# Patient Record
Sex: MALE | Race: WHITE | NOT HISPANIC OR LATINO | URBAN - METROPOLITAN AREA
[De-identification: names, ages, dates, MRNs, and addresses within clinical notes are randomized per-mention and may not be internally consistent; named-entity substitution may affect disease eponyms.]

---

## 2017-09-23 ENCOUNTER — INPATIENT (INPATIENT)
Facility: HOSPITAL | Age: 62
LOS: 7 days | Discharge: HOME | End: 2017-10-01
Attending: INTERNAL MEDICINE | Admitting: INTERNAL MEDICINE
Payer: COMMERCIAL

## 2017-09-23 VITALS
DIASTOLIC BLOOD PRESSURE: 88 MMHG | OXYGEN SATURATION: 100 % | TEMPERATURE: 98 F | SYSTOLIC BLOOD PRESSURE: 156 MMHG | HEART RATE: 72 BPM | RESPIRATION RATE: 16 BRPM

## 2017-09-23 DIAGNOSIS — R07.9 CHEST PAIN, UNSPECIFIED: ICD-10-CM

## 2017-09-23 LAB
ALBUMIN SERPL ELPH-MCNC: 4.1 G/DL — SIGNIFICANT CHANGE UP (ref 3.3–5)
ALP SERPL-CCNC: 82 U/L — SIGNIFICANT CHANGE UP (ref 40–120)
ALT FLD-CCNC: 22 U/L — SIGNIFICANT CHANGE UP (ref 4–41)
APTT BLD: 33 SEC — SIGNIFICANT CHANGE UP (ref 27.5–37.4)
AST SERPL-CCNC: 20 U/L — SIGNIFICANT CHANGE UP (ref 4–40)
BASE EXCESS BLDV CALC-SCNC: 2 MMOL/L — SIGNIFICANT CHANGE UP
BASOPHILS # BLD AUTO: 0.07 K/UL — SIGNIFICANT CHANGE UP (ref 0–0.2)
BASOPHILS NFR BLD AUTO: 0.8 % — SIGNIFICANT CHANGE UP (ref 0–2)
BILIRUB SERPL-MCNC: 0.4 MG/DL — SIGNIFICANT CHANGE UP (ref 0.2–1.2)
BLOOD GAS VENOUS - CREATININE: 0.9 MG/DL — SIGNIFICANT CHANGE UP (ref 0.5–1.3)
BUN SERPL-MCNC: 16 MG/DL — SIGNIFICANT CHANGE UP (ref 7–23)
CALCIUM SERPL-MCNC: 9.1 MG/DL — SIGNIFICANT CHANGE UP (ref 8.4–10.5)
CHLORIDE BLDV-SCNC: 109 MMOL/L — HIGH (ref 96–108)
CHLORIDE SERPL-SCNC: 107 MMOL/L — SIGNIFICANT CHANGE UP (ref 98–107)
CK MB BLD-MCNC: 1.82 NG/ML — SIGNIFICANT CHANGE UP (ref 1–6.6)
CK MB BLD-MCNC: SIGNIFICANT CHANGE UP (ref 0–2.5)
CK SERPL-CCNC: 39 U/L — SIGNIFICANT CHANGE UP (ref 30–200)
CK SERPL-CCNC: 43 U/L — SIGNIFICANT CHANGE UP (ref 30–200)
CO2 SERPL-SCNC: 25 MMOL/L — SIGNIFICANT CHANGE UP (ref 22–31)
CREAT SERPL-MCNC: 1 MG/DL — SIGNIFICANT CHANGE UP (ref 0.5–1.3)
EOSINOPHIL # BLD AUTO: 0.26 K/UL — SIGNIFICANT CHANGE UP (ref 0–0.5)
EOSINOPHIL NFR BLD AUTO: 2.9 % — SIGNIFICANT CHANGE UP (ref 0–6)
GAS PNL BLDV: 143 MMOL/L — SIGNIFICANT CHANGE UP (ref 136–146)
GLUCOSE BLDV-MCNC: 117 — HIGH (ref 70–99)
GLUCOSE SERPL-MCNC: 119 MG/DL — HIGH (ref 70–99)
HCO3 BLDV-SCNC: 25 MMOL/L — SIGNIFICANT CHANGE UP (ref 20–27)
HCT VFR BLD CALC: 37.7 % — LOW (ref 39–50)
HCT VFR BLDV CALC: 40.5 % — SIGNIFICANT CHANGE UP (ref 39–51)
HGB BLD-MCNC: 12.9 G/DL — LOW (ref 13–17)
HGB BLDV-MCNC: 13.2 G/DL — SIGNIFICANT CHANGE UP (ref 13–17)
IMM GRANULOCYTES # BLD AUTO: 0.02 # — SIGNIFICANT CHANGE UP
IMM GRANULOCYTES NFR BLD AUTO: 0.2 % — SIGNIFICANT CHANGE UP (ref 0–1.5)
INR BLD: 1.07 — SIGNIFICANT CHANGE UP (ref 0.88–1.17)
LACTATE BLDV-MCNC: 1 MMOL/L — SIGNIFICANT CHANGE UP (ref 0.5–2)
LYMPHOCYTES # BLD AUTO: 1.22 K/UL — SIGNIFICANT CHANGE UP (ref 1–3.3)
LYMPHOCYTES # BLD AUTO: 13.4 % — SIGNIFICANT CHANGE UP (ref 13–44)
MCHC RBC-ENTMCNC: 30.7 PG — SIGNIFICANT CHANGE UP (ref 27–34)
MCHC RBC-ENTMCNC: 34.2 % — SIGNIFICANT CHANGE UP (ref 32–36)
MCV RBC AUTO: 89.8 FL — SIGNIFICANT CHANGE UP (ref 80–100)
MONOCYTES # BLD AUTO: 0.41 K/UL — SIGNIFICANT CHANGE UP (ref 0–0.9)
MONOCYTES NFR BLD AUTO: 4.5 % — SIGNIFICANT CHANGE UP (ref 2–14)
NEUTROPHILS # BLD AUTO: 7.14 K/UL — SIGNIFICANT CHANGE UP (ref 1.8–7.4)
NEUTROPHILS NFR BLD AUTO: 78.2 % — HIGH (ref 43–77)
NRBC # FLD: 0 — SIGNIFICANT CHANGE UP
PCO2 BLDV: 53 MMHG — HIGH (ref 41–51)
PH BLDV: 7.33 PH — SIGNIFICANT CHANGE UP (ref 7.32–7.43)
PLATELET # BLD AUTO: 203 K/UL — SIGNIFICANT CHANGE UP (ref 150–400)
PMV BLD: 11.1 FL — SIGNIFICANT CHANGE UP (ref 7–13)
PO2 BLDV: 43 MMHG — HIGH (ref 35–40)
POTASSIUM BLDV-SCNC: 3.8 MMOL/L — SIGNIFICANT CHANGE UP (ref 3.4–4.5)
POTASSIUM SERPL-MCNC: 4 MMOL/L — SIGNIFICANT CHANGE UP (ref 3.5–5.3)
POTASSIUM SERPL-SCNC: 4 MMOL/L — SIGNIFICANT CHANGE UP (ref 3.5–5.3)
PROT SERPL-MCNC: 7.2 G/DL — SIGNIFICANT CHANGE UP (ref 6–8.3)
PROTHROM AB SERPL-ACNC: 12 SEC — SIGNIFICANT CHANGE UP (ref 9.8–13.1)
RBC # BLD: 4.2 M/UL — SIGNIFICANT CHANGE UP (ref 4.2–5.8)
RBC # FLD: 12.5 % — SIGNIFICANT CHANGE UP (ref 10.3–14.5)
SAO2 % BLDV: 75.7 % — SIGNIFICANT CHANGE UP (ref 60–85)
SODIUM SERPL-SCNC: 146 MMOL/L — HIGH (ref 135–145)
TROPONIN T SERPL-MCNC: < 0.06 NG/ML — SIGNIFICANT CHANGE UP (ref 0–0.06)
TROPONIN T SERPL-MCNC: < 0.06 NG/ML — SIGNIFICANT CHANGE UP (ref 0–0.06)
WBC # BLD: 9.12 K/UL — SIGNIFICANT CHANGE UP (ref 3.8–10.5)
WBC # FLD AUTO: 9.12 K/UL — SIGNIFICANT CHANGE UP (ref 3.8–10.5)

## 2017-09-23 PROCEDURE — 71020: CPT | Mod: 26

## 2017-09-23 PROCEDURE — 93010 ELECTROCARDIOGRAM REPORT: CPT

## 2017-09-23 RX ORDER — ASPIRIN/CALCIUM CARB/MAGNESIUM 324 MG
243 TABLET ORAL DAILY
Qty: 0 | Refills: 0 | Status: DISCONTINUED | OUTPATIENT
Start: 2017-09-23 | End: 2017-09-23

## 2017-09-23 RX ORDER — LOSARTAN POTASSIUM 100 MG/1
100 TABLET, FILM COATED ORAL DAILY
Qty: 0 | Refills: 0 | Status: DISCONTINUED | OUTPATIENT
Start: 2017-09-24 | End: 2017-10-01

## 2017-09-23 RX ORDER — FUROSEMIDE 40 MG
40 TABLET ORAL DAILY
Qty: 0 | Refills: 0 | Status: DISCONTINUED | OUTPATIENT
Start: 2017-09-23 | End: 2017-10-01

## 2017-09-23 RX ORDER — AMIODARONE HYDROCHLORIDE 400 MG/1
200 TABLET ORAL DAILY
Qty: 0 | Refills: 0 | Status: DISCONTINUED | OUTPATIENT
Start: 2017-09-24 | End: 2017-09-28

## 2017-09-23 RX ORDER — GLUCAGON INJECTION, SOLUTION 0.5 MG/.1ML
1 INJECTION, SOLUTION SUBCUTANEOUS ONCE
Qty: 0 | Refills: 0 | Status: DISCONTINUED | OUTPATIENT
Start: 2017-09-23 | End: 2017-10-01

## 2017-09-23 RX ORDER — RANOLAZINE 500 MG/1
500 TABLET, FILM COATED, EXTENDED RELEASE ORAL
Qty: 0 | Refills: 0 | Status: DISCONTINUED | OUTPATIENT
Start: 2017-09-23 | End: 2017-10-01

## 2017-09-23 RX ORDER — SODIUM CHLORIDE 9 MG/ML
1000 INJECTION, SOLUTION INTRAVENOUS
Qty: 0 | Refills: 0 | Status: DISCONTINUED | OUTPATIENT
Start: 2017-09-23 | End: 2017-10-01

## 2017-09-23 RX ORDER — HEPARIN SODIUM 5000 [USP'U]/ML
5000 INJECTION INTRAVENOUS; SUBCUTANEOUS EVERY 12 HOURS
Qty: 0 | Refills: 0 | Status: DISCONTINUED | OUTPATIENT
Start: 2017-09-23 | End: 2017-09-28

## 2017-09-23 RX ORDER — DEXTROSE 50 % IN WATER 50 %
1 SYRINGE (ML) INTRAVENOUS ONCE
Qty: 0 | Refills: 0 | Status: DISCONTINUED | OUTPATIENT
Start: 2017-09-23 | End: 2017-10-01

## 2017-09-23 RX ORDER — INFLUENZA VIRUS VACCINE 15; 15; 15; 15 UG/.5ML; UG/.5ML; UG/.5ML; UG/.5ML
0.5 SUSPENSION INTRAMUSCULAR ONCE
Qty: 0 | Refills: 0 | Status: COMPLETED | OUTPATIENT
Start: 2017-09-23 | End: 2017-10-01

## 2017-09-23 RX ORDER — ATORVASTATIN CALCIUM 80 MG/1
40 TABLET, FILM COATED ORAL AT BEDTIME
Qty: 0 | Refills: 0 | Status: DISCONTINUED | OUTPATIENT
Start: 2017-09-23 | End: 2017-10-01

## 2017-09-23 RX ORDER — INSULIN LISPRO 100/ML
VIAL (ML) SUBCUTANEOUS
Qty: 0 | Refills: 0 | Status: DISCONTINUED | OUTPATIENT
Start: 2017-09-23 | End: 2017-10-01

## 2017-09-23 RX ORDER — DEXTROSE 50 % IN WATER 50 %
25 SYRINGE (ML) INTRAVENOUS ONCE
Qty: 0 | Refills: 0 | Status: DISCONTINUED | OUTPATIENT
Start: 2017-09-23 | End: 2017-10-01

## 2017-09-23 RX ORDER — ACETAMINOPHEN 500 MG
650 TABLET ORAL ONCE
Qty: 0 | Refills: 0 | Status: COMPLETED | OUTPATIENT
Start: 2017-09-23 | End: 2017-09-23

## 2017-09-23 RX ORDER — ASPIRIN/CALCIUM CARB/MAGNESIUM 324 MG
81 TABLET ORAL DAILY
Qty: 0 | Refills: 0 | Status: DISCONTINUED | OUTPATIENT
Start: 2017-09-24 | End: 2017-10-01

## 2017-09-23 RX ORDER — ISOSORBIDE DINITRATE 5 MG/1
30 TABLET ORAL
Qty: 0 | Refills: 0 | Status: DISCONTINUED | OUTPATIENT
Start: 2017-09-23 | End: 2017-10-01

## 2017-09-23 RX ORDER — SODIUM CHLORIDE 9 MG/ML
3 INJECTION INTRAMUSCULAR; INTRAVENOUS; SUBCUTANEOUS EVERY 8 HOURS
Qty: 0 | Refills: 0 | Status: DISCONTINUED | OUTPATIENT
Start: 2017-09-23 | End: 2017-10-01

## 2017-09-23 RX ORDER — DEXTROSE 50 % IN WATER 50 %
12.5 SYRINGE (ML) INTRAVENOUS ONCE
Qty: 0 | Refills: 0 | Status: DISCONTINUED | OUTPATIENT
Start: 2017-09-23 | End: 2017-10-01

## 2017-09-23 RX ORDER — INSULIN LISPRO 100/ML
VIAL (ML) SUBCUTANEOUS AT BEDTIME
Qty: 0 | Refills: 0 | Status: DISCONTINUED | OUTPATIENT
Start: 2017-09-23 | End: 2017-10-01

## 2017-09-23 RX ADMIN — Medication 243 MILLIGRAM(S): at 18:12

## 2017-09-23 NOTE — ED PROVIDER NOTE - ATTENDING CONTRIBUTION TO CARE
Attending Statement: I have personally seen and examined this patient. I have fully participated in the care of this patient. I have reviewed all pertinent clinical information, including history physical exam, plan and the Resident's note and agree except as noted  see HPI/pe

## 2017-09-23 NOTE — ED ADULT TRIAGE NOTE - CHIEF COMPLAINT QUOTE
reports weakness,pain to both wrists  since yesterday. ch pains,diff breathing since today intermittent. headache continuous since today.  denies fever,cough..

## 2017-09-23 NOTE — ED PROVIDER NOTE - OBJECTIVE STATEMENT
Attending  61yo M  with daughter at bedside translating for pt. Pt consented. PT co chest pain since 5am. Described as a "tightness" midsternal non radiating wax/wane intensity. Associated w sob. Not pleuritic or positional. No associated nausea/palpitations.  Feels like prior MI. hx of CAD, HTN. Pt states a day ago he felt weak, had 'pain of both wrists" today had generalized weakness and chest pain. A week ago pt had chest pain saw his cardiologist had an echo, unknown results and scheduled for a stress test next week. 63 yo male presenting with 1 day hx of weakness and bilateral wrist pain and today patient started having exertional chest pressure associated with shortness of breath.  took his home medications for his symptoms.  not better or worse with anything.  5/10 in severity with radiation into the epigastric region.  denies fevers chills.  had echo last week but patient does not know results.  has stress scheduled next week.    mani Vyas  pcp- lee       Attending  61yo M  with daughter at bedside translating for pt. Pt consented. PT co chest pain since 5am. Described as a "tightness" midsternal non radiating wax/wane intensity. Associated w sob. Not pleuritic or positional. No associated nausea/palpitations.  Feels like prior MI. hx of CAD, HTN. Pt states a day ago he felt weak, had 'pain of both wrists" today had generalized weakness and chest pain. A week ago pt had chest pain saw his cardiologist had an echo, unknown results and scheduled for a stress test next week.

## 2017-09-23 NOTE — ED PROVIDER NOTE - PHYSICAL EXAMINATION
Attending  pt nontoxic appearing, +systolic murmur regular, No resp distress. able to speak in full and clear sentences. no wheeze, rales or stridor. no pedal edema. no calf tenderness. normal pulses bilateral feet. Soft nt abdomen, AOx3

## 2017-09-23 NOTE — ED ADULT NURSE NOTE - OBJECTIVE STATEMENT
62 year old male, A&Ox3, ambulatory, Spanish speaking - daughter at the bedside for translation; c/o chest pain & SOB that began this morning after waking up. Admits to generalized weakness, lethargy and chills since yesterday and an episode of bilateral wrist pain/swelling yesterday (denies trauma); Per daughter pt. has hx of CHF and "heart attack" while he was living in [facility] - moved to the  4 months ago. Respirations even, unlabored, but increased. Sinus rhythm with occasional missed beats on CM. No LE edema. Denies cough, fever, N/V/D, dysuria, headache, dizziness or syncope. IV placed, labs sent. MD at bedside for evaluation. 62 year old male, A&Ox3, ambulatory, Hungarian speaking - daughter at the bedside for translation; c/o chest pain & SOB that began this morning after waking up. Admits to generalized weakness, lethargy and chills since yesterday and an episode of bilateral wrist pain/swelling yesterday (denies trauma); Per daughter pt. has hx of CHF and "heart attack" while he was living in [facility] - moved to the  4 months ago. Respirations even, unlabored, but increased. Sinus rhythm with occasional missed beats on CM. No LE edema. Denies cough, fever, N/V/D, dysuria, dizziness or syncope. IV placed, labs sent. MD at bedside for evaluation.

## 2017-09-24 DIAGNOSIS — Z29.9 ENCOUNTER FOR PROPHYLACTIC MEASURES, UNSPECIFIED: ICD-10-CM

## 2017-09-24 DIAGNOSIS — R07.9 CHEST PAIN, UNSPECIFIED: ICD-10-CM

## 2017-09-24 DIAGNOSIS — E11.9 TYPE 2 DIABETES MELLITUS WITHOUT COMPLICATIONS: ICD-10-CM

## 2017-09-24 DIAGNOSIS — E78.5 HYPERLIPIDEMIA, UNSPECIFIED: ICD-10-CM

## 2017-09-24 DIAGNOSIS — I10 ESSENTIAL (PRIMARY) HYPERTENSION: ICD-10-CM

## 2017-09-24 LAB
BASOPHILS # BLD AUTO: 0.06 K/UL — SIGNIFICANT CHANGE UP (ref 0–0.2)
BASOPHILS NFR BLD AUTO: 0.8 % — SIGNIFICANT CHANGE UP (ref 0–2)
BUN SERPL-MCNC: 17 MG/DL — SIGNIFICANT CHANGE UP (ref 7–23)
CALCIUM SERPL-MCNC: 8.6 MG/DL — SIGNIFICANT CHANGE UP (ref 8.4–10.5)
CHLORIDE SERPL-SCNC: 104 MMOL/L — SIGNIFICANT CHANGE UP (ref 98–107)
CHOLEST SERPL-MCNC: 108 MG/DL — LOW (ref 120–199)
CK MB BLD-MCNC: 1.48 NG/ML — SIGNIFICANT CHANGE UP (ref 1–6.6)
CK MB BLD-MCNC: SIGNIFICANT CHANGE UP (ref 0–2.5)
CO2 SERPL-SCNC: 25 MMOL/L — SIGNIFICANT CHANGE UP (ref 22–31)
CREAT SERPL-MCNC: 0.94 MG/DL — SIGNIFICANT CHANGE UP (ref 0.5–1.3)
EOSINOPHIL # BLD AUTO: 0.18 K/UL — SIGNIFICANT CHANGE UP (ref 0–0.5)
EOSINOPHIL NFR BLD AUTO: 2.3 % — SIGNIFICANT CHANGE UP (ref 0–6)
GLUCOSE SERPL-MCNC: 119 MG/DL — HIGH (ref 70–99)
HBA1C BLD-MCNC: 7 % — HIGH (ref 4–5.6)
HCT VFR BLD CALC: 34.5 % — LOW (ref 39–50)
HDLC SERPL-MCNC: 37 MG/DL — SIGNIFICANT CHANGE UP (ref 35–55)
HGB BLD-MCNC: 11.6 G/DL — LOW (ref 13–17)
IMM GRANULOCYTES # BLD AUTO: 0.03 # — SIGNIFICANT CHANGE UP
IMM GRANULOCYTES NFR BLD AUTO: 0.4 % — SIGNIFICANT CHANGE UP (ref 0–1.5)
LIPID PNL WITH DIRECT LDL SERPL: 62 MG/DL — SIGNIFICANT CHANGE UP
LYMPHOCYTES # BLD AUTO: 0.89 K/UL — LOW (ref 1–3.3)
LYMPHOCYTES # BLD AUTO: 11.2 % — LOW (ref 13–44)
MAGNESIUM SERPL-MCNC: 1.4 MG/DL — LOW (ref 1.6–2.6)
MCHC RBC-ENTMCNC: 29.7 PG — SIGNIFICANT CHANGE UP (ref 27–34)
MCHC RBC-ENTMCNC: 33.6 % — SIGNIFICANT CHANGE UP (ref 32–36)
MCV RBC AUTO: 88.5 FL — SIGNIFICANT CHANGE UP (ref 80–100)
MONOCYTES # BLD AUTO: 0.45 K/UL — SIGNIFICANT CHANGE UP (ref 0–0.9)
MONOCYTES NFR BLD AUTO: 5.7 % — SIGNIFICANT CHANGE UP (ref 2–14)
NEUTROPHILS # BLD AUTO: 6.35 K/UL — SIGNIFICANT CHANGE UP (ref 1.8–7.4)
NEUTROPHILS NFR BLD AUTO: 79.6 % — HIGH (ref 43–77)
NRBC # FLD: 0 — SIGNIFICANT CHANGE UP
PHOSPHATE SERPL-MCNC: 3.7 MG/DL — SIGNIFICANT CHANGE UP (ref 2.5–4.5)
PLATELET # BLD AUTO: 170 K/UL — SIGNIFICANT CHANGE UP (ref 150–400)
PMV BLD: 11.4 FL — SIGNIFICANT CHANGE UP (ref 7–13)
POTASSIUM SERPL-MCNC: 3.7 MMOL/L — SIGNIFICANT CHANGE UP (ref 3.5–5.3)
POTASSIUM SERPL-SCNC: 3.7 MMOL/L — SIGNIFICANT CHANGE UP (ref 3.5–5.3)
RBC # BLD: 3.9 M/UL — LOW (ref 4.2–5.8)
RBC # FLD: 12.5 % — SIGNIFICANT CHANGE UP (ref 10.3–14.5)
SODIUM SERPL-SCNC: 144 MMOL/L — SIGNIFICANT CHANGE UP (ref 135–145)
TRIGL SERPL-MCNC: 85 MG/DL — SIGNIFICANT CHANGE UP (ref 10–149)
TSH SERPL-MCNC: 0.14 UIU/ML — LOW (ref 0.27–4.2)
WBC # BLD: 7.96 K/UL — SIGNIFICANT CHANGE UP (ref 3.8–10.5)
WBC # FLD AUTO: 7.96 K/UL — SIGNIFICANT CHANGE UP (ref 3.8–10.5)

## 2017-09-24 RX ORDER — ACETAMINOPHEN 500 MG
650 TABLET ORAL EVERY 6 HOURS
Qty: 0 | Refills: 0 | Status: DISCONTINUED | OUTPATIENT
Start: 2017-09-24 | End: 2017-10-01

## 2017-09-24 RX ORDER — MAGNESIUM SULFATE 500 MG/ML
1 VIAL (ML) INJECTION ONCE
Qty: 0 | Refills: 0 | Status: COMPLETED | OUTPATIENT
Start: 2017-09-24 | End: 2017-09-24

## 2017-09-24 RX ADMIN — SODIUM CHLORIDE 3 MILLILITER(S): 9 INJECTION INTRAMUSCULAR; INTRAVENOUS; SUBCUTANEOUS at 21:37

## 2017-09-24 RX ADMIN — ISOSORBIDE DINITRATE 30 MILLIGRAM(S): 5 TABLET ORAL at 06:14

## 2017-09-24 RX ADMIN — RANOLAZINE 500 MILLIGRAM(S): 500 TABLET, FILM COATED, EXTENDED RELEASE ORAL at 00:05

## 2017-09-24 RX ADMIN — RANOLAZINE 500 MILLIGRAM(S): 500 TABLET, FILM COATED, EXTENDED RELEASE ORAL at 17:33

## 2017-09-24 RX ADMIN — ISOSORBIDE DINITRATE 30 MILLIGRAM(S): 5 TABLET ORAL at 17:33

## 2017-09-24 RX ADMIN — ISOSORBIDE DINITRATE 30 MILLIGRAM(S): 5 TABLET ORAL at 00:05

## 2017-09-24 RX ADMIN — SODIUM CHLORIDE 3 MILLILITER(S): 9 INJECTION INTRAMUSCULAR; INTRAVENOUS; SUBCUTANEOUS at 06:16

## 2017-09-24 RX ADMIN — Medication 100 GRAM(S): at 23:59

## 2017-09-24 RX ADMIN — HEPARIN SODIUM 5000 UNIT(S): 5000 INJECTION INTRAVENOUS; SUBCUTANEOUS at 17:33

## 2017-09-24 RX ADMIN — Medication 650 MILLIGRAM(S): at 10:43

## 2017-09-24 RX ADMIN — Medication 650 MILLIGRAM(S): at 11:40

## 2017-09-24 RX ADMIN — AMIODARONE HYDROCHLORIDE 200 MILLIGRAM(S): 400 TABLET ORAL at 06:14

## 2017-09-24 RX ADMIN — Medication 2: at 14:03

## 2017-09-24 RX ADMIN — HEPARIN SODIUM 5000 UNIT(S): 5000 INJECTION INTRAVENOUS; SUBCUTANEOUS at 06:14

## 2017-09-24 RX ADMIN — RANOLAZINE 500 MILLIGRAM(S): 500 TABLET, FILM COATED, EXTENDED RELEASE ORAL at 06:14

## 2017-09-24 RX ADMIN — Medication 650 MILLIGRAM(S): at 00:05

## 2017-09-24 RX ADMIN — LOSARTAN POTASSIUM 100 MILLIGRAM(S): 100 TABLET, FILM COATED ORAL at 06:14

## 2017-09-24 RX ADMIN — ATORVASTATIN CALCIUM 40 MILLIGRAM(S): 80 TABLET, FILM COATED ORAL at 21:38

## 2017-09-24 RX ADMIN — SODIUM CHLORIDE 3 MILLILITER(S): 9 INJECTION INTRAMUSCULAR; INTRAVENOUS; SUBCUTANEOUS at 14:06

## 2017-09-24 RX ADMIN — Medication 650 MILLIGRAM(S): at 01:00

## 2017-09-24 RX ADMIN — Medication 40 MILLIGRAM(S): at 06:14

## 2017-09-24 RX ADMIN — Medication 81 MILLIGRAM(S): at 14:02

## 2017-09-24 RX ADMIN — Medication 1: at 18:22

## 2017-09-24 NOTE — H&P ADULT - ASSESSMENT
63 y/o M with hx of "heart problems" HTN, HLD, DM, presents with Midsternal chest pain which radiates to the epigastric region x 1 day.  admitted to r/o ACS

## 2017-09-24 NOTE — H&P ADULT - HISTORY OF PRESENT ILLNESS
63 y/o M with hx of "heart problems" HTN, HLD, DM, presents with Midsternal chest pain which radiates to the epigastric region x 1 day. Chest pain is intermittent and is associated with SOB. This Am patient also was very fatigued than usual Patient states he is able to walk a mile without getting out of breath. He moved from [facility] few months ago and few weeks ago he saw a PMD here who started him on American version of medications he was taking in [facility]. He was then instructed to follow up with a cardiologist. The cardiologist did an echo a week ago, but results are unavailable. Denies fever, chills, cough, falls, LOC, abdominal pain, nausea, vomiting, melena, hematochezia, LE edema, calf tenderness, d ysuria, diarrhea, constipation or melena.     As per daughter, patient has history of "heart attacks" but has no stents placed. Patient also mentioned he has "pericarditis." Daughter states they dont know exactly the name of the father medical issues because all medical documents are from [facility] and she does not know how to translate.   ****Additional history obtained from daughter as well.****    On admission: patient complains of mild chest pain, which is less than before and headache and overall body ache. BP: 180/94, HR: 77 O2: 94% on RA. EKG sinus without acute changes. Will place Oxygen and Will give Tylenlol 650x1. Will give home dose of isosorbide and ranexa for chest pain/elevated BP. cardiac enzymes sent.

## 2017-09-24 NOTE — H&P ADULT - PROBLEM SELECTOR PLAN 1
Admit to tele  check cbc, bmp, a1c, flp, tsh, trend CE  Echo ordered   consider ischemic eval  cont ranexa, isosorbide, aspirin,   f/u MD note Admit to tele  check cbc, bmp, a1c, flp, tsh, trend CE  Echo ordered   consider ischemic eval --> possible cath on Monday  cont ranexa, isosorbide, aspirin,   Discussed with Dr. Gonzalez  f/u MD note

## 2017-09-24 NOTE — CONSULT NOTE ADULT - ASSESSMENT
Patient is a 62y old  Male who presents with a chief complaint of Chest pain.    Chest pain:  Tele  Cardio eval appreciated.  ASA/Amio/Ranexa/Imdur  For C. Cath tomorrow.    HTN:  Losartan/Lasix/    Uncontrolled DM II:  FSSS/ /156/232.    HLD:  Lipitor

## 2017-09-25 LAB
BUN SERPL-MCNC: 21 MG/DL — SIGNIFICANT CHANGE UP (ref 7–23)
CALCIUM SERPL-MCNC: 8.7 MG/DL — SIGNIFICANT CHANGE UP (ref 8.4–10.5)
CHLORIDE SERPL-SCNC: 104 MMOL/L — SIGNIFICANT CHANGE UP (ref 98–107)
CO2 SERPL-SCNC: 27 MMOL/L — SIGNIFICANT CHANGE UP (ref 22–31)
CREAT SERPL-MCNC: 1.01 MG/DL — SIGNIFICANT CHANGE UP (ref 0.5–1.3)
GLUCOSE SERPL-MCNC: 159 MG/DL — HIGH (ref 70–99)
HCT VFR BLD CALC: 36.3 % — LOW (ref 39–50)
HGB BLD-MCNC: 12 G/DL — LOW (ref 13–17)
MAGNESIUM SERPL-MCNC: 1.9 MG/DL — SIGNIFICANT CHANGE UP (ref 1.6–2.6)
MCHC RBC-ENTMCNC: 29.6 PG — SIGNIFICANT CHANGE UP (ref 27–34)
MCHC RBC-ENTMCNC: 33.1 % — SIGNIFICANT CHANGE UP (ref 32–36)
MCV RBC AUTO: 89.4 FL — SIGNIFICANT CHANGE UP (ref 80–100)
NRBC # FLD: 0 — SIGNIFICANT CHANGE UP
PLATELET # BLD AUTO: 193 K/UL — SIGNIFICANT CHANGE UP (ref 150–400)
PMV BLD: 11.4 FL — SIGNIFICANT CHANGE UP (ref 7–13)
POTASSIUM SERPL-MCNC: 4.3 MMOL/L — SIGNIFICANT CHANGE UP (ref 3.5–5.3)
POTASSIUM SERPL-SCNC: 4.3 MMOL/L — SIGNIFICANT CHANGE UP (ref 3.5–5.3)
RBC # BLD: 4.06 M/UL — LOW (ref 4.2–5.8)
RBC # FLD: 12.6 % — SIGNIFICANT CHANGE UP (ref 10.3–14.5)
SODIUM SERPL-SCNC: 143 MMOL/L — SIGNIFICANT CHANGE UP (ref 135–145)
SPECIMEN SOURCE: SIGNIFICANT CHANGE UP
SPECIMEN SOURCE: SIGNIFICANT CHANGE UP
WBC # BLD: 7.9 K/UL — SIGNIFICANT CHANGE UP (ref 3.8–10.5)
WBC # FLD AUTO: 7.9 K/UL — SIGNIFICANT CHANGE UP (ref 3.8–10.5)

## 2017-09-25 PROCEDURE — 93458 L HRT ARTERY/VENTRICLE ANGIO: CPT | Mod: 26,GC

## 2017-09-25 PROCEDURE — 93010 ELECTROCARDIOGRAM REPORT: CPT

## 2017-09-25 PROCEDURE — 71250 CT THORAX DX C-: CPT | Mod: 26

## 2017-09-25 RX ORDER — PANTOPRAZOLE SODIUM 20 MG/1
40 TABLET, DELAYED RELEASE ORAL
Qty: 0 | Refills: 0 | Status: DISCONTINUED | OUTPATIENT
Start: 2017-09-25 | End: 2017-10-01

## 2017-09-25 RX ORDER — ONDANSETRON 8 MG/1
4 TABLET, FILM COATED ORAL ONCE
Qty: 0 | Refills: 0 | Status: COMPLETED | OUTPATIENT
Start: 2017-09-25 | End: 2017-09-25

## 2017-09-25 RX ADMIN — ONDANSETRON 4 MILLIGRAM(S): 8 TABLET, FILM COATED ORAL at 02:26

## 2017-09-25 RX ADMIN — Medication 650 MILLIGRAM(S): at 19:15

## 2017-09-25 RX ADMIN — ISOSORBIDE DINITRATE 30 MILLIGRAM(S): 5 TABLET ORAL at 17:04

## 2017-09-25 RX ADMIN — LOSARTAN POTASSIUM 100 MILLIGRAM(S): 100 TABLET, FILM COATED ORAL at 06:00

## 2017-09-25 RX ADMIN — HEPARIN SODIUM 5000 UNIT(S): 5000 INJECTION INTRAVENOUS; SUBCUTANEOUS at 17:04

## 2017-09-25 RX ADMIN — HEPARIN SODIUM 5000 UNIT(S): 5000 INJECTION INTRAVENOUS; SUBCUTANEOUS at 06:00

## 2017-09-25 RX ADMIN — AMIODARONE HYDROCHLORIDE 200 MILLIGRAM(S): 400 TABLET ORAL at 06:00

## 2017-09-25 RX ADMIN — Medication 1: at 18:45

## 2017-09-25 RX ADMIN — ATORVASTATIN CALCIUM 40 MILLIGRAM(S): 80 TABLET, FILM COATED ORAL at 21:42

## 2017-09-25 RX ADMIN — Medication 650 MILLIGRAM(S): at 18:45

## 2017-09-25 RX ADMIN — RANOLAZINE 500 MILLIGRAM(S): 500 TABLET, FILM COATED, EXTENDED RELEASE ORAL at 17:05

## 2017-09-25 RX ADMIN — SODIUM CHLORIDE 3 MILLILITER(S): 9 INJECTION INTRAMUSCULAR; INTRAVENOUS; SUBCUTANEOUS at 21:41

## 2017-09-25 RX ADMIN — SODIUM CHLORIDE 3 MILLILITER(S): 9 INJECTION INTRAMUSCULAR; INTRAVENOUS; SUBCUTANEOUS at 06:01

## 2017-09-25 RX ADMIN — Medication 81 MILLIGRAM(S): at 10:08

## 2017-09-25 RX ADMIN — PANTOPRAZOLE SODIUM 40 MILLIGRAM(S): 20 TABLET, DELAYED RELEASE ORAL at 06:28

## 2017-09-25 RX ADMIN — ISOSORBIDE DINITRATE 30 MILLIGRAM(S): 5 TABLET ORAL at 06:00

## 2017-09-25 RX ADMIN — Medication 40 MILLIGRAM(S): at 06:00

## 2017-09-25 RX ADMIN — RANOLAZINE 500 MILLIGRAM(S): 500 TABLET, FILM COATED, EXTENDED RELEASE ORAL at 06:00

## 2017-09-25 NOTE — PROGRESS NOTE ADULT - SUBJECTIVE AND OBJECTIVE BOX
Patient is a 62y old  Male who presents with a chief complaint of Chest pain (24 Sep 2017 00:08)      SUBJECTIVE / OVERNIGHT EVENTS:   Feels better.  Denies CP/SOB/Palpitation/HA.    MEDICATIONS  (STANDING):  influenza   Vaccine 0.5 milliLiter(s) IntraMuscular once  sodium chloride 0.9% lock flush 3 milliLiter(s) IV Push every 8 hours  aspirin enteric coated 81 milliGRAM(s) Oral daily  losartan 100 milliGRAM(s) Oral daily  isosorbide   dinitrate Tablet (ISORDIL) 30 milliGRAM(s) Oral two times a day  ranolazine 500 milliGRAM(s) Oral two times a day  amiodarone    Tablet 200 milliGRAM(s) Oral daily  atorvastatin 40 milliGRAM(s) Oral at bedtime  furosemide    Tablet 40 milliGRAM(s) Oral daily  insulin lispro (HumaLOG) corrective regimen sliding scale   SubCutaneous three times a day before meals  insulin lispro (HumaLOG) corrective regimen sliding scale   SubCutaneous at bedtime  dextrose 5%. 1000 milliLiter(s) (50 mL/Hr) IV Continuous <Continuous>  dextrose 50% Injectable 12.5 Gram(s) IV Push once  dextrose 50% Injectable 25 Gram(s) IV Push once  dextrose 50% Injectable 25 Gram(s) IV Push once  heparin  Injectable 5000 Unit(s) SubCutaneous every 12 hours  pantoprazole    Tablet 40 milliGRAM(s) Oral before breakfast    MEDICATIONS  (PRN):  dextrose Gel 1 Dose(s) Oral once PRN Blood Glucose LESS THAN 70 milliGRAM(s)/deciliter  glucagon  Injectable 1 milliGRAM(s) IntraMuscular once PRN Glucose LESS THAN 70 milligrams/deciliter  acetaminophen   Tablet. 650 milliGRAM(s) Oral every 6 hours PRN Severe Pain (7 - 10)        CAPILLARY BLOOD GLUCOSE  208 (25 Sep 2017 22:25)  199 (25 Sep 2017 17:30)  143 (25 Sep 2017 09:18)        I&O's Summary      PHYSICAL EXAM:  GENERAL: NAD, well-developed  HEAD:  Atraumatic, Normocephalic  EYES: conjunctiva and sclera clear  NECK: Supple, No JVD  CHEST/LUNG: Clear to auscultation bilaterally; No wheeze  HEART: Regular rate and rhythm; No murmurs, rubs, or gallops  ABDOMEN: Soft, Nontender, Nondistended; Bowel sounds present  EXTREMITIES:  2+ Peripheral Pulses, No clubbing, cyanosis, or edema  NEUROLOGY: AAO X 3  SKIN: No rashes    LABS:                        12.0   7.90  )-----------( 193      ( 25 Sep 2017 05:45 )             36.3     09-25    143  |  104  |  21  ----------------------------<  159<H>  4.3   |  27  |  1.01    Ca    8.7      25 Sep 2017 05:45  Phos  3.7     09-24  Mg     1.9     09-25              CAPILLARY BLOOD GLUCOSE  208 (25 Sep 2017 22:25)  199 (25 Sep 2017 17:30)  143 (25 Sep 2017 09:18)                    RADIOLOGY & ADDITIONAL TESTS:    Imaging Personally Reviewed:    Consultant(s) Notes Reviewed:      Care Discussed with Consultants/Other Providers:

## 2017-09-25 NOTE — CHART NOTE - NSCHARTNOTEFT_GEN_A_CORE
pt s/p cath via right wrist, site c/d/i. no hematoma noted. pulse 2+. no weakness of upper extremities, will continue to monitor

## 2017-09-26 LAB
BUN SERPL-MCNC: 21 MG/DL — SIGNIFICANT CHANGE UP (ref 7–23)
CALCIUM SERPL-MCNC: 8.8 MG/DL — SIGNIFICANT CHANGE UP (ref 8.4–10.5)
CHLORIDE SERPL-SCNC: 102 MMOL/L — SIGNIFICANT CHANGE UP (ref 98–107)
CO2 SERPL-SCNC: 26 MMOL/L — SIGNIFICANT CHANGE UP (ref 22–31)
CREAT SERPL-MCNC: 1.06 MG/DL — SIGNIFICANT CHANGE UP (ref 0.5–1.3)
GLUCOSE SERPL-MCNC: 164 MG/DL — HIGH (ref 70–99)
HCT VFR BLD CALC: 35.8 % — LOW (ref 39–50)
HGB BLD-MCNC: 11.9 G/DL — LOW (ref 13–17)
MAGNESIUM SERPL-MCNC: 2.9 MG/DL — HIGH (ref 1.6–2.6)
MCHC RBC-ENTMCNC: 29.4 PG — SIGNIFICANT CHANGE UP (ref 27–34)
MCHC RBC-ENTMCNC: 33.2 % — SIGNIFICANT CHANGE UP (ref 32–36)
MCV RBC AUTO: 88.4 FL — SIGNIFICANT CHANGE UP (ref 80–100)
NRBC # FLD: 0 — SIGNIFICANT CHANGE UP
PLATELET # BLD AUTO: 204 K/UL — SIGNIFICANT CHANGE UP (ref 150–400)
PMV BLD: 11.1 FL — SIGNIFICANT CHANGE UP (ref 7–13)
POTASSIUM SERPL-MCNC: 3.8 MMOL/L — SIGNIFICANT CHANGE UP (ref 3.5–5.3)
POTASSIUM SERPL-SCNC: 3.8 MMOL/L — SIGNIFICANT CHANGE UP (ref 3.5–5.3)
RBC # BLD: 4.05 M/UL — LOW (ref 4.2–5.8)
RBC # FLD: 12.7 % — SIGNIFICANT CHANGE UP (ref 10.3–14.5)
SODIUM SERPL-SCNC: 142 MMOL/L — SIGNIFICANT CHANGE UP (ref 135–145)
TROPONIN T SERPL-MCNC: < 0.06 NG/ML — SIGNIFICANT CHANGE UP (ref 0–0.06)
WBC # BLD: 6.97 K/UL — SIGNIFICANT CHANGE UP (ref 3.8–10.5)
WBC # FLD AUTO: 6.97 K/UL — SIGNIFICANT CHANGE UP (ref 3.8–10.5)

## 2017-09-26 PROCEDURE — 93010 ELECTROCARDIOGRAM REPORT: CPT | Mod: 76

## 2017-09-26 RX ORDER — IPRATROPIUM/ALBUTEROL SULFATE 18-103MCG
3 AEROSOL WITH ADAPTER (GRAM) INHALATION ONCE
Qty: 0 | Refills: 0 | Status: COMPLETED | OUTPATIENT
Start: 2017-09-26 | End: 2017-09-26

## 2017-09-26 RX ADMIN — ATORVASTATIN CALCIUM 40 MILLIGRAM(S): 80 TABLET, FILM COATED ORAL at 22:01

## 2017-09-26 RX ADMIN — HEPARIN SODIUM 5000 UNIT(S): 5000 INJECTION INTRAVENOUS; SUBCUTANEOUS at 17:40

## 2017-09-26 RX ADMIN — AMIODARONE HYDROCHLORIDE 200 MILLIGRAM(S): 400 TABLET ORAL at 11:24

## 2017-09-26 RX ADMIN — LOSARTAN POTASSIUM 100 MILLIGRAM(S): 100 TABLET, FILM COATED ORAL at 05:27

## 2017-09-26 RX ADMIN — Medication 3: at 13:15

## 2017-09-26 RX ADMIN — ISOSORBIDE DINITRATE 30 MILLIGRAM(S): 5 TABLET ORAL at 05:27

## 2017-09-26 RX ADMIN — RANOLAZINE 500 MILLIGRAM(S): 500 TABLET, FILM COATED, EXTENDED RELEASE ORAL at 05:27

## 2017-09-26 RX ADMIN — PANTOPRAZOLE SODIUM 40 MILLIGRAM(S): 20 TABLET, DELAYED RELEASE ORAL at 05:27

## 2017-09-26 RX ADMIN — Medication 40 MILLIGRAM(S): at 05:30

## 2017-09-26 RX ADMIN — Medication 3 MILLILITER(S): at 18:20

## 2017-09-26 RX ADMIN — Medication: at 09:31

## 2017-09-26 RX ADMIN — SODIUM CHLORIDE 3 MILLILITER(S): 9 INJECTION INTRAMUSCULAR; INTRAVENOUS; SUBCUTANEOUS at 21:55

## 2017-09-26 RX ADMIN — Medication 81 MILLIGRAM(S): at 11:25

## 2017-09-26 RX ADMIN — SODIUM CHLORIDE 3 MILLILITER(S): 9 INJECTION INTRAMUSCULAR; INTRAVENOUS; SUBCUTANEOUS at 05:26

## 2017-09-26 RX ADMIN — ISOSORBIDE DINITRATE 30 MILLIGRAM(S): 5 TABLET ORAL at 17:40

## 2017-09-26 RX ADMIN — RANOLAZINE 500 MILLIGRAM(S): 500 TABLET, FILM COATED, EXTENDED RELEASE ORAL at 17:40

## 2017-09-26 RX ADMIN — SODIUM CHLORIDE 3 MILLILITER(S): 9 INJECTION INTRAMUSCULAR; INTRAVENOUS; SUBCUTANEOUS at 13:36

## 2017-09-26 RX ADMIN — HEPARIN SODIUM 5000 UNIT(S): 5000 INJECTION INTRAVENOUS; SUBCUTANEOUS at 05:27

## 2017-09-26 RX ADMIN — Medication 2: at 18:04

## 2017-09-26 NOTE — PROGRESS NOTE ADULT - SUBJECTIVE AND OBJECTIVE BOX
PRESENTING CC: Chest pain-Dyspnea    SUBJ: 61 y/o M with hx of "heart problems" HTN, HLD, DM, presents with Midsternal chest pain which radiates to the epigastric region x 1 day,had cath- RCA with inferior calcified aneurysm,Moderately severe Mitral Regurgitation noted          MEDICATIONS  (STANDING):  influenza   Vaccine 0.5 milliLiter(s) IntraMuscular once  sodium chloride 0.9% lock flush 3 milliLiter(s) IV Push every 8 hours  aspirin enteric coated 81 milliGRAM(s) Oral daily  losartan 100 milliGRAM(s) Oral daily  isosorbide   dinitrate Tablet (ISORDIL) 30 milliGRAM(s) Oral two times a day  ranolazine 500 milliGRAM(s) Oral two times a day  amiodarone    Tablet 200 milliGRAM(s) Oral daily  atorvastatin 40 milliGRAM(s) Oral at bedtime  furosemide    Tablet 40 milliGRAM(s) Oral daily  insulin lispro (HumaLOG) corrective regimen sliding scale   SubCutaneous three times a day before meals  insulin lispro (HumaLOG) corrective regimen sliding scale   SubCutaneous at bedtime  heparin  Injectable 5000 Unit(s) SubCutaneous every 12 hours  pantoprazole    Tablet 40 milliGRAM(s) Oral before breakfast    MEDICATIONS  (PRN):  dextrose Gel 1 Dose(s) Oral once PRN Blood Glucose LESS THAN 70 milliGRAM(s)/deciliter  glucagon  Injectable 1 milliGRAM(s) IntraMuscular once PRN Glucose LESS THAN 70 milligrams/deciliter  acetaminophen   Tablet. 650 milliGRAM(s) Oral every 6 hours PRN Severe Pain (7 - 10)          FAMILY HISTORY:  No pertinent family history in first degree relatives  No family history of premature coronary artery disease or sudden cardiac death      REVIEW OF SYSTEMS:  Constitutional: [ ] fever, [ ]weight loss,  [x ]fatigue  Eyes: [ ] visual changes  Respiratory: [x]shortness of breath;  [ ] cough, [ ]wheezing, [ ]chills, [ ]hemoptysis  Cardiovascular: [x] chest pain, [ ]palpitations, [ ]dizziness,  [ ]leg swelling  Gastrointestinal: [ ] abdominal pain, [ ]nausea, [ ]vomiting,  [ ]diarrhea   Genitourinary: [ ] dysuria, [ ] hematuria  Neurologic: [ ] headaches [ ] tremors  Skin: [ ] itching, [ ]burning, [ ] rashes  Endocrine: [ ] heat or cold intolerance  Musculoskeletal: [ ] joint pain or swelling; [ ] muscle, back, or extremity pain  Psychiatric: [ ] depression, [ ]anxiety, [ ]mood swings, or [ ]difficulty sleeping  Hematologic: [ ] easy bruising, [ ] bleeding gums    [x] All remaining systems negative except as per above.     Vital Signs Last 24 Hrs  T(C): 36.6 (26 Sep 2017 05:25), Max: 36.7 (25 Sep 2017 17:01)  T(F): 97.8 (26 Sep 2017 05:25), Max: 98 (25 Sep 2017 17:01)  HR: 55 (26 Sep 2017 05:25) (55 - 71)  BP: 126/72 (26 Sep 2017 05:25) (119/64 - 149/78)  RR: 16 (26 Sep 2017 05:25) (16 - 19)  SpO2: 100% (26 Sep 2017 05:25) (96% - 100%)  I&O's Summary      PHYSICAL EXAM:  General: No acute distress  HEENT: EOMI, PERRL  Neck: Supple, No JVD  Lungs: Clear to percussion bilaterally; No rales or wheezing  Heart: Regular rate and rhythm; 3/6 systolic murmur radiating to axilla,no rubs, or gallops  Abdomen: Nontender, bowel sounds present  Extremities: No clubbing, cyanosis, or edema  Nervous system:  Alert & Oriented X3, no focal deficits  Psychiatric: Normal affect  Skin: No rashes or lesions    LABS:  09-25    143  |  104  |  21  ----------------------------<  159<H>  4.3   |  27  |  1.01    Ca    8.7      25 Sep 2017 05:45  Mg     1.9     09-25      Creatinine Trend: 1.01<--, 0.94<--, 1.00<--                        12.0   7.90  )-----------( 193      ( 25 Sep 2017 05:45 )             36.3       RADIOLOGY: CT CHEST  IMPRESSION:   1.  Calcified left ventricular aneurysm measuring 10.0 x 7.7 cm. This   corresponds to finding noted on chest radiograph dated 9/23/2017.  2.  7 mm right upper lobe nodule. Follow-up CT chest in 6 months is   recommended.      ECG [my interpretation]: Sinus rhythm  at 76 bpm with 1st degree A-V block with Premature atrial complexes Inferior infarct ,    TELEMETRY: Sinus rhythm,noted episodes 2 degree HB    ECHO:PENDING      IMPRESSION AND PLAN:    61 y/o M with hx of "heart problems" HTN, HLD, DM, presents with Midsternal chest pain which radiates to the epigastric region x 1 day.  admitted to r/o ACS   Problem/Plan - 1:  ·  Problem: Chest pain.  Plan: Admit to tele. Cath -RCA with LV aneurysm and Mos severe MR,awaiting TTE to evaluate severity of Mitral regurgitation.    Problem/Plan - 3:  ·  Problem: Diabetes mellitus, type 2.  Plan: ISS  Monitor fingersticks  not on home meds.   check a1c.     Problem/Plan - 4:  ·  Problem: Hypertension.  Plan: cont isosorbide, losartan and lasix.     Problem/Plan - 5:  ·  Problem: Need for prophylactic measure.  Plan: hep sc.

## 2017-09-26 NOTE — CHART NOTE - NSCHARTNOTEFT_GEN_A_CORE
Assessment of Access Site Post Cardiac Catheterization:  No hemodynamic instability, extremity is neurovascularly intact, no evidence of hematoma/bruit

## 2017-09-26 NOTE — CHART NOTE - NSCHARTNOTEFT_GEN_A_CORE
Pt reported chest pressure per RN. Pt seen at bedside reported intermittent chest pressure at the center of chest going upward lasting for seconds. + dsypnea. Denied any diaphoresis or palpitation. VSS. S1S2 with + murmur, Right lung with mild wheezing. Rest of the exam at baseline. EKG with no ST change (Q wave inferiorly with 1st degree block). sinus. Will give nebs and send trop. low suspicion of ACS.

## 2017-09-26 NOTE — CONSULT NOTE ADULT - SUBJECTIVE AND OBJECTIVE BOX
EP ATTENDING      HISTORY OF PRESENT ILLNESS: He is a pleasant 63 y/o male PMH mod-severe MR associated with a likely non-ischemic cardiomyopathy who was admitted to the hospital with dyspnea. EP is now called for abnormal tele. Review of telemetry shows blocked APCs. There is no evidence of Mobitz II AV block. He denies palpitations nor syncope. An official TTE is pending.    PAST MEDICAL & SURGICAL HISTORY:  DM  Hyperlipidemia  Hypertension  CAD ( of RCA)  mod-severe MR (TTE pending)  NICM (LVEF pending)    No significant past surgical history    MEDICATIONS  (STANDING):  influenza   Vaccine 0.5 milliLiter(s) IntraMuscular once  sodium chloride 0.9% lock flush 3 milliLiter(s) IV Push every 8 hours  aspirin enteric coated 81 milliGRAM(s) Oral daily  losartan 100 milliGRAM(s) Oral daily  isosorbide   dinitrate Tablet (ISORDIL) 30 milliGRAM(s) Oral two times a day  ranolazine 500 milliGRAM(s) Oral two times a day  amiodarone    Tablet 200 milliGRAM(s) Oral daily  atorvastatin 40 milliGRAM(s) Oral at bedtime  furosemide    Tablet 40 milliGRAM(s) Oral daily  insulin lispro (HumaLOG) corrective regimen sliding scale   SubCutaneous three times a day before meals  insulin lispro (HumaLOG) corrective regimen sliding scale   SubCutaneous at bedtime  dextrose 5%. 1000 milliLiter(s) (50 mL/Hr) IV Continuous <Continuous>  dextrose 50% Injectable 12.5 Gram(s) IV Push once  dextrose 50% Injectable 25 Gram(s) IV Push once  dextrose 50% Injectable 25 Gram(s) IV Push once  heparin  Injectable 5000 Unit(s) SubCutaneous every 12 hours  pantoprazole    Tablet 40 milliGRAM(s) Oral before breakfast    No Known Allergies    FAMILY HISTORY:  No pertinent family history in first degree relatives  Non-contributary for premature coronary disease or sudden cardiac death    SOCIAL HISTORY:    [x ] Non-smoker  [ ] Smoker  [ ] Alcohol      REVIEW OF SYSTEMS:  [x ]chest pain  [ x ]shortness of breath  [  ]palpitations  [  ]syncope  [ ]near syncope [ ]upper extremity weakness   [ ] lower extremity weakness  [  ]diplopia  [  ]altered mental status   [  ]fevers  [ ]chills [ ]nausea  [ ]vomitting  [  ]dysphagia    [ ]abdominal pain  [ ]melena  [ ]BRBPR    [  ]epistaxis  [  ]rash    [ ]lower extremity edema        [x ] All others negative	  [ ] Unable to obtain    PHYSICAL EXAM:  T(C): 36.6 (09-26-17 @ 05:25), Max: 36.7 (09-25-17 @ 17:01)  HR: 55 (09-26-17 @ 05:25) (55 - 71)  BP: 126/72 (09-26-17 @ 05:25) (119/64 - 149/78)  RR: 16 (09-26-17 @ 05:25) (16 - 19)  SpO2: 100% (09-26-17 @ 05:25) (96% - 100%)  Wt(kg): --    no JVD  RRR, no murmurs  CTAB  soft nt/nd  no c/c/e    TELEMETRY: 	  NSR with blocked APCs  ECG:  	NSR, non-specific IVCD    Echo: pending  NST: n/a  Cath:  of RCA, otherwise normal cors  	  	  LABS:	 	                          11.9   6.97  )-----------( 204      ( 26 Sep 2017 07:10 )             35.8     09-26    142  |  102  |  21  ----------------------------<  164<H>  3.8   |  26  |  1.06    Ca    8.8      26 Sep 2017 07:10  Mg     2.9     09-26      proBNP:   Lipid Profile:   HgA1c:   TSH:     A/P) He is a pleasant 63 y/o male PMH mod-severe MR associated with a likely non-ischemic cardiomyopathy who was admitted to the hospital with dyspnea. EP is now called for abnormal tele. Review of telemetry shows blocked APCs. There is no evidence of Mobitz II AV block. He denies palpitations nor syncope. An official TTE is pending.    -no indications for PPM at this time  -f/u echo r/o severe MR and an associated non-ischemic cardiomyopathy  -if LVEF is low would change amiodarone to beta blockers  -get prior records (why is patient on amio?). If he has prior PAF then he would benefit from systemic A/C      Cameron Greene M.D., [facility]  [phone]
Patient is a 62y old  Male who presents with a chief complaint of Chest pain.      HPI:  63 y/o M with hx of "heart problems" HTN, HLD, DM, presents with Midsternal chest pain which radiates to the epigastric region x 1 day. Chest pain is intermittent and is associated with SOB. This Am patient also was very fatigued than usual Patient states he is able to walk a mile without getting out of breath. He moved from [facility] few months ago and few weeks ago he saw a PMD here who started him on American version of medications he was taking in [facility]. He was then instructed to follow up with a cardiologist. The cardiologist did an echo a week ago, but results are unavailable. Denies fever, chills, cough, falls, LOC, abdominal pain, nausea, vomiting, melena, hematochezia, LE edema, calf tenderness, d ysuria, diarrhea, constipation or melena.     As per daughter, patient has history of "heart attacks" but has no stents placed. Patient also mentioned he has "pericarditis." Daughter states they dont know exactly the name of the father medical issues because all medical documents are from [facility] and she does not know how to translate.   ****Additional history obtained from daughter as well.****    On admission: patient complains of mild chest pain, which is less than before and headache and overall body ache. BP: 180/94, HR: 77 O2: 94% on RA. EKG sinus without acute changes. Will place Oxygen and Will give Tylenlol 650x1. Will give home dose of isosorbide and ranexa for chest pain/elevated BP. cardiac enzymes sent. (24 Sep 2017 00:08)      PAST MEDICAL & SURGICAL HISTORY:  Heart problem  Hyperlipidemia  Hypertension  No significant past surgical history      Review of Systems:   CONSTITUTIONAL: No fever, weight loss, or fatigue  EYES: No eye pain, visual disturbances, or discharge  ENMT:  No difficulty hearing, tinnitus, vertigo; No sinus or throat pain  NECK: No pain or stiffness  BREASTS: No pain, masses, or nipple discharge  RESPIRATORY: No cough, wheezing, chills or hemoptysis; No shortness of breath  CARDIOVASCULAR: No chest pain, palpitations, dizziness, or leg swelling  GASTROINTESTINAL: No abdominal or epigastric pain. No nausea, vomiting, or hematemesis; No diarrhea or constipation. No melena or hematochezia.  GENITOURINARY: No dysuria, frequency, hematuria, or incontinence  NEUROLOGICAL: No headaches, memory loss, loss of strength, numbness, or tremors  SKIN: No itching, burning, rashes, or lesions   LYMPH NODES: No enlarged glands  ENDOCRINE: No heat or cold intolerance; No hair loss  MUSCULOSKELETAL: No joint pain or swelling; No muscle, back, or extremity pain  PSYCHIATRIC: No depression, anxiety, mood swings, or difficulty sleeping  HEME/LYMPH: No easy bruising, or bleeding gums  ALLERY AND IMMUNOLOGIC: No hives or eczema    Allergies    No Known Allergies    Intolerances        Social History:     FAMILY HISTORY:  No pertinent family history in first degree relatives      MEDICATIONS  (STANDING):  influenza   Vaccine 0.5 milliLiter(s) IntraMuscular once  sodium chloride 0.9% lock flush 3 milliLiter(s) IV Push every 8 hours  aspirin enteric coated 81 milliGRAM(s) Oral daily  losartan 100 milliGRAM(s) Oral daily  isosorbide   dinitrate Tablet (ISORDIL) 30 milliGRAM(s) Oral two times a day  ranolazine 500 milliGRAM(s) Oral two times a day  amiodarone    Tablet 200 milliGRAM(s) Oral daily  atorvastatin 40 milliGRAM(s) Oral at bedtime  furosemide    Tablet 40 milliGRAM(s) Oral daily  insulin lispro (HumaLOG) corrective regimen sliding scale   SubCutaneous three times a day before meals  insulin lispro (HumaLOG) corrective regimen sliding scale   SubCutaneous at bedtime  dextrose 5%. 1000 milliLiter(s) (50 mL/Hr) IV Continuous <Continuous>  dextrose 50% Injectable 12.5 Gram(s) IV Push once  dextrose 50% Injectable 25 Gram(s) IV Push once  dextrose 50% Injectable 25 Gram(s) IV Push once  heparin  Injectable 5000 Unit(s) SubCutaneous every 12 hours    MEDICATIONS  (PRN):  dextrose Gel 1 Dose(s) Oral once PRN Blood Glucose LESS THAN 70 milliGRAM(s)/deciliter  glucagon  Injectable 1 milliGRAM(s) IntraMuscular once PRN Glucose LESS THAN 70 milligrams/deciliter  acetaminophen   Tablet. 650 milliGRAM(s) Oral every 6 hours PRN Severe Pain (7 - 10)      CAPILLARY BLOOD GLUCOSE  212 (24 Sep 2017 21:28)  156 (24 Sep 2017 17:00)  232 (24 Sep 2017 12:36)  126 (24 Sep 2017 08:50)        I&O's Summary      PHYSICAL EXAM:  GENERAL: NAD, well-developed  HEAD:  Atraumatic, Normocephalic  EYES: EOMI, PERRLA, conjunctiva and sclera clear  NECK: Supple, No JVD  CHEST/LUNG: Clear to auscultation bilaterally; No wheeze  HEART: Regular rate and rhythm; No murmurs, rubs, or gallops  ABDOMEN: Soft, Nontender, Nondistended; Bowel sounds present  EXTREMITIES:  2+ Peripheral Pulses, No clubbing, cyanosis, or edema  PSYCH: AAOx3  NEUROLOGY: non-focal  SKIN: No rashes or lesions    LABS:                        11.6   7.96  )-----------( 170      ( 24 Sep 2017 05:42 )             34.5     09-24    144  |  104  |  17  ----------------------------<  119<H>  3.7   |  25  |  0.94    Ca    8.6      24 Sep 2017 05:42  Phos  3.7     09-24  Mg     1.4     09-24    TPro  7.2  /  Alb  4.1  /  TBili  0.4  /  DBili  x   /  AST  20  /  ALT  22  /  AlkPhos  82  09-23    PT/INR - ( 23 Sep 2017 17:45 )   PT: 12.0 SEC;   INR: 1.07          PTT - ( 23 Sep 2017 17:45 )  PTT:33.0 SEC  CARDIAC MARKERS ( 23 Sep 2017 23:10 )  x     / < 0.06 ng/mL / 39 u/L / 1.48 ng/mL / x      CARDIAC MARKERS ( 23 Sep 2017 16:40 )  x     / < 0.06 ng/mL / 43 u/L / 1.82 ng/mL / x            CAPILLARY BLOOD GLUCOSE  212 (24 Sep 2017 21:28)  156 (24 Sep 2017 17:00)  232 (24 Sep 2017 12:36)  126 (24 Sep 2017 08:50)                    RADIOLOGY & ADDITIONAL TESTS:    Imaging Personally Reviewed:    Consultant(s) Notes Reviewed:      Care Discussed with Consultants/Other Providers:    Thanks for consult. Will follow.

## 2017-09-26 NOTE — PROGRESS NOTE ADULT - SUBJECTIVE AND OBJECTIVE BOX
Patient is a 62y old  Male who presents with a chief complaint of Chest pain (24 Sep 2017 00:08)      SUBJECTIVE / OVERNIGHT EVENTS:   Feels better.  Denies CP/SOB/Palpitation/HA.    MEDICATIONS  (STANDING):  influenza   Vaccine 0.5 milliLiter(s) IntraMuscular once  sodium chloride 0.9% lock flush 3 milliLiter(s) IV Push every 8 hours  aspirin enteric coated 81 milliGRAM(s) Oral daily  losartan 100 milliGRAM(s) Oral daily  isosorbide   dinitrate Tablet (ISORDIL) 30 milliGRAM(s) Oral two times a day  ranolazine 500 milliGRAM(s) Oral two times a day  amiodarone    Tablet 200 milliGRAM(s) Oral daily  atorvastatin 40 milliGRAM(s) Oral at bedtime  furosemide    Tablet 40 milliGRAM(s) Oral daily  insulin lispro (HumaLOG) corrective regimen sliding scale   SubCutaneous three times a day before meals  insulin lispro (HumaLOG) corrective regimen sliding scale   SubCutaneous at bedtime  dextrose 5%. 1000 milliLiter(s) (50 mL/Hr) IV Continuous <Continuous>  dextrose 50% Injectable 12.5 Gram(s) IV Push once  dextrose 50% Injectable 25 Gram(s) IV Push once  dextrose 50% Injectable 25 Gram(s) IV Push once  heparin  Injectable 5000 Unit(s) SubCutaneous every 12 hours  pantoprazole    Tablet 40 milliGRAM(s) Oral before breakfast    MEDICATIONS  (PRN):  dextrose Gel 1 Dose(s) Oral once PRN Blood Glucose LESS THAN 70 milliGRAM(s)/deciliter  glucagon  Injectable 1 milliGRAM(s) IntraMuscular once PRN Glucose LESS THAN 70 milligrams/deciliter  acetaminophen   Tablet. 650 milliGRAM(s) Oral every 6 hours PRN Severe Pain (7 - 10)        CAPILLARY BLOOD GLUCOSE  136 (26 Sep 2017 23:23)  231 (26 Sep 2017 17:28)  298 (26 Sep 2017 12:18)  210 (26 Sep 2017 09:24)        I&O's Summary      PHYSICAL EXAM:  GENERAL: NAD, well-developed  HEAD:  Atraumatic, Normocephalic  EYES: conjunctiva and sclera clear  NECK: Supple, No JVD  CHEST/LUNG: Clear to auscultation bilaterally; No wheeze  HEART: Regular rate and rhythm; No murmurs, rubs, or gallops  ABDOMEN: Soft, Nontender, Nondistended; Bowel sounds present  EXTREMITIES:  2+ Peripheral Pulses, No clubbing, cyanosis, or edema  NEUROLOGY: AAO X 3  SKIN: No rashes    LABS:                        11.9   6.97  )-----------( 204      ( 26 Sep 2017 07:10 )             35.8     09-26    142  |  102  |  21  ----------------------------<  164<H>  3.8   |  26  |  1.06    Ca    8.8      26 Sep 2017 07:10  Mg     2.9     09-26        CARDIAC MARKERS ( 26 Sep 2017 18:05 )  x     / < 0.06 ng/mL / x     / x     / x            CAPILLARY BLOOD GLUCOSE  136 (26 Sep 2017 23:23)  231 (26 Sep 2017 17:28)  298 (26 Sep 2017 12:18)  210 (26 Sep 2017 09:24)                    RADIOLOGY & ADDITIONAL TESTS:    Imaging Personally Reviewed:    Consultant(s) Notes Reviewed:      Care Discussed with Consultants/Other Providers:

## 2017-09-27 LAB
BUN SERPL-MCNC: 24 MG/DL — HIGH (ref 7–23)
CALCIUM SERPL-MCNC: 8.7 MG/DL — SIGNIFICANT CHANGE UP (ref 8.4–10.5)
CHLORIDE SERPL-SCNC: 103 MMOL/L — SIGNIFICANT CHANGE UP (ref 98–107)
CO2 SERPL-SCNC: 26 MMOL/L — SIGNIFICANT CHANGE UP (ref 22–31)
CREAT SERPL-MCNC: 1.14 MG/DL — SIGNIFICANT CHANGE UP (ref 0.5–1.3)
GLUCOSE SERPL-MCNC: 157 MG/DL — HIGH (ref 70–99)
HCT VFR BLD CALC: 36.2 % — LOW (ref 39–50)
HGB BLD-MCNC: 12.1 G/DL — LOW (ref 13–17)
MAGNESIUM SERPL-MCNC: 1.9 MG/DL — SIGNIFICANT CHANGE UP (ref 1.6–2.6)
MCHC RBC-ENTMCNC: 30 PG — SIGNIFICANT CHANGE UP (ref 27–34)
MCHC RBC-ENTMCNC: 33.4 % — SIGNIFICANT CHANGE UP (ref 32–36)
MCV RBC AUTO: 89.8 FL — SIGNIFICANT CHANGE UP (ref 80–100)
NRBC # FLD: 0 — SIGNIFICANT CHANGE UP
PLATELET # BLD AUTO: 223 K/UL — SIGNIFICANT CHANGE UP (ref 150–400)
PMV BLD: 11.3 FL — SIGNIFICANT CHANGE UP (ref 7–13)
POTASSIUM SERPL-MCNC: 3.9 MMOL/L — SIGNIFICANT CHANGE UP (ref 3.5–5.3)
POTASSIUM SERPL-SCNC: 3.9 MMOL/L — SIGNIFICANT CHANGE UP (ref 3.5–5.3)
RBC # BLD: 4.03 M/UL — LOW (ref 4.2–5.8)
RBC # FLD: 12.6 % — SIGNIFICANT CHANGE UP (ref 10.3–14.5)
SODIUM SERPL-SCNC: 143 MMOL/L — SIGNIFICANT CHANGE UP (ref 135–145)
WBC # BLD: 7.63 K/UL — SIGNIFICANT CHANGE UP (ref 3.8–10.5)
WBC # FLD AUTO: 7.63 K/UL — SIGNIFICANT CHANGE UP (ref 3.8–10.5)

## 2017-09-27 PROCEDURE — 93010 ELECTROCARDIOGRAM REPORT: CPT

## 2017-09-27 PROCEDURE — 93306 TTE W/DOPPLER COMPLETE: CPT | Mod: 26

## 2017-09-27 RX ORDER — SENNA PLUS 8.6 MG/1
2 TABLET ORAL AT BEDTIME
Qty: 0 | Refills: 0 | Status: DISCONTINUED | OUTPATIENT
Start: 2017-09-27 | End: 2017-10-01

## 2017-09-27 RX ADMIN — LOSARTAN POTASSIUM 100 MILLIGRAM(S): 100 TABLET, FILM COATED ORAL at 05:05

## 2017-09-27 RX ADMIN — Medication 81 MILLIGRAM(S): at 12:54

## 2017-09-27 RX ADMIN — RANOLAZINE 500 MILLIGRAM(S): 500 TABLET, FILM COATED, EXTENDED RELEASE ORAL at 05:05

## 2017-09-27 RX ADMIN — ISOSORBIDE DINITRATE 30 MILLIGRAM(S): 5 TABLET ORAL at 05:05

## 2017-09-27 RX ADMIN — SODIUM CHLORIDE 3 MILLILITER(S): 9 INJECTION INTRAMUSCULAR; INTRAVENOUS; SUBCUTANEOUS at 05:04

## 2017-09-27 RX ADMIN — SODIUM CHLORIDE 3 MILLILITER(S): 9 INJECTION INTRAMUSCULAR; INTRAVENOUS; SUBCUTANEOUS at 13:00

## 2017-09-27 RX ADMIN — SENNA PLUS 2 TABLET(S): 8.6 TABLET ORAL at 21:25

## 2017-09-27 RX ADMIN — Medication 2: at 09:32

## 2017-09-27 RX ADMIN — PANTOPRAZOLE SODIUM 40 MILLIGRAM(S): 20 TABLET, DELAYED RELEASE ORAL at 05:05

## 2017-09-27 RX ADMIN — Medication 650 MILLIGRAM(S): at 21:25

## 2017-09-27 RX ADMIN — ISOSORBIDE DINITRATE 30 MILLIGRAM(S): 5 TABLET ORAL at 17:34

## 2017-09-27 RX ADMIN — Medication 650 MILLIGRAM(S): at 22:25

## 2017-09-27 RX ADMIN — HEPARIN SODIUM 5000 UNIT(S): 5000 INJECTION INTRAVENOUS; SUBCUTANEOUS at 17:34

## 2017-09-27 RX ADMIN — SODIUM CHLORIDE 3 MILLILITER(S): 9 INJECTION INTRAMUSCULAR; INTRAVENOUS; SUBCUTANEOUS at 21:24

## 2017-09-27 RX ADMIN — Medication 1: at 14:53

## 2017-09-27 RX ADMIN — Medication 1: at 18:27

## 2017-09-27 RX ADMIN — Medication 40 MILLIGRAM(S): at 05:05

## 2017-09-27 RX ADMIN — ATORVASTATIN CALCIUM 40 MILLIGRAM(S): 80 TABLET, FILM COATED ORAL at 21:25

## 2017-09-27 RX ADMIN — RANOLAZINE 500 MILLIGRAM(S): 500 TABLET, FILM COATED, EXTENDED RELEASE ORAL at 17:32

## 2017-09-27 RX ADMIN — HEPARIN SODIUM 5000 UNIT(S): 5000 INJECTION INTRAVENOUS; SUBCUTANEOUS at 05:05

## 2017-09-27 RX ADMIN — AMIODARONE HYDROCHLORIDE 200 MILLIGRAM(S): 400 TABLET ORAL at 10:18

## 2017-09-27 NOTE — PROGRESS NOTE ADULT - SUBJECTIVE AND OBJECTIVE BOX
Patient is a 62y old  Male who presents with a chief complaint of Chest pain (24 Sep 2017 00:08)      SUBJECTIVE / OVERNIGHT EVENTS:   Feels better.  Denies CP/SOB/Palpitation/HA.    MEDICATIONS  (STANDING):  influenza   Vaccine 0.5 milliLiter(s) IntraMuscular once  sodium chloride 0.9% lock flush 3 milliLiter(s) IV Push every 8 hours  aspirin enteric coated 81 milliGRAM(s) Oral daily  losartan 100 milliGRAM(s) Oral daily  isosorbide   dinitrate Tablet (ISORDIL) 30 milliGRAM(s) Oral two times a day  ranolazine 500 milliGRAM(s) Oral two times a day  amiodarone    Tablet 200 milliGRAM(s) Oral daily  atorvastatin 40 milliGRAM(s) Oral at bedtime  furosemide    Tablet 40 milliGRAM(s) Oral daily  insulin lispro (HumaLOG) corrective regimen sliding scale   SubCutaneous three times a day before meals  insulin lispro (HumaLOG) corrective regimen sliding scale   SubCutaneous at bedtime  dextrose 5%. 1000 milliLiter(s) (50 mL/Hr) IV Continuous <Continuous>  dextrose 50% Injectable 12.5 Gram(s) IV Push once  dextrose 50% Injectable 25 Gram(s) IV Push once  dextrose 50% Injectable 25 Gram(s) IV Push once  heparin  Injectable 5000 Unit(s) SubCutaneous every 12 hours  pantoprazole    Tablet 40 milliGRAM(s) Oral before breakfast  senna 2 Tablet(s) Oral at bedtime    MEDICATIONS  (PRN):  dextrose Gel 1 Dose(s) Oral once PRN Blood Glucose LESS THAN 70 milliGRAM(s)/deciliter  glucagon  Injectable 1 milliGRAM(s) IntraMuscular once PRN Glucose LESS THAN 70 milligrams/deciliter  acetaminophen   Tablet. 650 milliGRAM(s) Oral every 6 hours PRN Severe Pain (7 - 10)        CAPILLARY BLOOD GLUCOSE  192 (27 Sep 2017 22:06)  158 (27 Sep 2017 17:34)  154 (27 Sep 2017 14:55)  298 (27 Sep 2017 12:25)  240 (27 Sep 2017 09:27)        I&O's Summary      PHYSICAL EXAM:  GENERAL: NAD, well-developed  HEAD:  Atraumatic, Normocephalic  EYES: conjunctiva and sclera clear  NECK: Supple, No JVD  CHEST/LUNG: Clear to auscultation bilaterally; No wheeze  HEART: Regular rate and rhythm; No murmurs, rubs, or gallops  ABDOMEN: Soft, Nontender, Nondistended; Bowel sounds present  EXTREMITIES:  2+ Peripheral Pulses, No clubbing, cyanosis, or edema  NEUROLOGY: AAO X 3  SKIN: No rashes    LABS:                        12.1   7.63  )-----------( 223      ( 27 Sep 2017 05:55 )             36.2     09-27    143  |  103  |  24<H>  ----------------------------<  157<H>  3.9   |  26  |  1.14    Ca    8.7      27 Sep 2017 05:55  Mg     1.9     09-27        CARDIAC MARKERS ( 26 Sep 2017 18:05 )  x     / < 0.06 ng/mL / x     / x     / x            CAPILLARY BLOOD GLUCOSE  192 (27 Sep 2017 22:06)  158 (27 Sep 2017 17:34)  154 (27 Sep 2017 14:55)  298 (27 Sep 2017 12:25)  240 (27 Sep 2017 09:27)                    RADIOLOGY & ADDITIONAL TESTS:    Imaging Personally Reviewed:    Consultant(s) Notes Reviewed:      Care Discussed with Consultants/Other Providers:

## 2017-09-27 NOTE — CHART NOTE - NSCHARTNOTEFT_GEN_A_CORE
Called by RN. Patient complaining of left sided "Heart pain." Patient found resting comfortably in bed with daughter at bedside. He states pain is similar from before and feels small pinch coming from heart. Denies SOB, Palpitations, Dizziness or HA. Vitals are stable and EKG is unchanged from previous. Patient refusing labs and wants to try Tylenol for pain. RN will administer.

## 2017-09-27 NOTE — PROGRESS NOTE ADULT - SUBJECTIVE AND OBJECTIVE BOX
EP ATTENDING    Tele: NSR, blocked APCs    no palpitations, no syncope, no angina    influenza   Vaccine 0.5 milliLiter(s) IntraMuscular once  sodium chloride 0.9% lock flush 3 milliLiter(s) IV Push every 8 hours  aspirin enteric coated 81 milliGRAM(s) Oral daily  losartan 100 milliGRAM(s) Oral daily  isosorbide   dinitrate Tablet (ISORDIL) 30 milliGRAM(s) Oral two times a day  ranolazine 500 milliGRAM(s) Oral two times a day  amiodarone    Tablet 200 milliGRAM(s) Oral daily  atorvastatin 40 milliGRAM(s) Oral at bedtime  furosemide    Tablet 40 milliGRAM(s) Oral daily  insulin lispro (HumaLOG) corrective regimen sliding scale   SubCutaneous three times a day before meals  insulin lispro (HumaLOG) corrective regimen sliding scale   SubCutaneous at bedtime  dextrose 5%. 1000 milliLiter(s) IV Continuous <Continuous>  dextrose Gel 1 Dose(s) Oral once PRN  dextrose 50% Injectable 12.5 Gram(s) IV Push once  dextrose 50% Injectable 25 Gram(s) IV Push once  dextrose 50% Injectable 25 Gram(s) IV Push once  glucagon  Injectable 1 milliGRAM(s) IntraMuscular once PRN  heparin  Injectable 5000 Unit(s) SubCutaneous every 12 hours  acetaminophen   Tablet. 650 milliGRAM(s) Oral every 6 hours PRN  pantoprazole    Tablet 40 milliGRAM(s) Oral before breakfast                            12.1   7.63  )-----------( 223      ( 27 Sep 2017 05:55 )             36.2       09-27    143  |  103  |  24<H>  ----------------------------<  157<H>  3.9   |  26  |  1.14    Ca    8.7      27 Sep 2017 05:55  Mg     1.9     09-27        CARDIAC MARKERS ( 26 Sep 2017 18:05 )  x     / < 0.06 ng/mL / x     / x     / x            T(C): 36.4 (09-27-17 @ 10:35), Max: 36.8 (09-26-17 @ 15:48)  HR: 81 (09-27-17 @ 10:35) (57 - 81)  BP: 124/70 (09-27-17 @ 10:35) (119/70 - 148/81)  RR: 20 (09-27-17 @ 10:35) (16 - 20)  SpO2: 100% (09-27-17 @ 10:35) (97% - 100%)  Wt(kg): --    no JVD  RRR, no murmurs  CTAB  soft nt/nd  no c/c/e        A/P) He is a pleasant 63 y/o male PMH mod-severe MR associated with a likely non-ischemic cardiomyopathy who was admitted to the hospital with dyspnea. EP is now called for abnormal tele. Review of telemetry shows blocked APCs. There is no evidence of Mobitz II AV block. He denies palpitations nor syncope. An official TTE is pending.    -no indications for PPM at this time  -f/u echo r/o severe MR and an associated non-ischemic cardiomyopathy  -if LVEF is low would change amiodarone to beta blockers  -get prior records (why is patient on amio?). If he has prior PAF then he would benefit from systemic A/C      Cameron Greene M.D., [facility]  [phone]

## 2017-09-28 LAB
APPEARANCE UR: CLEAR — SIGNIFICANT CHANGE UP
BACTERIA # UR AUTO: SIGNIFICANT CHANGE UP
BILIRUB UR-MCNC: NEGATIVE — SIGNIFICANT CHANGE UP
BLOOD UR QL VISUAL: NEGATIVE — SIGNIFICANT CHANGE UP
BUN SERPL-MCNC: 23 MG/DL — SIGNIFICANT CHANGE UP (ref 7–23)
CALCIUM SERPL-MCNC: 8.2 MG/DL — LOW (ref 8.4–10.5)
CHLORIDE SERPL-SCNC: 103 MMOL/L — SIGNIFICANT CHANGE UP (ref 98–107)
CO2 SERPL-SCNC: 25 MMOL/L — SIGNIFICANT CHANGE UP (ref 22–31)
COLOR SPEC: YELLOW — SIGNIFICANT CHANGE UP
CREAT SERPL-MCNC: 1.05 MG/DL — SIGNIFICANT CHANGE UP (ref 0.5–1.3)
GLUCOSE SERPL-MCNC: 200 MG/DL — HIGH (ref 70–99)
GLUCOSE UR-MCNC: 500 — SIGNIFICANT CHANGE UP
HCT VFR BLD CALC: 36.3 % — LOW (ref 39–50)
HGB BLD-MCNC: 12 G/DL — LOW (ref 13–17)
KETONES UR-MCNC: NEGATIVE — SIGNIFICANT CHANGE UP
LEUKOCYTE ESTERASE UR-ACNC: NEGATIVE — SIGNIFICANT CHANGE UP
MCHC RBC-ENTMCNC: 29.8 PG — SIGNIFICANT CHANGE UP (ref 27–34)
MCHC RBC-ENTMCNC: 33.1 % — SIGNIFICANT CHANGE UP (ref 32–36)
MCV RBC AUTO: 90.1 FL — SIGNIFICANT CHANGE UP (ref 80–100)
NITRITE UR-MCNC: NEGATIVE — SIGNIFICANT CHANGE UP
NRBC # FLD: 0 — SIGNIFICANT CHANGE UP
PH UR: 6 — SIGNIFICANT CHANGE UP (ref 4.6–8)
PLATELET # BLD AUTO: 218 K/UL — SIGNIFICANT CHANGE UP (ref 150–400)
PMV BLD: 11.4 FL — SIGNIFICANT CHANGE UP (ref 7–13)
POTASSIUM SERPL-MCNC: 4 MMOL/L — SIGNIFICANT CHANGE UP (ref 3.5–5.3)
POTASSIUM SERPL-SCNC: 4 MMOL/L — SIGNIFICANT CHANGE UP (ref 3.5–5.3)
PROT UR-MCNC: NEGATIVE — SIGNIFICANT CHANGE UP
RBC # BLD: 4.03 M/UL — LOW (ref 4.2–5.8)
RBC # FLD: 12.5 % — SIGNIFICANT CHANGE UP (ref 10.3–14.5)
RBC CASTS # UR COMP ASSIST: SIGNIFICANT CHANGE UP (ref 0–?)
SODIUM SERPL-SCNC: 141 MMOL/L — SIGNIFICANT CHANGE UP (ref 135–145)
SP GR SPEC: 1.01 — SIGNIFICANT CHANGE UP (ref 1–1.03)
SQUAMOUS # UR AUTO: SIGNIFICANT CHANGE UP
UROBILINOGEN FLD QL: 1 E.U. — SIGNIFICANT CHANGE UP (ref 0.1–0.2)
WBC # BLD: 6.83 K/UL — SIGNIFICANT CHANGE UP (ref 3.8–10.5)
WBC # FLD AUTO: 6.83 K/UL — SIGNIFICANT CHANGE UP (ref 3.8–10.5)
WBC UR QL: SIGNIFICANT CHANGE UP (ref 0–?)

## 2017-09-28 RX ORDER — ENOXAPARIN SODIUM 100 MG/ML
100 INJECTION SUBCUTANEOUS
Qty: 0 | Refills: 0 | Status: DISCONTINUED | OUTPATIENT
Start: 2017-09-28 | End: 2017-10-01

## 2017-09-28 RX ORDER — METOPROLOL TARTRATE 50 MG
25 TABLET ORAL DAILY
Qty: 0 | Refills: 0 | Status: DISCONTINUED | OUTPATIENT
Start: 2017-09-28 | End: 2017-10-01

## 2017-09-28 RX ORDER — WARFARIN SODIUM 2.5 MG/1
5 TABLET ORAL ONCE
Qty: 0 | Refills: 0 | Status: COMPLETED | OUTPATIENT
Start: 2017-09-28 | End: 2017-09-28

## 2017-09-28 RX ADMIN — ISOSORBIDE DINITRATE 30 MILLIGRAM(S): 5 TABLET ORAL at 17:11

## 2017-09-28 RX ADMIN — SODIUM CHLORIDE 3 MILLILITER(S): 9 INJECTION INTRAMUSCULAR; INTRAVENOUS; SUBCUTANEOUS at 13:53

## 2017-09-28 RX ADMIN — SODIUM CHLORIDE 3 MILLILITER(S): 9 INJECTION INTRAMUSCULAR; INTRAVENOUS; SUBCUTANEOUS at 05:00

## 2017-09-28 RX ADMIN — PANTOPRAZOLE SODIUM 40 MILLIGRAM(S): 20 TABLET, DELAYED RELEASE ORAL at 05:08

## 2017-09-28 RX ADMIN — LOSARTAN POTASSIUM 100 MILLIGRAM(S): 100 TABLET, FILM COATED ORAL at 05:07

## 2017-09-28 RX ADMIN — ATORVASTATIN CALCIUM 40 MILLIGRAM(S): 80 TABLET, FILM COATED ORAL at 21:41

## 2017-09-28 RX ADMIN — SODIUM CHLORIDE 3 MILLILITER(S): 9 INJECTION INTRAMUSCULAR; INTRAVENOUS; SUBCUTANEOUS at 21:42

## 2017-09-28 RX ADMIN — Medication 40 MILLIGRAM(S): at 05:08

## 2017-09-28 RX ADMIN — Medication 81 MILLIGRAM(S): at 11:42

## 2017-09-28 RX ADMIN — Medication 1: at 13:19

## 2017-09-28 RX ADMIN — HEPARIN SODIUM 5000 UNIT(S): 5000 INJECTION INTRAVENOUS; SUBCUTANEOUS at 05:08

## 2017-09-28 RX ADMIN — Medication 2: at 09:12

## 2017-09-28 RX ADMIN — ENOXAPARIN SODIUM 100 MILLIGRAM(S): 100 INJECTION SUBCUTANEOUS at 17:11

## 2017-09-28 RX ADMIN — RANOLAZINE 500 MILLIGRAM(S): 500 TABLET, FILM COATED, EXTENDED RELEASE ORAL at 05:08

## 2017-09-28 RX ADMIN — SENNA PLUS 2 TABLET(S): 8.6 TABLET ORAL at 21:41

## 2017-09-28 RX ADMIN — Medication 1: at 23:10

## 2017-09-28 RX ADMIN — ISOSORBIDE DINITRATE 30 MILLIGRAM(S): 5 TABLET ORAL at 05:07

## 2017-09-28 RX ADMIN — RANOLAZINE 500 MILLIGRAM(S): 500 TABLET, FILM COATED, EXTENDED RELEASE ORAL at 17:11

## 2017-09-28 RX ADMIN — Medication 25 MILLIGRAM(S): at 11:42

## 2017-09-28 RX ADMIN — Medication 3: at 17:49

## 2017-09-28 RX ADMIN — WARFARIN SODIUM 5 MILLIGRAM(S): 2.5 TABLET ORAL at 17:11

## 2017-09-28 NOTE — PROVIDER CONTACT NOTE (OTHER) - ACTION/TREATMENT ORDERED:
administer tylenol as per request of patient at this time. 12 lead ekg performed. 2L nasal cannula applied.

## 2017-09-28 NOTE — PROGRESS NOTE ADULT - SUBJECTIVE AND OBJECTIVE BOX
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  Thrombus is visualized within the aneurysm cavity.        MEDICATIONS  (STANDING):  influenza   Vaccine 0.5 milliLiter(s) IntraMuscular once  sodium chloride 0.9% lock flush 3 milliLiter(s) IV Push every 8 hours  aspirin enteric coated 81 milliGRAM(s) Oral daily  losartan 100 milliGRAM(s) Oral daily  isosorbide   dinitrate Tablet (ISORDIL) 30 milliGRAM(s) Oral two times a day  ranolazine 500 milliGRAM(s) Oral two times a day  amiodarone    Tablet 200 milliGRAM(s) Oral daily  atorvastatin 40 milliGRAM(s) Oral at bedtime  furosemide    Tablet 40 milliGRAM(s) Oral daily  insulin lispro (HumaLOG) corrective regimen sliding scale   SubCutaneous three times a day before meals  insulin lispro (HumaLOG) corrective regimen sliding scale   SubCutaneous at bedtime  heparin  Injectable 5000 Unit(s) SubCutaneous every 12 hours  pantoprazole    Tablet 40 milliGRAM(s) Oral before breakfast  senna 2 Tablet(s) Oral at bedtime    MEDICATIONS  (PRN):  dextrose Gel 1 Dose(s) Oral once PRN Blood Glucose LESS THAN 70 milliGRAM(s)/deciliter  glucagon  Injectable 1 milliGRAM(s) IntraMuscular once PRN Glucose LESS THAN 70 milligrams/deciliter  acetaminophen   Tablet. 650 milliGRAM(s) Oral every 6 hours PRN Severe Pain (7 - 10)          FAMILY HISTORY:  No pertinent family history in first degree relatives  No family history of premature coronary artery disease or sudden cardiac death      REVIEW OF SYSTEMS:  Constitutional: [ ] fever, [ ]weight loss,  [x ]fatigue  Eyes: [ ] visual changes  Respiratory: [ ]shortness of breath;  [ ] cough, [ ]wheezing, [ ]chills, [ ]hemoptysis  Cardiovascular: [x ] chest pain, [ ]palpitations, [ ]dizziness,  [ ]leg swelling  Gastrointestinal: [ ] abdominal pain, [ ]nausea, [ ]vomiting,  [ ]diarrhea   Genitourinary: [ ] dysuria, [ ] hematuria  Neurologic: [ ] headaches [ ] tremors  Skin: [ ] itching, [ ]burning, [ ] rashes  Endocrine: [ ] heat or cold intolerance  Musculoskeletal: [ ] joint pain or swelling; [ ] muscle, back, or extremity pain  Psychiatric: [ ] depression, [ ]anxiety, [ ]mood swings, or [ ]difficulty sleeping  Hematologic: [ ] easy bruising, [ ] bleeding gums    [x] All remaining systems negative except as per above.     Vital Signs Last 24 Hrs  T(C): 36.4 (28 Sep 2017 05:04), Max: 36.7 (27 Sep 2017 19:51)  T(F): 97.5 (28 Sep 2017 05:04), Max: 98 (27 Sep 2017 19:51)  HR: 56 (28 Sep 2017 05:04) (56 - 81)  BP: 124/80 (28 Sep 2017 05:04) (124/70 - 133/77)  RR: 18 (28 Sep 2017 05:04) (18 - 20)  SpO2: 99% (28 Sep 2017 05:04) (98% - 100%)  I&O's Summary      PHYSICAL EXAM:  General: No acute distress  HEENT: EOMI, PERRL  Neck: Supple, No JVD  Lungs: Clear to percussion bilaterally; No rales or wheezing  Heart: Regular rate and rhythm; 2/6 systolic murmur,no rubs, or gallops  Abdomen: Nontender, bowel sounds present  Extremities: No clubbing, cyanosis, or edema  Nervous system:  Alert & Oriented X3, no focal deficits  Psychiatric: Normal affect  Skin: No rashes or lesions    LABS:  09-27    143  |  103  |  24<H>  ----------------------------<  157<H>  3.9   |  26  |  1.14    Ca    8.7      27 Sep 2017 05:55  Mg     1.9     09-27      Creatinine Trend: 1.14<--, 1.06<--, 1.01<--, 0.94<--, 1.00<--                        12.1   7.63  )-----------( 223      ( 27 Sep 2017 05:55 )             36.2       Lipid Panel:   Cardiac Enzymes: CARDIAC MARKERS ( 26 Sep 2017 18:05 )  x     / < 0.06 ng/mL / x     / x     / x                ECG [my interpretation]: Sinus rhythm with 1st degree A-V block with Premature atrial complexes  Inferior infarct , age undetermined      TELEMETRY:Sinus rhythm no sig bradyarrhythmias    ECHO:  Severe segmental left ventricular systolic dysfunction. 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    Thrombus isvisualized within the aneurysm cavity.   Calcified trileaflet aortic valve with decreased opening. Peak transaortic valve gradient equals 27 mm Hg, mean transaortic valve gradient equals 20 mm Hg, consistent with probable mild to moderate aortic stenosis.  Mitral Valve: Mitral annular calcification, otherwise normal mitral valve. Moderate-severe mitral regurgitation.

## 2017-09-28 NOTE — PROVIDER CONTACT NOTE (OTHER) - ASSESSMENT
pt A&OX4. pt c/o new chest pain described as different from admission chest pain. pt c/o left sided mild intermittent sharp pain that does not radiate. pt is in no distress. Temp 98. HR 68. /74. RR 18. 98% oxygen saturation on room air. pt refusing cardiac enzymes to be drawn

## 2017-09-28 NOTE — PROGRESS NOTE ADULT - SUBJECTIVE AND OBJECTIVE BOX
Patient is a 62y old  Male who presents with a chief complaint of Chest pain (24 Sep 2017 00:08)      SUBJECTIVE / OVERNIGHT EVENTS:   Feels better.  Denies CP/SOB/Palpitation/HA.    MEDICATIONS  (STANDING):  influenza   Vaccine 0.5 milliLiter(s) IntraMuscular once  sodium chloride 0.9% lock flush 3 milliLiter(s) IV Push every 8 hours  aspirin enteric coated 81 milliGRAM(s) Oral daily  losartan 100 milliGRAM(s) Oral daily  isosorbide   dinitrate Tablet (ISORDIL) 30 milliGRAM(s) Oral two times a day  ranolazine 500 milliGRAM(s) Oral two times a day  atorvastatin 40 milliGRAM(s) Oral at bedtime  furosemide    Tablet 40 milliGRAM(s) Oral daily  insulin lispro (HumaLOG) corrective regimen sliding scale   SubCutaneous three times a day before meals  insulin lispro (HumaLOG) corrective regimen sliding scale   SubCutaneous at bedtime  dextrose 5%. 1000 milliLiter(s) (50 mL/Hr) IV Continuous <Continuous>  dextrose 50% Injectable 12.5 Gram(s) IV Push once  dextrose 50% Injectable 25 Gram(s) IV Push once  dextrose 50% Injectable 25 Gram(s) IV Push once  pantoprazole    Tablet 40 milliGRAM(s) Oral before breakfast  senna 2 Tablet(s) Oral at bedtime  enoxaparin Injectable 100 milliGRAM(s) SubCutaneous two times a day  metoprolol succinate ER 25 milliGRAM(s) Oral daily    MEDICATIONS  (PRN):  dextrose Gel 1 Dose(s) Oral once PRN Blood Glucose LESS THAN 70 milliGRAM(s)/deciliter  glucagon  Injectable 1 milliGRAM(s) IntraMuscular once PRN Glucose LESS THAN 70 milligrams/deciliter  acetaminophen   Tablet. 650 milliGRAM(s) Oral every 6 hours PRN Severe Pain (7 - 10)        CAPILLARY BLOOD GLUCOSE  276 (28 Sep 2017 22:04)  277 (28 Sep 2017 17:39)  170 (28 Sep 2017 12:38)  214 (28 Sep 2017 08:45)        I&O's Summary      PHYSICAL EXAM:  GENERAL: NAD, well-developed  HEAD:  Atraumatic, Normocephalic  EYES: conjunctiva and sclera clear  NECK: Supple, No JVD  CHEST/LUNG: Clear to auscultation bilaterally; No wheeze  HEART: Regular rate and rhythm; No murmurs, rubs, or gallops  ABDOMEN: Soft, Nontender, Nondistended; Bowel sounds present  EXTREMITIES:  2+ Peripheral Pulses, No clubbing, cyanosis, or edema  NEUROLOGY: AAO X 3  SKIN: No rashes    LABS:                        12.0   6.83  )-----------( 218      ( 28 Sep 2017 05:34 )             36.3         141  |  103  |  23  ----------------------------<  200<H>  4.0   |  25  |  1.05    Ca    8.2<L>      28 Sep 2017 05:34  Mg     1.9                 Urinalysis Basic - ( 28 Sep 2017 17:15 )    Color: YELLOW / Appearance: CLEAR / S.015 / pH: 6.0  Gluc: 500 / Ketone: NEGATIVE  / Bili: NEGATIVE / Urobili: 1 E.U.   Blood: NEGATIVE / Protein: NEGATIVE / Nitrite: NEGATIVE   Leuk Esterase: NEGATIVE / RBC: 0-2 / WBC 0-2   Sq Epi: OCC / Non Sq Epi: x / Bacteria: FEW      CAPILLARY BLOOD GLUCOSE  276 (28 Sep 2017 22:04)  277 (28 Sep 2017 17:39)  170 (28 Sep 2017 12:38)  214 (28 Sep 2017 08:45)                    RADIOLOGY & ADDITIONAL TESTS:    Imaging Personally Reviewed:    Consultant(s) Notes Reviewed:      Care Discussed with Consultants/Other Providers:

## 2017-09-28 NOTE — PROGRESS NOTE ADULT - SUBJECTIVE AND OBJECTIVE BOX
EP     Tele: NSR, blocked APCs    no palpitations, no syncope, no angina      MEDICATIONS  (STANDING):  influenza   Vaccine 0.5 milliLiter(s) IntraMuscular once  sodium chloride 0.9% lock flush 3 milliLiter(s) IV Push every 8 hours  aspirin enteric coated 81 milliGRAM(s) Oral daily  losartan 100 milliGRAM(s) Oral daily  isosorbide   dinitrate Tablet (ISORDIL) 30 milliGRAM(s) Oral two times a day  ranolazine 500 milliGRAM(s) Oral two times a day  amiodarone    Tablet 200 milliGRAM(s) Oral daily  atorvastatin 40 milliGRAM(s) Oral at bedtime  furosemide    Tablet 40 milliGRAM(s) Oral daily  insulin lispro (HumaLOG) corrective regimen sliding scale   SubCutaneous three times a day before meals  insulin lispro (HumaLOG) corrective regimen sliding scale   SubCutaneous at bedtime  dextrose 5%. 1000 milliLiter(s) (50 mL/Hr) IV Continuous <Continuous>  dextrose 50% Injectable 12.5 Gram(s) IV Push once  dextrose 50% Injectable 25 Gram(s) IV Push once  dextrose 50% Injectable 25 Gram(s) IV Push once  pantoprazole    Tablet 40 milliGRAM(s) Oral before breakfast  senna 2 Tablet(s) Oral at bedtime  enoxaparin Injectable 100 milliGRAM(s) SubCutaneous two times a day  warfarin 5 milliGRAM(s) Oral once    LABS:                        12.0   6.83  )-----------( 218      ( 28 Sep 2017 05:34 )             36.3     Hemoglobin: 12.0 g/dL (09-28 @ 05:34)  Hemoglobin: 12.1 g/dL (09-27 @ 05:55)  Hemoglobin: 11.9 g/dL (09-26 @ 07:10)  Hemoglobin: 12.0 g/dL (09-25 @ 05:45)  Hemoglobin: 11.6 g/dL (09-24 @ 05:42)    141  |  103  |  23  ----------------------------<  200<H>  4.0   |  25  |  1.05    Ca    8.2<L>      28 Sep 2017 05:34  Mg     1.9     09-27    Creatinine Trend: 1.05<--, 1.14<--, 1.06<--, 1.01<--, 0.94<--, 1.00<--     CARDIAC MARKERS ( 26 Sep 2017 18:05 )  x     / < 0.06 ng/mL / x     / x     / x        PHYSICAL EXAM  Vital Signs Last 24 Hrs  T(C): 36.4 (28 Sep 2017 05:04), Max: 36.7 (27 Sep 2017 19:51)  T(F): 97.5 (28 Sep 2017 05:04), Max: 98 (27 Sep 2017 19:51)  HR: 56 (28 Sep 2017 05:04) (56 - 79)  BP: 124/80 (28 Sep 2017 05:04) (124/80 - 133/77)  RR: 18 (28 Sep 2017 05:04) (18 - 18)  SpO2: 99% (28 Sep 2017 05:04) (98% - 99%)    no JVD  RRR, no murmurs  CTAB  soft nt/nd  no c/c/e    DIAGNOSTIC DATA:    < from: TTE with Doppler (w/Cont) (09.27.17 @ 14:27) >  CONCLUSIONS:  1. Mitral annular calcification, otherwise normal mitral  valve. Moderate-severe mitral regurgitation.  2. Calcified trileaflet aortic valve with decreased  opening. Peak transaortic valve gradient equals 27 mm Hg,  mean transaortic valve gradient equals 20 mm Hg, consistent  with probable mild to moderate aortic stenosis.  3. Severe left ventricular enlargement.  4. Severe segmental left ventricular systolic dysfunction.  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  Thrombus is  visualized within the aneurysm cavity.  Endocardial  visualization enhanced with intravenous injection of echo  contrast (Definity).  5. Unable to accurately evaluate right ventricular size or  systolic function.  *** No previous Echo exam.  ------------------------------------------------------------------------  Confirmed on  9/27/2017 - 15:52:46 by Bright Townsend M.D.  ------------------------------------------------------------------------    < end of copied text >      A/P) He is a pleasant 63 y/o male PMH mod-severe MR associated with a likely non-ischemic cardiomyopathy who was admitted to the hospital with dyspnea. EP is now called for abnormal tele. Review of telemetry shows blocked APCs. There is no evidence of Mobitz II AV block. He denies palpitations nor syncope.     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 If he has prior PAF then he would benefit from systemic A/C-- now on A/C for LV thrombus      Tracey Rosales PA-C  [facility]  [address]   [place] [address]  office [phone]  pager [phone] EP     Tele: NSR, blocked APCs    no palpitations, no syncope, no angina      MEDICATIONS  (STANDING):  influenza   Vaccine 0.5 milliLiter(s) IntraMuscular once  sodium chloride 0.9% lock flush 3 milliLiter(s) IV Push every 8 hours  aspirin enteric coated 81 milliGRAM(s) Oral daily  losartan 100 milliGRAM(s) Oral daily  isosorbide   dinitrate Tablet (ISORDIL) 30 milliGRAM(s) Oral two times a day  ranolazine 500 milliGRAM(s) Oral two times a day  amiodarone    Tablet 200 milliGRAM(s) Oral daily  atorvastatin 40 milliGRAM(s) Oral at bedtime  furosemide    Tablet 40 milliGRAM(s) Oral daily  insulin lispro (HumaLOG) corrective regimen sliding scale   SubCutaneous three times a day before meals  insulin lispro (HumaLOG) corrective regimen sliding scale   SubCutaneous at bedtime  dextrose 5%. 1000 milliLiter(s) (50 mL/Hr) IV Continuous <Continuous>  dextrose 50% Injectable 12.5 Gram(s) IV Push once  dextrose 50% Injectable 25 Gram(s) IV Push once  dextrose 50% Injectable 25 Gram(s) IV Push once  pantoprazole    Tablet 40 milliGRAM(s) Oral before breakfast  senna 2 Tablet(s) Oral at bedtime  enoxaparin Injectable 100 milliGRAM(s) SubCutaneous two times a day  warfarin 5 milliGRAM(s) Oral once    LABS:                        12.0   6.83  )-----------( 218      ( 28 Sep 2017 05:34 )             36.3     Hemoglobin: 12.0 g/dL (09-28 @ 05:34)  Hemoglobin: 12.1 g/dL (09-27 @ 05:55)  Hemoglobin: 11.9 g/dL (09-26 @ 07:10)  Hemoglobin: 12.0 g/dL (09-25 @ 05:45)  Hemoglobin: 11.6 g/dL (09-24 @ 05:42)    141  |  103  |  23  ----------------------------<  200<H>  4.0   |  25  |  1.05    Ca    8.2<L>      28 Sep 2017 05:34  Mg     1.9     09-27    Creatinine Trend: 1.05<--, 1.14<--, 1.06<--, 1.01<--, 0.94<--, 1.00<--     CARDIAC MARKERS ( 26 Sep 2017 18:05 )  x     / < 0.06 ng/mL / x     / x     / x        PHYSICAL EXAM  Vital Signs Last 24 Hrs  T(C): 36.4 (28 Sep 2017 05:04), Max: 36.7 (27 Sep 2017 19:51)  T(F): 97.5 (28 Sep 2017 05:04), Max: 98 (27 Sep 2017 19:51)  HR: 56 (28 Sep 2017 05:04) (56 - 79)  BP: 124/80 (28 Sep 2017 05:04) (124/80 - 133/77)  RR: 18 (28 Sep 2017 05:04) (18 - 18)  SpO2: 99% (28 Sep 2017 05:04) (98% - 99%)    no JVD  RRR, no murmurs  CTAB  soft nt/nd  no c/c/e    DIAGNOSTIC DATA:    < from: TTE with Doppler (w/Cont) (09.27.17 @ 14:27) >  CONCLUSIONS:  1. Mitral annular calcification, otherwise normal mitral  valve. Moderate-severe mitral regurgitation.  2. Calcified trileaflet aortic valve with decreased  opening. Peak transaortic valve gradient equals 27 mm Hg,  mean transaortic valve gradient equals 20 mm Hg, consistent  with probable mild to moderate aortic stenosis.  3. Severe left ventricular enlargement.  4. Severe segmental left ventricular systolic dysfunction.  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  Thrombus is  visualized within the aneurysm cavity.  Endocardial  visualization enhanced with intravenous injection of echo  contrast (Definity).  5. Unable to accurately evaluate right ventricular size or  systolic function.  *** No previous Echo exam.  ------------------------------------------------------------------------  Confirmed on  9/27/2017 - 15:52:46 by Bright Townsend M.D.  ------------------------------------------------------------------------    < end of copied text >      A/P) He is a pleasant 61 y/o male PMH mod-severe MR associated with a likely non-ischemic cardiomyopathy who was admitted to the hospital with dyspnea. EP is now called for abnormal tele. Review of telemetry shows blocked APCs. There is no evidence of Mobitz II AV block. He denies palpitations nor syncope.     -no indications for PPM at this time  -echo noted above-- severe segmental LV dysfxn, mod -sev MR, LV thrombus in aneurysm cavity  -started on full dose Lovenox bridge and Coumadin, Goal INR 2-3 per Cardiology  -would change amiodarone to beta blockers given severe LV dysfxn      Tracey Rosales PA-C  [facility]  [address], [address]   [place] [address]  office [phone]  pager [phone]

## 2017-09-29 LAB
BACTERIA BLD CULT: SIGNIFICANT CHANGE UP
BACTERIA BLD CULT: SIGNIFICANT CHANGE UP
BUN SERPL-MCNC: 23 MG/DL — SIGNIFICANT CHANGE UP (ref 7–23)
CALCIUM SERPL-MCNC: 9 MG/DL — SIGNIFICANT CHANGE UP (ref 8.4–10.5)
CHLORIDE SERPL-SCNC: 102 MMOL/L — SIGNIFICANT CHANGE UP (ref 98–107)
CO2 SERPL-SCNC: 24 MMOL/L — SIGNIFICANT CHANGE UP (ref 22–31)
CREAT SERPL-MCNC: 0.93 MG/DL — SIGNIFICANT CHANGE UP (ref 0.5–1.3)
GLUCOSE SERPL-MCNC: 207 MG/DL — HIGH (ref 70–99)
HCT VFR BLD CALC: 40.9 % — SIGNIFICANT CHANGE UP (ref 39–50)
HGB BLD-MCNC: 13.4 G/DL — SIGNIFICANT CHANGE UP (ref 13–17)
INR BLD: 1.08 — SIGNIFICANT CHANGE UP (ref 0.88–1.17)
MCHC RBC-ENTMCNC: 29.1 PG — SIGNIFICANT CHANGE UP (ref 27–34)
MCHC RBC-ENTMCNC: 32.8 % — SIGNIFICANT CHANGE UP (ref 32–36)
MCV RBC AUTO: 88.9 FL — SIGNIFICANT CHANGE UP (ref 80–100)
NRBC # FLD: 0 — SIGNIFICANT CHANGE UP
PLATELET # BLD AUTO: 230 K/UL — SIGNIFICANT CHANGE UP (ref 150–400)
PMV BLD: 11.2 FL — SIGNIFICANT CHANGE UP (ref 7–13)
POTASSIUM SERPL-MCNC: 4.5 MMOL/L — SIGNIFICANT CHANGE UP (ref 3.5–5.3)
POTASSIUM SERPL-SCNC: 4.5 MMOL/L — SIGNIFICANT CHANGE UP (ref 3.5–5.3)
PROTHROM AB SERPL-ACNC: 12.1 SEC — SIGNIFICANT CHANGE UP (ref 9.8–13.1)
RBC # BLD: 4.6 M/UL — SIGNIFICANT CHANGE UP (ref 4.2–5.8)
RBC # FLD: 12.5 % — SIGNIFICANT CHANGE UP (ref 10.3–14.5)
SODIUM SERPL-SCNC: 141 MMOL/L — SIGNIFICANT CHANGE UP (ref 135–145)
WBC # BLD: 7.29 K/UL — SIGNIFICANT CHANGE UP (ref 3.8–10.5)
WBC # FLD AUTO: 7.29 K/UL — SIGNIFICANT CHANGE UP (ref 3.8–10.5)

## 2017-09-29 RX ORDER — WARFARIN SODIUM 2.5 MG/1
10 TABLET ORAL ONCE
Qty: 0 | Refills: 0 | Status: COMPLETED | OUTPATIENT
Start: 2017-09-29 | End: 2017-09-29

## 2017-09-29 RX ADMIN — Medication 25 MILLIGRAM(S): at 05:57

## 2017-09-29 RX ADMIN — LOSARTAN POTASSIUM 100 MILLIGRAM(S): 100 TABLET, FILM COATED ORAL at 05:58

## 2017-09-29 RX ADMIN — RANOLAZINE 500 MILLIGRAM(S): 500 TABLET, FILM COATED, EXTENDED RELEASE ORAL at 17:46

## 2017-09-29 RX ADMIN — RANOLAZINE 500 MILLIGRAM(S): 500 TABLET, FILM COATED, EXTENDED RELEASE ORAL at 05:57

## 2017-09-29 RX ADMIN — ENOXAPARIN SODIUM 100 MILLIGRAM(S): 100 INJECTION SUBCUTANEOUS at 05:57

## 2017-09-29 RX ADMIN — Medication: at 13:42

## 2017-09-29 RX ADMIN — Medication 81 MILLIGRAM(S): at 11:34

## 2017-09-29 RX ADMIN — ISOSORBIDE DINITRATE 30 MILLIGRAM(S): 5 TABLET ORAL at 17:46

## 2017-09-29 RX ADMIN — Medication: at 17:46

## 2017-09-29 RX ADMIN — SODIUM CHLORIDE 3 MILLILITER(S): 9 INJECTION INTRAMUSCULAR; INTRAVENOUS; SUBCUTANEOUS at 13:43

## 2017-09-29 RX ADMIN — ENOXAPARIN SODIUM 100 MILLIGRAM(S): 100 INJECTION SUBCUTANEOUS at 17:46

## 2017-09-29 RX ADMIN — Medication 40 MILLIGRAM(S): at 05:58

## 2017-09-29 RX ADMIN — SENNA PLUS 2 TABLET(S): 8.6 TABLET ORAL at 21:51

## 2017-09-29 RX ADMIN — SODIUM CHLORIDE 3 MILLILITER(S): 9 INJECTION INTRAMUSCULAR; INTRAVENOUS; SUBCUTANEOUS at 05:58

## 2017-09-29 RX ADMIN — PANTOPRAZOLE SODIUM 40 MILLIGRAM(S): 20 TABLET, DELAYED RELEASE ORAL at 05:58

## 2017-09-29 RX ADMIN — WARFARIN SODIUM 10 MILLIGRAM(S): 2.5 TABLET ORAL at 17:46

## 2017-09-29 RX ADMIN — ATORVASTATIN CALCIUM 40 MILLIGRAM(S): 80 TABLET, FILM COATED ORAL at 21:51

## 2017-09-29 RX ADMIN — Medication: at 10:26

## 2017-09-29 RX ADMIN — SODIUM CHLORIDE 3 MILLILITER(S): 9 INJECTION INTRAMUSCULAR; INTRAVENOUS; SUBCUTANEOUS at 21:39

## 2017-09-29 RX ADMIN — ISOSORBIDE DINITRATE 30 MILLIGRAM(S): 5 TABLET ORAL at 05:58

## 2017-09-29 NOTE — PROGRESS NOTE ADULT - SUBJECTIVE AND OBJECTIVE BOX
EP ATTENDING    tele: NSR, no events    no palpitations, no syncope, no angina    influenza   Vaccine 0.5 milliLiter(s) IntraMuscular once  sodium chloride 0.9% lock flush 3 milliLiter(s) IV Push every 8 hours  aspirin enteric coated 81 milliGRAM(s) Oral daily  losartan 100 milliGRAM(s) Oral daily  isosorbide   dinitrate Tablet (ISORDIL) 30 milliGRAM(s) Oral two times a day  ranolazine 500 milliGRAM(s) Oral two times a day  atorvastatin 40 milliGRAM(s) Oral at bedtime  furosemide    Tablet 40 milliGRAM(s) Oral daily  insulin lispro (HumaLOG) corrective regimen sliding scale   SubCutaneous three times a day before meals  insulin lispro (HumaLOG) corrective regimen sliding scale   SubCutaneous at bedtime  dextrose 5%. 1000 milliLiter(s) IV Continuous <Continuous>  dextrose Gel 1 Dose(s) Oral once PRN  dextrose 50% Injectable 12.5 Gram(s) IV Push once  dextrose 50% Injectable 25 Gram(s) IV Push once  dextrose 50% Injectable 25 Gram(s) IV Push once  glucagon  Injectable 1 milliGRAM(s) IntraMuscular once PRN  acetaminophen   Tablet. 650 milliGRAM(s) Oral every 6 hours PRN  pantoprazole    Tablet 40 milliGRAM(s) Oral before breakfast  senna 2 Tablet(s) Oral at bedtime  enoxaparin Injectable 100 milliGRAM(s) SubCutaneous two times a day  metoprolol succinate ER 25 milliGRAM(s) Oral daily  warfarin 10 milliGRAM(s) Oral once                            13.4   7.29  )-----------( 230      ( 29 Sep 2017 06:25 )             40.9       09-29    141  |  102  |  23  ----------------------------<  207<H>  4.5   |  24  |  0.93    Ca    9.0      29 Sep 2017 06:25        CARDIAC MARKERS ( 26 Sep 2017 18:05 )  x     / < 0.06 ng/mL / x     / x     / x            T(C): 36.9 (09-29-17 @ 05:56), Max: 37.1 (09-28-17 @ 15:18)  HR: 72 (09-29-17 @ 05:56) (64 - 72)  BP: 153/89 (09-29-17 @ 05:56) (127/78 - 153/89)  RR: 18 (09-29-17 @ 05:56) (18 - 18)  SpO2: 98% (09-29-17 @ 05:56) (95% - 98%)  Wt(kg): --    no JVD  RRR, no murmurs  CTAB  soft nt/nd  no c/c/e    A/P) He is a pleasant 61 y/o male PMH mod-severe MR associated with a likely non-ischemic cardiomyopathy who was admitted to the hospital with dyspnea. EP is now called for abnormal tele. Review of telemetry shows blocked APCs. There is no evidence of Mobitz II AV block. He denies palpitations nor syncope.  Echo shows severe segmental LV dysfunction, IW aneurysm with thrombus, and moderate-severe MR.    -no indications for PPM at this time  -continue toprol and losartan  -no more amiodarone  -repeat echo in 3 months to determine candidacy for dual chamber ICD  -no further inpatient EP workup needed  -d/c planning, lifelong anticoagulation for LV thrombus

## 2017-09-29 NOTE — PROGRESS NOTE ADULT - SUBJECTIVE AND OBJECTIVE BOX
Patient is a 62y old  Male who presents with a chief complaint of Chest pain (24 Sep 2017 00:08)      SUBJECTIVE / OVERNIGHT EVENTS:   Feels better.  Denies CP/SOB/Palpitation/HA.    MEDICATIONS  (STANDING):  influenza   Vaccine 0.5 milliLiter(s) IntraMuscular once  sodium chloride 0.9% lock flush 3 milliLiter(s) IV Push every 8 hours  aspirin enteric coated 81 milliGRAM(s) Oral daily  losartan 100 milliGRAM(s) Oral daily  isosorbide   dinitrate Tablet (ISORDIL) 30 milliGRAM(s) Oral two times a day  ranolazine 500 milliGRAM(s) Oral two times a day  atorvastatin 40 milliGRAM(s) Oral at bedtime  furosemide    Tablet 40 milliGRAM(s) Oral daily  insulin lispro (HumaLOG) corrective regimen sliding scale   SubCutaneous three times a day before meals  insulin lispro (HumaLOG) corrective regimen sliding scale   SubCutaneous at bedtime  dextrose 5%. 1000 milliLiter(s) (50 mL/Hr) IV Continuous <Continuous>  dextrose 50% Injectable 12.5 Gram(s) IV Push once  dextrose 50% Injectable 25 Gram(s) IV Push once  dextrose 50% Injectable 25 Gram(s) IV Push once  pantoprazole    Tablet 40 milliGRAM(s) Oral before breakfast  senna 2 Tablet(s) Oral at bedtime  enoxaparin Injectable 100 milliGRAM(s) SubCutaneous two times a day  metoprolol succinate ER 25 milliGRAM(s) Oral daily    MEDICATIONS  (PRN):  dextrose Gel 1 Dose(s) Oral once PRN Blood Glucose LESS THAN 70 milliGRAM(s)/deciliter  glucagon  Injectable 1 milliGRAM(s) IntraMuscular once PRN Glucose LESS THAN 70 milligrams/deciliter  acetaminophen   Tablet. 650 milliGRAM(s) Oral every 6 hours PRN Severe Pain (7 - 10)        CAPILLARY BLOOD GLUCOSE  237 (29 Sep 2017 22:43)  218 (29 Sep 2017 17:45)  275 (29 Sep 2017 12:10)  204 (29 Sep 2017 08:36)        I&O's Summary      PHYSICAL EXAM:  GENERAL: NAD, well-developed  HEAD:  Atraumatic, Normocephalic  EYES: conjunctiva and sclera clear  NECK: Supple, No JVD  CHEST/LUNG: Clear to auscultation bilaterally; No wheeze  HEART: Regular rate and rhythm; No murmurs, rubs, or gallops  ABDOMEN: Soft, Nontender, Nondistended; Bowel sounds present  EXTREMITIES:  2+ Peripheral Pulses, No clubbing, cyanosis, or edema  NEUROLOGY: AAO X 3  SKIN: No rashes    LABS:                        13.4   7.29  )-----------( 230      ( 29 Sep 2017 06:25 )             40.9     -    141  |  102  |  23  ----------------------------<  207<H>  4.5   |  24  |  0.93    Ca    9.0      29 Sep 2017 06:25      PT/INR - ( 29 Sep 2017 06:25 )   PT: 12.1 SEC;   INR: 1.08                Urinalysis Basic - ( 28 Sep 2017 17:15 )    Color: YELLOW / Appearance: CLEAR / S.015 / pH: 6.0  Gluc: 500 / Ketone: NEGATIVE  / Bili: NEGATIVE / Urobili: 1 E.U.   Blood: NEGATIVE / Protein: NEGATIVE / Nitrite: NEGATIVE   Leuk Esterase: NEGATIVE / RBC: 0-2 / WBC 0-2   Sq Epi: OCC / Non Sq Epi: x / Bacteria: FEW      CAPILLARY BLOOD GLUCOSE  237 (29 Sep 2017 22:43)  218 (29 Sep 2017 17:45)  275 (29 Sep 2017 12:10)  204 (29 Sep 2017 08:36)                    RADIOLOGY & ADDITIONAL TESTS:    Imaging Personally Reviewed:    Consultant(s) Notes Reviewed:      Care Discussed with Consultants/Other Providers:

## 2017-09-29 NOTE — PROGRESS NOTE ADULT - SUBJECTIVE AND OBJECTIVE BOX
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  Thrombus is visualized within the aneurysm cavity.Started on Anticoagulation-Discussed plan with Patients daughter.             MEDICATIONS  (STANDING):  influenza   Vaccine 0.5 milliLiter(s) IntraMuscular once  sodium chloride 0.9% lock flush 3 milliLiter(s) IV Push every 8 hours  aspirin enteric coated 81 milliGRAM(s) Oral daily  losartan 100 milliGRAM(s) Oral daily  isosorbide   dinitrate Tablet (ISORDIL) 30 milliGRAM(s) Oral two times a day  ranolazine 500 milliGRAM(s) Oral two times a day  atorvastatin 40 milliGRAM(s) Oral at bedtime  furosemide    Tablet 40 milliGRAM(s) Oral daily  insulin lispro (HumaLOG) corrective regimen sliding scale   SubCutaneous three times a day before meals  insulin lispro (HumaLOG) corrective regimen sliding scale   SubCutaneous at bedtime  pantoprazole    Tablet 40 milliGRAM(s) Oral before breakfast  senna 2 Tablet(s) Oral at bedtime  enoxaparin Injectable 100 milliGRAM(s) SubCutaneous two times a day  metoprolol succinate ER 25 milliGRAM(s) Oral daily    MEDICATIONS  (PRN):  dextrose Gel 1 Dose(s) Oral once PRN Blood Glucose LESS THAN 70 milliGRAM(s)/deciliter  glucagon  Injectable 1 milliGRAM(s) IntraMuscular once PRN Glucose LESS THAN 70 milligrams/deciliter  acetaminophen   Tablet. 650 milliGRAM(s) Oral every 6 hours PRN Severe Pain (7 - 10)          FAMILY HISTORY:  No pertinent family history in first degree relatives  No family history of premature coronary artery disease or sudden cardiac death      REVIEW OF SYSTEMS:  Constitutional: [ ] fever, [ ]weight loss,  [x ]fatigue  Eyes: [ ] visual changes  Respiratory: [x ]shortness of breath;  [ ] cough, [ ]wheezing, [ ]chills, [ ]hemoptysis  Cardiovascular: [x] chest pain, [ ]palpitations, [x ]dizziness,  [x ]leg swelling  Gastrointestinal: [ ] abdominal pain, [ ]nausea, [ ]vomiting,  [ ]diarrhea   Genitourinary: [ ] dysuria, [ ] hematuria  Neurologic: [ ] headaches [ ] tremors  Skin: [ ] itching, [ ]burning, [ ] rashes  Endocrine: [ ] heat or cold intolerance  Musculoskeletal: [ ] joint pain or swelling; [ ] muscle, back, or extremity pain  Psychiatric: [ ] depression, [ ]anxiety, [ ]mood swings, or [ ]difficulty sleeping  Hematologic: [ ] easy bruising, [ ] bleeding gums    [x] All remaining systems negative except as per above.     Vital Signs Last 24 Hrs  T(C): 36.9 (29 Sep 2017 05:56), Max: 37.1 (28 Sep 2017 15:18)  T(F): 98.5 (29 Sep 2017 05:56), Max: 98.7 (28 Sep 2017 15:18)  HR: 72 (29 Sep 2017 05:56) (64 - 72)  BP: 153/89 (29 Sep 2017 05:56) (127/78 - 153/89)  RR: 18 (29 Sep 2017 05:56) (18 - 18)  SpO2: 98% (29 Sep 2017 05:56) (95% - 98%)  I&O's Summary      PHYSICAL EXAM:  General: No acute distress  HEENT: EOMI, PERRL  Neck: Supple, No JVD  Lungs: Clear to percussion bilaterally; No rales or wheezing  Heart: Regular rate and rhythm; 2/6 systolic murmur,no  rubs, or gallops  Abdomen: Nontender, bowel sounds present  Extremities: No clubbing, cyanosis, trace edema  Nervous system:  Alert & Oriented X3, no focal deficits  Psychiatric: Normal affect  Skin: No rashes or lesions    LABS:  09-29    141  |  102  |  23  ----------------------------<  207<H>  4.5   |  24  |  0.93    Ca    9.0      29 Sep 2017 06:25      Creatinine Trend: 0.93<--, 1.05<--, 1.14<--, 1.06<--, 1.01<--, 0.94<--                        13.4   7.29  )-----------( 230      ( 29 Sep 2017 06:25 )             40.9     PT/INR - ( 29 Sep 2017 06:25 )   PT: 12.1 SEC;   INR: 1.08              IMPRESSION AND PLAN:  61 y/o M with hx of "heart problems" HTN, HLD, DM, presents with Midsternal chest pain which radiates to the epigastric region x 1 day.  admitted to r/o ACS   Problem/Plan - 1:  ·  Problem: Chest pain.  Plan: Admit to tele. Cath -RCA with LV aneurysm.LV systolic dysfunction with moderate MR,AS and thrombus in LV aneurysm.Start coumadin.  EP follow up   Problem/Plan - 3:  ·  Problem: Diabetes mellitus, type 2.  Plan: ISS  Monitor fingersticks  not on home meds.   check a1c.     Problem/Plan - 4:  ·  Problem: Hypertension.  Plan: cont isosorbide, losartan and lasix.     Problem/Plan - 5:  .  Problem: Chronic Systolic Heart [id number]-[id number]%.  Plan: Clinically Euvolemic is on Metoprolol,Losartan,add Spironolactone      Possible discharge tomorrow.

## 2017-09-30 LAB
APTT BLD: 48 SEC — HIGH (ref 27.5–37.4)
BUN SERPL-MCNC: 29 MG/DL — HIGH (ref 7–23)
CALCIUM SERPL-MCNC: 9.3 MG/DL — SIGNIFICANT CHANGE UP (ref 8.4–10.5)
CHLORIDE SERPL-SCNC: 102 MMOL/L — SIGNIFICANT CHANGE UP (ref 98–107)
CO2 SERPL-SCNC: 25 MMOL/L — SIGNIFICANT CHANGE UP (ref 22–31)
CREAT SERPL-MCNC: 1.22 MG/DL — SIGNIFICANT CHANGE UP (ref 0.5–1.3)
GLUCOSE SERPL-MCNC: 227 MG/DL — HIGH (ref 70–99)
HCT VFR BLD CALC: 37 % — LOW (ref 39–50)
HGB BLD-MCNC: 12.5 G/DL — LOW (ref 13–17)
INR BLD: 1.36 — HIGH (ref 0.88–1.17)
MAGNESIUM SERPL-MCNC: 2.9 MG/DL — HIGH (ref 1.6–2.6)
MCHC RBC-ENTMCNC: 29.9 PG — SIGNIFICANT CHANGE UP (ref 27–34)
MCHC RBC-ENTMCNC: 33.8 % — SIGNIFICANT CHANGE UP (ref 32–36)
MCV RBC AUTO: 88.5 FL — SIGNIFICANT CHANGE UP (ref 80–100)
NRBC # FLD: 0 — SIGNIFICANT CHANGE UP
PHOSPHATE SERPL-MCNC: 2.8 MG/DL — SIGNIFICANT CHANGE UP (ref 2.5–4.5)
PLATELET # BLD AUTO: 228 K/UL — SIGNIFICANT CHANGE UP (ref 150–400)
PMV BLD: 11 FL — SIGNIFICANT CHANGE UP (ref 7–13)
POTASSIUM SERPL-MCNC: 4.5 MMOL/L — SIGNIFICANT CHANGE UP (ref 3.5–5.3)
POTASSIUM SERPL-SCNC: 4.5 MMOL/L — SIGNIFICANT CHANGE UP (ref 3.5–5.3)
PROTHROM AB SERPL-ACNC: 15.3 SEC — HIGH (ref 9.8–13.1)
RBC # BLD: 4.18 M/UL — LOW (ref 4.2–5.8)
RBC # FLD: 12.9 % — SIGNIFICANT CHANGE UP (ref 10.3–14.5)
SODIUM SERPL-SCNC: 141 MMOL/L — SIGNIFICANT CHANGE UP (ref 135–145)
WBC # BLD: 8.87 K/UL — SIGNIFICANT CHANGE UP (ref 3.8–10.5)
WBC # FLD AUTO: 8.87 K/UL — SIGNIFICANT CHANGE UP (ref 3.8–10.5)

## 2017-09-30 RX ORDER — WARFARIN SODIUM 2.5 MG/1
10 TABLET ORAL ONCE
Qty: 0 | Refills: 0 | Status: COMPLETED | OUTPATIENT
Start: 2017-09-30 | End: 2017-09-30

## 2017-09-30 RX ADMIN — ENOXAPARIN SODIUM 100 MILLIGRAM(S): 100 INJECTION SUBCUTANEOUS at 17:42

## 2017-09-30 RX ADMIN — PANTOPRAZOLE SODIUM 40 MILLIGRAM(S): 20 TABLET, DELAYED RELEASE ORAL at 05:21

## 2017-09-30 RX ADMIN — Medication 25 MILLIGRAM(S): at 05:21

## 2017-09-30 RX ADMIN — WARFARIN SODIUM 10 MILLIGRAM(S): 2.5 TABLET ORAL at 17:42

## 2017-09-30 RX ADMIN — ATORVASTATIN CALCIUM 40 MILLIGRAM(S): 80 TABLET, FILM COATED ORAL at 21:26

## 2017-09-30 RX ADMIN — Medication 40 MILLIGRAM(S): at 05:21

## 2017-09-30 RX ADMIN — SODIUM CHLORIDE 3 MILLILITER(S): 9 INJECTION INTRAMUSCULAR; INTRAVENOUS; SUBCUTANEOUS at 05:13

## 2017-09-30 RX ADMIN — Medication 2: at 13:18

## 2017-09-30 RX ADMIN — SENNA PLUS 2 TABLET(S): 8.6 TABLET ORAL at 21:26

## 2017-09-30 RX ADMIN — SODIUM CHLORIDE 3 MILLILITER(S): 9 INJECTION INTRAMUSCULAR; INTRAVENOUS; SUBCUTANEOUS at 21:26

## 2017-09-30 RX ADMIN — RANOLAZINE 500 MILLIGRAM(S): 500 TABLET, FILM COATED, EXTENDED RELEASE ORAL at 17:52

## 2017-09-30 RX ADMIN — ENOXAPARIN SODIUM 100 MILLIGRAM(S): 100 INJECTION SUBCUTANEOUS at 05:22

## 2017-09-30 RX ADMIN — Medication 1: at 17:55

## 2017-09-30 RX ADMIN — SODIUM CHLORIDE 3 MILLILITER(S): 9 INJECTION INTRAMUSCULAR; INTRAVENOUS; SUBCUTANEOUS at 12:20

## 2017-09-30 RX ADMIN — Medication 81 MILLIGRAM(S): at 15:37

## 2017-09-30 RX ADMIN — RANOLAZINE 500 MILLIGRAM(S): 500 TABLET, FILM COATED, EXTENDED RELEASE ORAL at 05:21

## 2017-09-30 RX ADMIN — LOSARTAN POTASSIUM 100 MILLIGRAM(S): 100 TABLET, FILM COATED ORAL at 05:21

## 2017-09-30 RX ADMIN — ISOSORBIDE DINITRATE 30 MILLIGRAM(S): 5 TABLET ORAL at 05:21

## 2017-09-30 RX ADMIN — Medication 2: at 09:25

## 2017-09-30 RX ADMIN — ISOSORBIDE DINITRATE 30 MILLIGRAM(S): 5 TABLET ORAL at 17:42

## 2017-09-30 NOTE — PROGRESS NOTE ADULT - SUBJECTIVE AND OBJECTIVE BOX
Patient is a 62y old  Male who presents with a chief complaint of Chest pain (24 Sep 2017 00:08)      SUBJECTIVE / OVERNIGHT EVENTS:   Feels better.  Denies CP/SOB/Palpitation/HA.    MEDICATIONS  (STANDING):  influenza   Vaccine 0.5 milliLiter(s) IntraMuscular once  sodium chloride 0.9% lock flush 3 milliLiter(s) IV Push every 8 hours  aspirin enteric coated 81 milliGRAM(s) Oral daily  losartan 100 milliGRAM(s) Oral daily  isosorbide   dinitrate Tablet (ISORDIL) 30 milliGRAM(s) Oral two times a day  ranolazine 500 milliGRAM(s) Oral two times a day  atorvastatin 40 milliGRAM(s) Oral at bedtime  furosemide    Tablet 40 milliGRAM(s) Oral daily  insulin lispro (HumaLOG) corrective regimen sliding scale   SubCutaneous three times a day before meals  insulin lispro (HumaLOG) corrective regimen sliding scale   SubCutaneous at bedtime  dextrose 5%. 1000 milliLiter(s) (50 mL/Hr) IV Continuous <Continuous>  dextrose 50% Injectable 12.5 Gram(s) IV Push once  dextrose 50% Injectable 25 Gram(s) IV Push once  dextrose 50% Injectable 25 Gram(s) IV Push once  pantoprazole    Tablet 40 milliGRAM(s) Oral before breakfast  senna 2 Tablet(s) Oral at bedtime  enoxaparin Injectable 100 milliGRAM(s) SubCutaneous two times a day  metoprolol succinate ER 25 milliGRAM(s) Oral daily    MEDICATIONS  (PRN):  dextrose Gel 1 Dose(s) Oral once PRN Blood Glucose LESS THAN 70 milliGRAM(s)/deciliter  glucagon  Injectable 1 milliGRAM(s) IntraMuscular once PRN Glucose LESS THAN 70 milligrams/deciliter  acetaminophen   Tablet. 650 milliGRAM(s) Oral every 6 hours PRN Severe Pain (7 - 10)        CAPILLARY BLOOD GLUCOSE  197 (30 Sep 2017 17:29)  226 (30 Sep 2017 12:57)  201 (30 Sep 2017 09:22)  237 (29 Sep 2017 22:43)        I&O's Summary    30 Sep 2017 07:01  -  30 Sep 2017 20:35  --------------------------------------------------------  IN: 850 mL / OUT: 600 mL / NET: 250 mL        PHYSICAL EXAM:  GENERAL: NAD, well-developed  HEAD:  Atraumatic, Normocephalic  EYES: conjunctiva and sclera clear  NECK: Supple, No JVD  CHEST/LUNG: Clear to auscultation bilaterally; No wheeze  HEART: Regular rate and rhythm; No murmurs, rubs, or gallops  ABDOMEN: Soft, Nontender, Nondistended; Bowel sounds present  EXTREMITIES:  2+ Peripheral Pulses, No clubbing, cyanosis, or edema  NEUROLOGY: AAO X 3  SKIN: No rashes    LABS:                        12.5   8.87  )-----------( 228      ( 30 Sep 2017 07:32 )             37.0     09-30    141  |  102  |  29<H>  ----------------------------<  227<H>  4.5   |  25  |  1.22    Ca    9.3      30 Sep 2017 07:32  Phos  2.8     09-30  Mg     2.9     09-30      PT/INR - ( 30 Sep 2017 07:32 )   PT: 15.3 SEC;   INR: 1.36          PTT - ( 30 Sep 2017 07:32 )  PTT:48.0 SEC        CAPILLARY BLOOD GLUCOSE  197 (30 Sep 2017 17:29)  226 (30 Sep 2017 12:57)  201 (30 Sep 2017 09:22)  237 (29 Sep 2017 22:43)                    RADIOLOGY & ADDITIONAL TESTS:    Imaging Personally Reviewed:    Consultant(s) Notes Reviewed:      Care Discussed with Consultants/Other Providers:

## 2017-10-01 ENCOUNTER — TRANSCRIPTION ENCOUNTER (OUTPATIENT)
Age: 62
End: 2017-10-01

## 2017-10-01 VITALS
HEART RATE: 66 BPM | SYSTOLIC BLOOD PRESSURE: 133 MMHG | OXYGEN SATURATION: 99 % | RESPIRATION RATE: 16 BRPM | TEMPERATURE: 99 F | DIASTOLIC BLOOD PRESSURE: 74 MMHG

## 2017-10-01 LAB
APTT BLD: 47.6 SEC — HIGH (ref 27.5–37.4)
BUN SERPL-MCNC: 34 MG/DL — HIGH (ref 7–23)
CALCIUM SERPL-MCNC: 9.2 MG/DL — SIGNIFICANT CHANGE UP (ref 8.4–10.5)
CHLORIDE SERPL-SCNC: 101 MMOL/L — SIGNIFICANT CHANGE UP (ref 98–107)
CO2 SERPL-SCNC: 27 MMOL/L — SIGNIFICANT CHANGE UP (ref 22–31)
CREAT SERPL-MCNC: 1.05 MG/DL — SIGNIFICANT CHANGE UP (ref 0.5–1.3)
GLUCOSE SERPL-MCNC: 190 MG/DL — HIGH (ref 70–99)
HCT VFR BLD CALC: 38.1 % — LOW (ref 39–50)
HGB BLD-MCNC: 12.7 G/DL — LOW (ref 13–17)
INR BLD: 2.97 — HIGH (ref 0.88–1.17)
MAGNESIUM SERPL-MCNC: 1.8 MG/DL — SIGNIFICANT CHANGE UP (ref 1.6–2.6)
MCHC RBC-ENTMCNC: 29.7 PG — SIGNIFICANT CHANGE UP (ref 27–34)
MCHC RBC-ENTMCNC: 33.3 % — SIGNIFICANT CHANGE UP (ref 32–36)
MCV RBC AUTO: 89 FL — SIGNIFICANT CHANGE UP (ref 80–100)
NRBC # FLD: 0 — SIGNIFICANT CHANGE UP
PLATELET # BLD AUTO: 235 K/UL — SIGNIFICANT CHANGE UP (ref 150–400)
PMV BLD: 11.2 FL — SIGNIFICANT CHANGE UP (ref 7–13)
POTASSIUM SERPL-MCNC: 4.3 MMOL/L — SIGNIFICANT CHANGE UP (ref 3.5–5.3)
POTASSIUM SERPL-SCNC: 4.3 MMOL/L — SIGNIFICANT CHANGE UP (ref 3.5–5.3)
PROTHROM AB SERPL-ACNC: 34 SEC — HIGH (ref 9.8–13.1)
RBC # BLD: 4.28 M/UL — SIGNIFICANT CHANGE UP (ref 4.2–5.8)
RBC # FLD: 12.7 % — SIGNIFICANT CHANGE UP (ref 10.3–14.5)
SODIUM SERPL-SCNC: 143 MMOL/L — SIGNIFICANT CHANGE UP (ref 135–145)
WBC # BLD: 7.24 K/UL — SIGNIFICANT CHANGE UP (ref 3.8–10.5)
WBC # FLD AUTO: 7.24 K/UL — SIGNIFICANT CHANGE UP (ref 3.8–10.5)

## 2017-10-01 RX ORDER — SPIRONOLACTONE 25 MG/1
1 TABLET, FILM COATED ORAL
Qty: 30 | Refills: 0
Start: 2017-10-01 | End: 2017-10-31

## 2017-10-01 RX ORDER — AMIODARONE HYDROCHLORIDE 400 MG/1
1 TABLET ORAL
Qty: 0 | Refills: 0 | COMMUNITY

## 2017-10-01 RX ORDER — WARFARIN SODIUM 2.5 MG/1
1 TABLET ORAL
Qty: 30 | Refills: 0
Start: 2017-10-01 | End: 2017-10-31

## 2017-10-01 RX ORDER — METOPROLOL TARTRATE 50 MG
1 TABLET ORAL
Qty: 30 | Refills: 0
Start: 2017-10-01 | End: 2017-10-31

## 2017-10-01 RX ORDER — PANTOPRAZOLE SODIUM 20 MG/1
1 TABLET, DELAYED RELEASE ORAL
Qty: 30 | Refills: 0
Start: 2017-10-01 | End: 2017-10-31

## 2017-10-01 RX ORDER — WARFARIN SODIUM 2.5 MG/1
2 TABLET ORAL ONCE
Qty: 0 | Refills: 0 | Status: COMPLETED | OUTPATIENT
Start: 2017-10-01 | End: 2017-10-01

## 2017-10-01 RX ORDER — SPIRONOLACTONE 25 MG/1
25 TABLET, FILM COATED ORAL DAILY
Qty: 0 | Refills: 0 | Status: DISCONTINUED | OUTPATIENT
Start: 2017-10-01 | End: 2017-10-01

## 2017-10-01 RX ADMIN — INFLUENZA VIRUS VACCINE 0.5 MILLILITER(S): 15; 15; 15; 15 SUSPENSION INTRAMUSCULAR at 13:43

## 2017-10-01 RX ADMIN — Medication 4: at 13:23

## 2017-10-01 RX ADMIN — Medication 2: at 09:19

## 2017-10-01 RX ADMIN — PANTOPRAZOLE SODIUM 40 MILLIGRAM(S): 20 TABLET, DELAYED RELEASE ORAL at 06:22

## 2017-10-01 RX ADMIN — ISOSORBIDE DINITRATE 30 MILLIGRAM(S): 5 TABLET ORAL at 05:04

## 2017-10-01 RX ADMIN — SPIRONOLACTONE 25 MILLIGRAM(S): 25 TABLET, FILM COATED ORAL at 11:25

## 2017-10-01 RX ADMIN — Medication 81 MILLIGRAM(S): at 11:25

## 2017-10-01 RX ADMIN — Medication 40 MILLIGRAM(S): at 05:05

## 2017-10-01 RX ADMIN — SODIUM CHLORIDE 3 MILLILITER(S): 9 INJECTION INTRAMUSCULAR; INTRAVENOUS; SUBCUTANEOUS at 11:21

## 2017-10-01 RX ADMIN — WARFARIN SODIUM 2 MILLIGRAM(S): 2.5 TABLET ORAL at 14:58

## 2017-10-01 RX ADMIN — Medication 25 MILLIGRAM(S): at 05:04

## 2017-10-01 RX ADMIN — ENOXAPARIN SODIUM 100 MILLIGRAM(S): 100 INJECTION SUBCUTANEOUS at 05:03

## 2017-10-01 RX ADMIN — LOSARTAN POTASSIUM 100 MILLIGRAM(S): 100 TABLET, FILM COATED ORAL at 05:05

## 2017-10-01 RX ADMIN — RANOLAZINE 500 MILLIGRAM(S): 500 TABLET, FILM COATED, EXTENDED RELEASE ORAL at 05:04

## 2017-10-01 RX ADMIN — SODIUM CHLORIDE 3 MILLILITER(S): 9 INJECTION INTRAMUSCULAR; INTRAVENOUS; SUBCUTANEOUS at 05:08

## 2017-10-01 NOTE — PROGRESS NOTE ADULT - ATTENDING COMMENTS
Teto Gonzalez MD,FACC.  [address] [facility].  [facility].  [phone]
Teto Gonzalez MD,FACC.  [address] [facility].  [facility].  [phone]
Teto Gonzalez MD,FACC.  [address] [facility].  [facility].  [phone]
EP ATTENDING    Agree with above. Echo shows severe segmental LV dysfunction, IW aneurysm with thrombus, and moderate-severe MR.    -no indications for PPM at this time  -started on full dose Lovenox bridge and Coumadin, Goal INR 2-3 per Cardiology, recommend lifelong A/C  -would change amiodarone to beta blockers given severe LV dysfxn  -repeat echo in 3 months to determine candidacy for ICD  -no further EP workup needed  -d/c amio
Teto Gonzalez MD,FACC.  [address] [facility].  [facility].  [phone]

## 2017-10-01 NOTE — DISCHARGE NOTE ADULT - CARE PLAN
Principal Discharge DX:	CAD (coronary artery disease)  Goal:	Compliance with Meds and Diet Restrictions  Instructions for follow-up, activity and diet:	Follow up with Dr. Gonzalez in 1 week  Secondary Diagnosis:	CHF (congestive heart failure)  Goal:	Compliance with Meds and Diet restrictions  Instructions for follow-up, activity and diet:	Follow up with Dr. Gonzalez in 1 week  Secondary Diagnosis:	Left ventricular thrombus without MI  Goal:	Compliance with Coumadin  Instructions for follow-up, activity and diet:	Follow up with Dr. Gonzalez on Thursday Oct 5, Need To Check INR  Secondary Diagnosis:	Mitral regurgitation  Instructions for follow-up, activity and diet:	Follow up with Dr. Gonzalez in 1 week  Secondary Diagnosis:	Lung nodules  Instructions for follow-up, activity and diet:	will Need Repeat Chest CT in 6 months  Secondary Diagnosis:	Hypertension

## 2017-10-01 NOTE — PROGRESS NOTE ADULT - PROVIDER SPECIALTY LIST ADULT
Cardiology
Electrophysiology
Electrophysiology
Internal Medicine
Electrophysiology
Internal Medicine
Cardiology

## 2017-10-01 NOTE — DISCHARGE NOTE ADULT - HOSPITAL COURSE
61 y/o M with hx of "heart problems" HTN, HLD, DM, presents with Midsternal chest pain which radiates to the epigastric region x 1 day. Chest pain is intermittent and is associated with SOB. This Am patient also was very fatigued than usual Patient states he is able to walk a mile without getting out of breath. He moved from [facility] few months ago and few weeks ago he saw a PMD here who started him on American version of medications he was taking in [facility]. He was then instructed to follow up with a cardiologist. The cardiologist did an echo a week ago, but results are unavailable. Denies fever, chills, cough, falls, LOC, abdominal pain, nausea, vomiting, melena, hematochezia, LE edema, calf tenderness, d ysuria, diarrhea, constipation or melena.   HOSPITAL COURSE:   CEx2 neg  EKG: sinus 76 BPM with 1st deg AV block, Q in II, III    9/24 CXR- 9.5 x 7.2cm density with a peripherally calcified rim overlies the region  of the inferior right heart, best seen on lateral view. Non-specific and  may represent a calcified liver cyst, pericardial cyst, or other etiology. Cross-sectional imaging could be obtained to further  characterize as warranted.    9/25 LHC: LM normal, pLAD 40-50%, mLAD 70-80%, LCx normal, pRCA 100%, posterobasal aneurysm, EF 35%, RRA accessed  Pt was noted with Blocked APCs, Seen by EP:  no indication of PPM  S/P ECHO: 1. Mitral annular calcification, otherwise normal mitral valve. Moderate-severe mitral regurgitation.  2. Calcified trileaflet aortic valve with decreased opening. Peak transaortic valve gradient equals 27 mm Hg, mean transaortic valve gradient equals 20 mm Hg, consistent with probable mild to moderate aortic stenosis.  3. Severe left ventricular enlargement. Severe segmental left ventricular systolic dysfunction. 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  Thrombus is visualized within the aneurysm cavity.  Endocardial visualization enhanced with intravenous injection of echo contrast (Definity).  5. Unable to accurately evaluate right ventricular size or systolic function.  Pt Was started om Coumadin   . 9/25 CT chest :1.  Calcified left ventricular aneurysm measuring 10.0 x 7.7 cm. This  corresponds to finding noted on chest radiograph dated 9/23/2017. 2.  7 mm right upper lobe nodule. Follow-up CT chest in 6 months is  recommended.  INR Is now Therapeutic, Case Discussed with attending, Pt is stable for Discharge.

## 2017-10-01 NOTE — DISCHARGE NOTE ADULT - PLAN OF CARE
Compliance with Meds and Diet Restrictions Follow up with Dr. Gonzalez in 1 week Compliance with Meds and Diet restrictions Compliance with Coumadin Follow up with Dr. Gonzalez on Thursday Oct 5, Need To Check INR will Need Repeat Chest CT in 6 months

## 2017-10-01 NOTE — DISCHARGE NOTE ADULT - CARE PROVIDER_API CALL
Teto Gonzalez (JEANIE), Cardiology  [address] [id number]  Phone: [phone]  Fax: [phone]

## 2017-10-01 NOTE — DISCHARGE NOTE ADULT - PATIENT PORTAL LINK FT
“You can access the Follow[facility] Patient Portal, offered by [facility], by registering with the following website: [email or web address]”

## 2017-10-01 NOTE — DISCHARGE NOTE ADULT - SECONDARY DIAGNOSIS.
CHF (congestive heart failure) Left ventricular thrombus without MI Mitral regurgitation Lung nodules Hypertension

## 2017-10-01 NOTE — DISCHARGE NOTE ADULT - MEDICATION SUMMARY - MEDICATIONS TO TAKE
I will START or STAY ON the medications listed below when I get home from the hospital:    spironolactone 25 mg oral tablet  -- 1 tab(s) by mouth once a day  -- Indication: For CHF (congestive heart failure)    Aspirin Enteric Coated 81 mg oral delayed release tablet  -- 1 tab(s) by mouth once a day  -- Indication: For CAD (coronary artery disease)    losartan 100 mg oral tablet  -- 1 tab(s) by mouth once a day  -- Indication: For CHF (congestive heart failure)    isosorbide dinitrate 30 mg oral tablet  -- 1 tab(s) by mouth 2 times a day  -- Indication: For CAD (coronary artery disease)    Ranexa 500 mg oral tablet, extended release  -- 1 tab(s) by mouth 2 times a day  -- Indication: For CAD (coronary artery disease)    Coumadin 2 mg oral tablet  -- 1 tab(s) by mouth once a day  -- Indication: For LV Thrombus    Lipitor 40 mg oral tablet  -- 1 tab(s) by mouth once a day  -- Indication: For Cholesterol    metoprolol succinate 25 mg oral tablet, extended release  -- 1 tab(s) by mouth once a day  -- Indication: For HTN    Lasix 40 mg oral tablet  -- 1 tab(s) by mouth once a day  -- Indication: For CHF (congestive heart failure)    pantoprazole 40 mg oral delayed release tablet  -- 1 tab(s) by mouth once a day (before a meal)  -- Indication: For GI Prophylaxis

## 2017-10-01 NOTE — PROGRESS NOTE ADULT - SUBJECTIVE AND OBJECTIVE BOX
PRESENTING CC:Dyspnea    SUBJ: 61 y/o M with hx of "heart problems" HTN, HLD, T2DM, presents with Midsternal chest pain which radiates to the epigastric region x 1 day,had cath- RCA with inferior calcified aneurysm,no dyspnea,notes occaisonal chest tightness-non exertional,on coumadin for LV aneurysm.      PMH -reviewed admission note, no change since admission  Heart faliure: acute [ ] chronic [x ] acute or chronic [ ] diastolic [ ] systolic [ ] combied systolic and diastolic[ ]  EUGENIO: ATN[ ] renal medullary necrosis [ ] CKD I [ ]CKDII [ ]CKD III [ ]CKD IV [ ]CKD V [ ]Other pathological lesions [ ]    MEDICATIONS  (STANDING):  influenza   Vaccine 0.5 milliLiter(s) IntraMuscular once  sodium chloride 0.9% lock flush 3 milliLiter(s) IV Push every 8 hours  aspirin enteric coated 81 milliGRAM(s) Oral daily  losartan 100 milliGRAM(s) Oral daily  isosorbide   dinitrate Tablet (ISORDIL) 30 milliGRAM(s) Oral two times a day  ranolazine 500 milliGRAM(s) Oral two times a day  atorvastatin 40 milliGRAM(s) Oral at bedtime  furosemide    Tablet 40 milliGRAM(s) Oral daily  insulin lispro (HumaLOG) corrective regimen sliding scale   SubCutaneous three times a day before meals  insulin lispro (HumaLOG) corrective regimen sliding scale   SubCutaneous at bedtime  pantoprazole    Tablet 40 milliGRAM(s) Oral before breakfast  senna 2 Tablet(s) Oral at bedtime  enoxaparin Injectable 100 milliGRAM(s) SubCutaneous two times a day  metoprolol succinate ER 25 milliGRAM(s) Oral daily    MEDICATIONS  (PRN):  dextrose Gel 1 Dose(s) Oral once PRN Blood Glucose LESS THAN 70 milliGRAM(s)/deciliter  glucagon  Injectable 1 milliGRAM(s) IntraMuscular once PRN Glucose LESS THAN 70 milligrams/deciliter  acetaminophen   Tablet. 650 milliGRAM(s) Oral every 6 hours PRN Severe Pain (7 - 10)          FAMILY HISTORY:  No pertinent family history in first degree relatives  No family history of premature coronary artery disease or sudden cardiac death      Vital Signs Last 24 Hrs  T(C): 36.8 (01 Oct 2017 07:52), Max: 36.9 (30 Sep 2017 13:57)  T(F): 98.2 (01 Oct 2017 07:52), Max: 98.5 (30 Sep 2017 13:57)  HR: 62 (01 Oct 2017 07:52) (62 - 74)  BP: 139/82 (01 Oct 2017 07:52) (128/81 - 152/82)  RR: 16 (01 Oct 2017 07:52) (16 - 18)  SpO2: 98% (01 Oct 2017 07:52) (95% - 100%)  I&O's Summary    30 Sep 2017 07:01  -  01 Oct 2017 07:00  --------------------------------------------------------  IN: 850 mL / OUT: 600 mL / NET: 250 mL        PHYSICAL EXAM:  General: No acute distress  HEENT: EOMI, PERRL  Neck: Supple, No JVD  Lungs: Clear to percussion bilaterally; No rales or wheezing  Heart: Regular rate and rhythm; 2/6 systolic murmur,no  rubs, or gallops  Abdomen: Nontender, bowel sounds present  Extremities: No clubbing, cyanosis, or edema  Nervous system:  Alert & Oriented X3, no focal deficits  Psychiatric: Normal affect  Skin: No rashes or lesions    LABS:  10-01    143  |  101  |  34<H>  ----------------------------<  190<H>  4.3   |  27  |  1.05    Ca    9.2      01 Oct 2017 05:40  Phos  2.8     09-30  Mg     1.8     10-01      Creatinine Trend: 1.05<--, 1.22<--, 0.93<--, 1.05<--, 1.14<--, 1.06<--                        12.7   7.24  )-----------( 235      ( 01 Oct 2017 05:40 )             38.1     PT/INR - ( 01 Oct 2017 05:40 )   PT: 34.0 SEC;   INR: 2.97          PTT - ( 01 Oct 2017 05:40 )  PTT:47.6 SEC    IMPRESSION AND PLAN:    61 y/o M with hx of "heart problems" HTN, HLD, DM, presents with Midsternal chest pain which radiates to the epigastric region x 1 day.  admitted to r/o ACS   Problem/Plan - 1:  ·  Problem: Chest pain.  Plan: Admit to tele. Cath -RCA with LV aneurysm.LV systolic dysfunction with moderate MR,AS and thrombus in LV aneurysm.Started on coumadin.  INR therapeutic-continue on Coumadin 2 mg daily-will follow up in office Thursday for INR check.  Problem/Plan - 3:  ·  Problem: Diabetes mellitus, type 2.  Plan: ISS  Monitor fingersticks  not on home meds.   check a1c.     Problem/Plan - 4:  ·  Problem: Hypertension.  Plan: cont isosorbide, losartan and lasix.     Problem/Plan - 5:  .  Problem: Chronic Systolic Heart Brcuilo-SOlRB-59%.  Plan: Clinically Euvolemic is on Metoprolol,Losartan,add Spironolactone

## 2017-10-01 NOTE — PROGRESS NOTE ADULT - SUBJECTIVE AND OBJECTIVE BOX
Patient is a 62y old  Male who presents with a chief complaint of Chest pain (01 Oct 2017 11:03)      SUBJECTIVE / OVERNIGHT EVENTS:   Feels better.  Denies CP/SOB/Palpitation/HA.    MEDICATIONS  (STANDING):  sodium chloride 0.9% lock flush 3 milliLiter(s) IV Push every 8 hours  aspirin enteric coated 81 milliGRAM(s) Oral daily  losartan 100 milliGRAM(s) Oral daily  isosorbide   dinitrate Tablet (ISORDIL) 30 milliGRAM(s) Oral two times a day  ranolazine 500 milliGRAM(s) Oral two times a day  atorvastatin 40 milliGRAM(s) Oral at bedtime  furosemide    Tablet 40 milliGRAM(s) Oral daily  insulin lispro (HumaLOG) corrective regimen sliding scale   SubCutaneous three times a day before meals  insulin lispro (HumaLOG) corrective regimen sliding scale   SubCutaneous at bedtime  dextrose 5%. 1000 milliLiter(s) (50 mL/Hr) IV Continuous <Continuous>  dextrose 50% Injectable 12.5 Gram(s) IV Push once  dextrose 50% Injectable 25 Gram(s) IV Push once  dextrose 50% Injectable 25 Gram(s) IV Push once  pantoprazole    Tablet 40 milliGRAM(s) Oral before breakfast  senna 2 Tablet(s) Oral at bedtime  metoprolol succinate ER 25 milliGRAM(s) Oral daily  spironolactone 25 milliGRAM(s) Oral daily    MEDICATIONS  (PRN):  dextrose Gel 1 Dose(s) Oral once PRN Blood Glucose LESS THAN 70 milliGRAM(s)/deciliter  glucagon  Injectable 1 milliGRAM(s) IntraMuscular once PRN Glucose LESS THAN 70 milligrams/deciliter  acetaminophen   Tablet. 650 milliGRAM(s) Oral every 6 hours PRN Severe Pain (7 - 10)        CAPILLARY BLOOD GLUCOSE  316 (01 Oct 2017 11:59)  221 (01 Oct 2017 09:06)  207 (30 Sep 2017 22:52)  197 (30 Sep 2017 17:29)        I&O's Summary    30 Sep 2017 07:01  -  01 Oct 2017 07:00  --------------------------------------------------------  IN: 850 mL / OUT: 600 mL / NET: 250 mL        PHYSICAL EXAM:  GENERAL: NAD, well-developed  HEAD:  Atraumatic, Normocephalic  EYES: conjunctiva and sclera clear  NECK: Supple, No JVD  CHEST/LUNG: Clear to auscultation bilaterally; No wheeze  HEART: Regular rate and rhythm; No murmurs, rubs, or gallops  ABDOMEN: Soft, Nontender, Nondistended; Bowel sounds present  EXTREMITIES:  2+ Peripheral Pulses, No clubbing, cyanosis, or edema  NEUROLOGY: AAO X 3  SKIN: No rashes    LABS:                        12.7   7.24  )-----------( 235      ( 01 Oct 2017 05:40 )             38.1     10-01    143  |  101  |  34<H>  ----------------------------<  190<H>  4.3   |  27  |  1.05    Ca    9.2      01 Oct 2017 05:40  Phos  2.8     09-30  Mg     1.8     10-01      PT/INR - ( 01 Oct 2017 05:40 )   PT: 34.0 SEC;   INR: 2.97          PTT - ( 01 Oct 2017 05:40 )  PTT:47.6 SEC        CAPILLARY BLOOD GLUCOSE  316 (01 Oct 2017 11:59)  221 (01 Oct 2017 09:06)  207 (30 Sep 2017 22:52)  197 (30 Sep 2017 17:29)                    RADIOLOGY & ADDITIONAL TESTS:    Imaging Personally Reviewed:    Consultant(s) Notes Reviewed:      Care Discussed with Consultants/Other Providers:

## 2017-10-01 NOTE — PROGRESS NOTE ADULT - ASSESSMENT
Patient is a 62y old  Male who presents with a chief complaint of Chest pain.    Chest pain:   Tele  EP f/up appreciated.  ASA/Amio/Ranexa/Imdur   C. Cath : LHC: LM normal, pLAD 40-50%, mLAD 70-80%, LCx normal, pRCA 100%, posterobasal aneurysm, EF 35%,       Mod severe MR:  Repeat ECHO in 3 months.    HTN:  Losartan/Lasix/    Uncontrolled DM II:  FSSS/     HLD:  Lipitor    Thrombus in aneurysm:  SQ Lovenox and coumadin    Lt ventricular aneurysm:  CT chest in 6 month.    Dw pt's daughter in details. She understands pt's condition and she will do regular follow up with cardio. Repeat ECHO in 3 moths to eval for ICD.
Patient is a 62y old  Male who presents with a chief complaint of Chest pain.    Chest pain:   Tele  EP f/up appreciated.  ASA/Amio/Ranexa/Imdur   C. Cath : LHC: LM normal, pLAD 40-50%, mLAD 70-80%, LCx normal, pRCA 100%, posterobasal aneurysm, EF 35%,       Mod severe MR:  Repeat ECHO in 3 months.    HTN:  Losartan/Lasix/    Uncontrolled DM II:  FSSS/     HLD:  Lipitor    Thrombus in aneurysm:  SQ Lovenox and coumadin    Lt ventricular aneurysm:  CT chest in 6 month.    Dw pt's daughter in details. She understands pt's condition and she will do regular follow up with cardio. Repeat ECHO in 3 moths to eval for ICD.
61 y/o M with hx of "heart problems" HTN, HLD, DM, presents with Midsternal chest pain which radiates to the epigastric region x 1 day.  admitted to r/o ACS   Problem/Plan - 1:  ·  Problem: Chest pain.  Plan: Admit to tele. Cath -RCA with LV aneurysm.LV systolic dysfunction with moderate MR,AS and thrombus in LV aneurysm.Start coumadin.  EP follow up   Problem/Plan - 3:  ·  Problem: Diabetes mellitus, type 2.  Plan: ISS  Monitor fingersticks  not on home meds.   check a1c.     Problem/Plan - 4:  ·  Problem: Hypertension.  Plan: cont isosorbide, losartan and lasix.     Problem/Plan - 5:  ·  Problem: Need for prophylactic measure.  Plan: hep sc.
Patient is a 62y old  Male who presents with a chief complaint of Chest pain.    Chest pain:   Tele  EP f/up appreciated.  ASA/Amio/Ranexa/Imdur   C. Cath : LHC: LM normal, pLAD 40-50%, mLAD 70-80%, LCx normal, pRCA 100%, posterobasal aneurysm, EF 35%,       Mod severe MR:  Repeat ECHO in 3 months.    HTN:  Losartan/Lasix/    Uncontrolled DM II:  FSSS/     HLD:  Lipitor    Thrombus in aneurysm:   coumadin    Lt ventricular aneurysm:  CT chest in 6 month.
Patient is a 62y old  Male who presents with a chief complaint of Chest pain.    Chest pain:   Tele  EP f/up appreciated.  ASA/Amio/Ranexa/Imdur   C. Cath : LHC: LM normal, pLAD 40-50%, mLAD 70-80%, LCx normal, pRCA 100%, posterobasal aneurysm, EF 35%,       Mod severe MR:  Repeat ECHO in 3 months.    HTN:  Losartan/Lasix/    Uncontrolled DM II:  FSSS/     HLD:  Lipitor    Thrombus in aneurysm:  SQ Lovenox and coumadin    Lt ventricular aneurysm:  CT chest in 6 month.
Patient is a 62y old  Male who presents with a chief complaint of Chest pain.    Chest pain:  Tele  Cardio f/up appreciated.  ASA/Amio/Ranexa/Imdur   C. Cath : LHC: LM normal, pLAD 40-50%, mLAD 70-80%, LCx normal, pRCA 100%, posterobasal aneurysm, EF 35%,     HTN:  Losartan/Lasix/    Uncontrolled DM II:  FSSS/     HLD:  Lipitor    APCs on Tele:  EP eval noted.      Lt ventricular aneurysm:  CT chest in 6 month.
Patient is a 62y old  Male who presents with a chief complaint of Chest pain.    Chest pain:  Tele  Cardio f/up appreciated.  ASA/Amio/Ranexa/Imdur   C. Cath : LHC: LM normal, pLAD 40-50%, mLAD 70-80%, LCx normal, pRCA 100%, posterobasal aneurysm, EF 35%,     HTN:  Losartan/Lasix/    Uncontrolled DM II:  FSSS/ /156/232.    HLD:  Lipitor    Lt ventricular aneurysm:  CT chest in 6 month.
Patient is a 62y old  Male who presents with a chief complaint of Chest pain.    Chest pain:  Tele  EP f/up appreciated.  ASA/Amio/Ranexa/Imdur   C. Cath : LHC: LM normal, pLAD 40-50%, mLAD 70-80%, LCx normal, pRCA 100%, posterobasal aneurysm, EF 35%,     HTN:  Losartan/Lasix/    Uncontrolled DM II:  FSSS/     HLD:  Lipitor          Lt ventricular aneurysm:  CT chest in 6 month.

## 2017-10-30 ENCOUNTER — RESULT CHARGE (OUTPATIENT)
Age: 62
End: 2017-10-30

## 2017-10-30 ENCOUNTER — APPOINTMENT (OUTPATIENT)
Dept: ENDOCRINOLOGY | Facility: CLINIC | Age: 62
End: 2017-10-30
Payer: COMMERCIAL

## 2017-10-30 VITALS
HEIGHT: 63 IN | DIASTOLIC BLOOD PRESSURE: 80 MMHG | OXYGEN SATURATION: 98 % | SYSTOLIC BLOOD PRESSURE: 140 MMHG | HEART RATE: 64 BPM | BODY MASS INDEX: 36.86 KG/M2 | WEIGHT: 208 LBS

## 2017-10-30 DIAGNOSIS — Z87.891 PERSONAL HISTORY OF NICOTINE DEPENDENCE: ICD-10-CM

## 2017-10-30 DIAGNOSIS — E66.9 TYPE 2 DIABETES MELLITUS WITH OTHER SPECIFIED COMPLICATION: ICD-10-CM

## 2017-10-30 DIAGNOSIS — E11.69 TYPE 2 DIABETES MELLITUS WITH OTHER SPECIFIED COMPLICATION: ICD-10-CM

## 2017-10-30 DIAGNOSIS — I38 ENDOCARDITIS, VALVE UNSPECIFIED: ICD-10-CM

## 2017-10-30 DIAGNOSIS — Z00.00 ENCOUNTER FOR GENERAL ADULT MEDICAL EXAMINATION W/OUT ABNORMAL FINDINGS: ICD-10-CM

## 2017-10-30 DIAGNOSIS — Z83.3 FAMILY HISTORY OF DIABETES MELLITUS: ICD-10-CM

## 2017-10-30 DIAGNOSIS — I25.10 ATHEROSCLEROTIC HEART DISEASE OF NATIVE CORONARY ARTERY W/OUT ANGINA PECTORIS: ICD-10-CM

## 2017-10-30 LAB
GLUCOSE BLDC GLUCOMTR-MCNC: 174
HBA1C MFR BLD HPLC: 7.7

## 2017-10-30 PROCEDURE — 99244 OFF/OP CNSLTJ NEW/EST MOD 40: CPT

## 2017-10-30 RX ORDER — ATORVASTATIN CALCIUM 40 MG/1
40 TABLET, FILM COATED ORAL
Refills: 0 | Status: ACTIVE | COMMUNITY

## 2017-10-30 RX ORDER — METOPROLOL SUCCINATE 25 MG/1
25 TABLET, EXTENDED RELEASE ORAL
Refills: 0 | Status: ACTIVE | COMMUNITY

## 2017-10-30 RX ORDER — WARFARIN SODIUM 6 MG/1
TABLET ORAL
Refills: 0 | Status: ACTIVE | COMMUNITY

## 2017-10-30 RX ORDER — METFORMIN HYDROCHLORIDE 1000 MG/1
1000 TABLET, COATED ORAL
Qty: 1 | Refills: 5 | Status: ACTIVE | COMMUNITY
Start: 2017-10-30 | End: 1900-01-01

## 2017-10-31 RX ORDER — LANCING DEVICE
EACH MISCELLANEOUS TWICE DAILY
Qty: 60 | Refills: 11 | Status: ACTIVE | COMMUNITY
Start: 2017-10-31 | End: 1900-01-01

## 2017-10-31 RX ORDER — LANCING DEVICE
W/DEVICE EACH MISCELLANEOUS
Qty: 1 | Refills: 0 | Status: ACTIVE | COMMUNITY
Start: 2017-10-31 | End: 1900-01-01

## 2018-01-01 NOTE — ED ADULT NURSE NOTE - ATTEMPT TO OOB
Infant bonding well with both parents.  Void and stool today and feeding in clusters.  VSS  Assessment WNL   no

## 2019-02-06 NOTE — PATIENT PROFILE ADULT. - BLOOD TRANSFUSION, PREVIOUS, PROFILE
Matilde comes in today with many complaints about her new left-sided earmold. She says this has made by Dr. Ross a few weeks ago.  It was actually dispensed 7/31/18. She says the new earmold drive her crazy. It is both too occluding and also causes her hearing aid to sound too high-pitched. She says the  wire and behind the ear portion of her hearing aid follow-up off more easily now.    I reviewed the patient's records to see what type of earmolds she wore in the past. Her previous earmold was the same style 89 from [employer], same older blast material. The only difference is the venting. The current hearing aid has a size medium vent. The previous earmold had a larger MVP vent.    I am not able to modify this soft material in the office, so we'll send it to [facility] for modification of the vent. I attempted to put an open dome and a tulip dome on the hearing aid for the patient to use in the meantime. She complained about the sound quality and said it  sounded even tinny ear. I handed the putting a size large Oticon  tip with large vent on her.  She still does not seem happy with this and does not like the sound quality. I told her we will set up an appointment to refit the earmold when it comes in.  We may have to adjust the hearing aid for her complaint of tinniness, as I do not think the change in earmold venting is going to make a dramatic difference. She does say that she did not have problems with the sound quality until she got the new earmold, however.     Otoscopy shows a clear ear canal on the left side.    no

## 2019-08-05 PROBLEM — E11.69 DIABETES MELLITUS TYPE 2 IN OBESE: Status: ACTIVE | Noted: 2017-10-30

## 2019-12-24 ENCOUNTER — INPATIENT (INPATIENT)
Facility: HOSPITAL | Age: 64
LOS: 8 days | Discharge: HOME | End: 2020-01-02
Attending: INTERNAL MEDICINE | Admitting: INTERNAL MEDICINE
Payer: COMMERCIAL

## 2019-12-24 VITALS
RESPIRATION RATE: 20 BRPM | DIASTOLIC BLOOD PRESSURE: 99 MMHG | OXYGEN SATURATION: 98 % | TEMPERATURE: 98 F | SYSTOLIC BLOOD PRESSURE: 160 MMHG | HEART RATE: 74 BPM

## 2019-12-24 DIAGNOSIS — I50.9 HEART FAILURE, UNSPECIFIED: ICD-10-CM

## 2019-12-24 LAB
ALBUMIN SERPL ELPH-MCNC: 3.7 G/DL — SIGNIFICANT CHANGE UP (ref 3.3–5)
ALBUMIN SERPL ELPH-MCNC: 3.8 G/DL — SIGNIFICANT CHANGE UP (ref 3.3–5)
ALP SERPL-CCNC: 69 U/L — SIGNIFICANT CHANGE UP (ref 40–120)
ALP SERPL-CCNC: 72 U/L — SIGNIFICANT CHANGE UP (ref 40–120)
ALT FLD-CCNC: 23 U/L — SIGNIFICANT CHANGE UP (ref 4–41)
ALT FLD-CCNC: 23 U/L — SIGNIFICANT CHANGE UP (ref 4–41)
ANION GAP SERPL CALC-SCNC: 10 MMO/L — SIGNIFICANT CHANGE UP (ref 7–14)
ANION GAP SERPL CALC-SCNC: 13 MMO/L — SIGNIFICANT CHANGE UP (ref 7–14)
APTT BLD: 29.2 SEC — SIGNIFICANT CHANGE UP (ref 27.5–36.3)
AST SERPL-CCNC: 20 U/L — SIGNIFICANT CHANGE UP (ref 4–40)
AST SERPL-CCNC: 22 U/L — SIGNIFICANT CHANGE UP (ref 4–40)
BASE EXCESS BLDV CALC-SCNC: 5.7 MMOL/L — SIGNIFICANT CHANGE UP
BASOPHILS # BLD AUTO: 0.06 K/UL — SIGNIFICANT CHANGE UP (ref 0–0.2)
BASOPHILS NFR BLD AUTO: 0.7 % — SIGNIFICANT CHANGE UP (ref 0–2)
BILIRUB SERPL-MCNC: 0.5 MG/DL — SIGNIFICANT CHANGE UP (ref 0.2–1.2)
BILIRUB SERPL-MCNC: 0.6 MG/DL — SIGNIFICANT CHANGE UP (ref 0.2–1.2)
BLOOD GAS VENOUS - CREATININE: SIGNIFICANT CHANGE UP MG/DL (ref 0.5–1.3)
BUN SERPL-MCNC: 23 MG/DL — SIGNIFICANT CHANGE UP (ref 7–23)
BUN SERPL-MCNC: 24 MG/DL — HIGH (ref 7–23)
CALCIUM SERPL-MCNC: 9.1 MG/DL — SIGNIFICANT CHANGE UP (ref 8.4–10.5)
CALCIUM SERPL-MCNC: 9.2 MG/DL — SIGNIFICANT CHANGE UP (ref 8.4–10.5)
CHLORIDE BLDV-SCNC: 105 MMOL/L — SIGNIFICANT CHANGE UP (ref 96–108)
CHLORIDE SERPL-SCNC: 101 MMOL/L — SIGNIFICANT CHANGE UP (ref 98–107)
CHLORIDE SERPL-SCNC: 103 MMOL/L — SIGNIFICANT CHANGE UP (ref 98–107)
CO2 SERPL-SCNC: 25 MMOL/L — SIGNIFICANT CHANGE UP (ref 22–31)
CO2 SERPL-SCNC: 26 MMOL/L — SIGNIFICANT CHANGE UP (ref 22–31)
CREAT SERPL-MCNC: 0.98 MG/DL — SIGNIFICANT CHANGE UP (ref 0.5–1.3)
CREAT SERPL-MCNC: 0.98 MG/DL — SIGNIFICANT CHANGE UP (ref 0.5–1.3)
EOSINOPHIL # BLD AUTO: 0.22 K/UL — SIGNIFICANT CHANGE UP (ref 0–0.5)
EOSINOPHIL NFR BLD AUTO: 2.6 % — SIGNIFICANT CHANGE UP (ref 0–6)
GAS PNL BLDV: 136 MMOL/L — SIGNIFICANT CHANGE UP (ref 136–146)
GLUCOSE BLDV-MCNC: 180 MG/DL — HIGH (ref 70–99)
GLUCOSE SERPL-MCNC: 154 MG/DL — HIGH (ref 70–99)
GLUCOSE SERPL-MCNC: 177 MG/DL — HIGH (ref 70–99)
HCO3 BLDV-SCNC: 29 MMOL/L — HIGH (ref 20–27)
HCT VFR BLD CALC: 40.5 % — SIGNIFICANT CHANGE UP (ref 39–50)
HCT VFR BLDV CALC: 38.9 % — LOW (ref 39–51)
HGB BLD-MCNC: 12.8 G/DL — LOW (ref 13–17)
HGB BLDV-MCNC: 12.7 G/DL — LOW (ref 13–17)
IMM GRANULOCYTES NFR BLD AUTO: 0.4 % — SIGNIFICANT CHANGE UP (ref 0–1.5)
INR BLD: 1.24 — HIGH (ref 0.88–1.17)
LACTATE BLDV-MCNC: 1.2 MMOL/L — SIGNIFICANT CHANGE UP (ref 0.5–2)
LYMPHOCYTES # BLD AUTO: 0.89 K/UL — LOW (ref 1–3.3)
LYMPHOCYTES # BLD AUTO: 10.5 % — LOW (ref 13–44)
MCHC RBC-ENTMCNC: 29.3 PG — SIGNIFICANT CHANGE UP (ref 27–34)
MCHC RBC-ENTMCNC: 31.6 % — LOW (ref 32–36)
MCV RBC AUTO: 92.7 FL — SIGNIFICANT CHANGE UP (ref 80–100)
MONOCYTES # BLD AUTO: 0.61 K/UL — SIGNIFICANT CHANGE UP (ref 0–0.9)
MONOCYTES NFR BLD AUTO: 7.2 % — SIGNIFICANT CHANGE UP (ref 2–14)
NEUTROPHILS # BLD AUTO: 6.66 K/UL — SIGNIFICANT CHANGE UP (ref 1.8–7.4)
NEUTROPHILS NFR BLD AUTO: 78.6 % — HIGH (ref 43–77)
NRBC # FLD: 0 K/UL — SIGNIFICANT CHANGE UP (ref 0–0)
NT-PROBNP SERPL-SCNC: 1566 PG/ML — SIGNIFICANT CHANGE UP
NT-PROBNP SERPL-SCNC: 1696 PG/ML — SIGNIFICANT CHANGE UP
PCO2 BLDV: 49 MMHG — SIGNIFICANT CHANGE UP (ref 41–51)
PH BLDV: 7.41 PH — SIGNIFICANT CHANGE UP (ref 7.32–7.43)
PLATELET # BLD AUTO: 160 K/UL — SIGNIFICANT CHANGE UP (ref 150–400)
PMV BLD: 11 FL — SIGNIFICANT CHANGE UP (ref 7–13)
PO2 BLDV: 54 MMHG — HIGH (ref 35–40)
POTASSIUM BLDV-SCNC: 4.2 MMOL/L — SIGNIFICANT CHANGE UP (ref 3.4–4.5)
POTASSIUM SERPL-MCNC: 4.1 MMOL/L — SIGNIFICANT CHANGE UP (ref 3.5–5.3)
POTASSIUM SERPL-MCNC: 4.5 MMOL/L — SIGNIFICANT CHANGE UP (ref 3.5–5.3)
POTASSIUM SERPL-SCNC: 4.1 MMOL/L — SIGNIFICANT CHANGE UP (ref 3.5–5.3)
POTASSIUM SERPL-SCNC: 4.5 MMOL/L — SIGNIFICANT CHANGE UP (ref 3.5–5.3)
PROT SERPL-MCNC: 6.2 G/DL — SIGNIFICANT CHANGE UP (ref 6–8.3)
PROT SERPL-MCNC: 6.5 G/DL — SIGNIFICANT CHANGE UP (ref 6–8.3)
PROTHROM AB SERPL-ACNC: 13.8 SEC — HIGH (ref 9.8–13.1)
RBC # BLD: 4.37 M/UL — SIGNIFICANT CHANGE UP (ref 4.2–5.8)
RBC # FLD: 13.4 % — SIGNIFICANT CHANGE UP (ref 10.3–14.5)
SAO2 % BLDV: 87.4 % — HIGH (ref 60–85)
SODIUM SERPL-SCNC: 138 MMOL/L — SIGNIFICANT CHANGE UP (ref 135–145)
SODIUM SERPL-SCNC: 140 MMOL/L — SIGNIFICANT CHANGE UP (ref 135–145)
TROPONIN T, HIGH SENSITIVITY: 26 NG/L — SIGNIFICANT CHANGE UP (ref ?–14)
TROPONIN T, HIGH SENSITIVITY: 29 NG/L — SIGNIFICANT CHANGE UP (ref ?–14)
WBC # BLD: 8.47 K/UL — SIGNIFICANT CHANGE UP (ref 3.8–10.5)
WBC # FLD AUTO: 8.47 K/UL — SIGNIFICANT CHANGE UP (ref 3.8–10.5)

## 2019-12-24 PROCEDURE — 71046 X-RAY EXAM CHEST 2 VIEWS: CPT | Mod: 26

## 2019-12-24 PROCEDURE — 99223 1ST HOSP IP/OBS HIGH 75: CPT

## 2019-12-24 RX ORDER — ASPIRIN/CALCIUM CARB/MAGNESIUM 324 MG
81 TABLET ORAL ONCE
Refills: 0 | Status: COMPLETED | OUTPATIENT
Start: 2019-12-24 | End: 2019-12-24

## 2019-12-24 RX ORDER — FUROSEMIDE 40 MG
40 TABLET ORAL ONCE
Refills: 0 | Status: COMPLETED | OUTPATIENT
Start: 2019-12-24 | End: 2019-12-24

## 2019-12-24 RX ADMIN — Medication 40 MILLIGRAM(S): at 20:39

## 2019-12-24 RX ADMIN — Medication 81 MILLIGRAM(S): at 22:16

## 2019-12-24 NOTE — H&P ADULT - ASSESSMENT
65 y/o M with PMH of CHF, CAD, HTN, Type 2 Diabetes, presents to the ED with 3 days of worsening chest pain and also with Dyspnea of exertion and shortness of breath. R/o Congestive Heart failure exacerbation.

## 2019-12-24 NOTE — H&P ADULT - HISTORY OF PRESENT ILLNESS
65 y/o M with PMH of CHF, CAD, Type 2 Diabetes, HTN, Afib CHADVASC score of 5 presents to the ED with 3 days of worsening chest pain and also with Dyspnea of exertion and shortness of breath. Patient describes pain as intermittent and states that yesterday it has been lasting longer. Patient describes pain as being punched in chest and states that pain started on the left sided chest and radiated to right side of chest. Patient states that yesterday pain was 8/10 but now pain is very mild. Patient states that shortness of breath has been becoming worse. Patient admits to feeling short of breath when sitting and walking and states that he is unable to lay flat and has to sleep with several pillows. Patient also admits to having swollen abdomen and states that he has feeling bloated lately. Patient admits to having constipation, knee pain and occasional right arm numbness which occurs when lifting arm and endorses epigastric pain. Patient denies fever, chills. 65 y/o M with PMH of CHF, CAD, Type 2 Diabetes, HTN, denies hx of Afib CHADVASC score of 5 presents to the ED with 3 days of worsening chest pain and also with Dyspnea of exertion and shortness of breath. Patient describes pain as intermittent and states that yesterday it has been lasting longer. Patient describes pain as being punched in chest and states that pain started on the left sided chest and radiated to right side of chest. Patient states that yesterday pain was 8/10 but now pain is very mild. Patient states that shortness of breath has been becoming worse. Patient admits to feeling short of breath when sitting and walking and states that he is unable to lay flat and has to sleep with several pillows. Patient also admits to having swollen abdomen and states that he has feeling bloated lately. Patient admits to having constipation, knee pain and occasional right arm numbness which occurs when lifting arm and endorses epigastric pain. Patient denies fever, chills.

## 2019-12-24 NOTE — H&P ADULT - NSHPLABSRESULTS_GEN_ALL_CORE
Vital Signs Last 24 Hrs  T(C): 37.1 (24 Dec 2019 23:34), Max: 37.1 (24 Dec 2019 23:34)  T(F): 98.7 (24 Dec 2019 23:34), Max: 98.7 (24 Dec 2019 23:34)  HR: 69 (24 Dec 2019 23:34) (69 - 97)  BP: 153/83 (24 Dec 2019 23:34) (133/84 - 160/99)  BP(mean): --  RR: 18 (24 Dec 2019 23:34) (18 - 20)  SpO2: 97% (24 Dec 2019 23:34) (97% - 98%)    CBC Full  -  ( 24 Dec 2019 20:08 )  WBC Count : 8.47 K/uL  RBC Count : 4.37 M/uL  Hemoglobin : 12.8 g/dL  Hematocrit : 40.5 %  Platelet Count - Automated : 160 K/uL  Mean Cell Volume : 92.7 fL  Mean Cell Hemoglobin : 29.3 pg  Mean Cell Hemoglobin Concentration : 31.6 %  Auto Neutrophil # : 6.66 K/uL  Auto Lymphocyte # : 0.89 K/uL  Auto Monocyte # : 0.61 K/uL  Auto Eosinophil # : 0.22 K/uL  Auto Basophil # : 0.06 K/uL  Auto Neutrophil % : 78.6 %  Auto Lymphocyte % : 10.5 %  Auto Monocyte % : 7.2 %  Auto Eosinophil % : 2.6 %  Auto Basophil % : 0.7 %    12-24    140  |  101  |  23  ----------------------------<  154<H>  4.1   |  26  |  0.98    Ca    9.2      24 Dec 2019 22:07    TPro  6.5  /  Alb  3.8  /  TBili  0.6  /  DBili  x   /  AST  22  /  ALT  23  /  AlkPhos  72  12-24    Troponin: 26-->29  Coagulations: PT 13.8, INR 1.24, Aptt 29.2.    EKG: A-fib at 82 bpm with PVC. QT/QTc 388/453.    CXR: Prelim: No Focal consolidation. Vital Signs Last 24 Hrs  T(C): 37.1 (24 Dec 2019 23:34), Max: 37.1 (24 Dec 2019 23:34)  T(F): 98.7 (24 Dec 2019 23:34), Max: 98.7 (24 Dec 2019 23:34)  HR: 69 (24 Dec 2019 23:34) (69 - 97)  BP: 153/83 (24 Dec 2019 23:34) (133/84 - 160/99)  BP(mean): --  RR: 18 (24 Dec 2019 23:34) (18 - 20)  SpO2: 97% (24 Dec 2019 23:34) (97% - 98%)    CBC Full  -  ( 24 Dec 2019 20:08 )  WBC Count : 8.47 K/uL  RBC Count : 4.37 M/uL  Hemoglobin : 12.8 g/dL  Hematocrit : 40.5 %  Platelet Count - Automated : 160 K/uL  Mean Cell Volume : 92.7 fL  Mean Cell Hemoglobin : 29.3 pg  Mean Cell Hemoglobin Concentration : 31.6 %  Auto Neutrophil # : 6.66 K/uL  Auto Lymphocyte # : 0.89 K/uL  Auto Monocyte # : 0.61 K/uL  Auto Eosinophil # : 0.22 K/uL  Auto Basophil # : 0.06 K/uL  Auto Neutrophil % : 78.6 %  Auto Lymphocyte % : 10.5 %  Auto Monocyte % : 7.2 %  Auto Eosinophil % : 2.6 %  Auto Basophil % : 0.7 %    12-24    140  |  101  |  23  ----------------------------<  154<H>  4.1   |  26  |  0.98    Ca    9.2      24 Dec 2019 22:07    TPro  6.5  /  Alb  3.8  /  TBili  0.6  /  DBili  x   /  AST  22  /  ALT  23  /  AlkPhos  72  12-24    Troponin: 26-->29  Coagulations: PT 13.8, INR 1.24, Aptt 29.2.    EKG: A-fib at 82 bpm with PVC. QT/QTc 388/453.    CXR: Prelim: No Focal consolidation.    Sept 2017 Cath:   VENTRICLES: Global left ventricular function was moderately depressed. EF  estimated was 35 %. There was a large aneurysm involving the posterobasal wall  with evidence of calcification and likely thrombus.  VALVES: MITRAL VALVE: The mitral valve was evaluated by left ventriculography.  The mitral valve exhibited moderate to severe regurgitation.  CORONARY VESSELS: The coronary circulation is right dominant.  LM:   --  LM: Angiography showed minor luminal irregularities with no flow  limiting lesions.  --  Distal left main: There was a discrete 20 % stenosis.  LAD:   --  Proximal LAD: There was a discrete 40 % stenosis.  --  Mid LAD: There was a tubular 70 % stenosis. There was ZAYRA grade 3 flow  through the vessel (brisk flow). --  D1: Angiography showed minor luminal  irregularities with no flow limiting lesions.  CX:   --  Circumflex: Angiography showed minor luminal irregularities with no  flow limiting lesions.  RCA:   --  Proximal RCA: There was a 100 % stenosis. There was ZAYRA grade 0  flow through the vessel (no flow).  COMPLICATIONS: There were no complications.  DIAGNOSTIC RECOMMENDATIONS: Medical management is recommended. Echocardiogram  to assess mitral regurgitation severity.    Echo sept 2017:  1. Mitral annular calcification, otherwise normal mitral  valve. Moderate-severe mitral regurgitation.  2. Calcified trileaflet aortic valve with decreased  opening. Peak transaortic valve gradient equals 27 mm Hg,  mean transaortic valve gradient equals 20 mm Hg, consistent  with probable mild to moderate aortic stenosis.  3. Severe left ventricular enlargement.  4. Severe segmental left ventricular systolic dysfunction.  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  Thrombus is  visualized within the aneurysm cavity.  Endocardial  visualization enhanced with intravenous injection of echo  contrast (Definity).  5. Unable to accurately evaluate right ventricular size or  systolic function.  *** No previous Echo exam. Vital Signs Last 24 Hrs  T(C): 37.1 (24 Dec 2019 23:34), Max: 37.1 (24 Dec 2019 23:34)  T(F): 98.7 (24 Dec 2019 23:34), Max: 98.7 (24 Dec 2019 23:34)  HR: 69 (24 Dec 2019 23:34) (69 - 97)  BP: 153/83 (24 Dec 2019 23:34) (133/84 - 160/99)  BP(mean): --  RR: 18 (24 Dec 2019 23:34) (18 - 20)  SpO2: 97% (24 Dec 2019 23:34) (97% - 98%)    CBC Full  -  ( 24 Dec 2019 20:08 )  WBC Count : 8.47 K/uL  RBC Count : 4.37 M/uL  Hemoglobin : 12.8 g/dL  Hematocrit : 40.5 %  Platelet Count - Automated : 160 K/uL  Mean Cell Volume : 92.7 fL  Mean Cell Hemoglobin : 29.3 pg  Mean Cell Hemoglobin Concentration : 31.6 %  Auto Neutrophil # : 6.66 K/uL  Auto Lymphocyte # : 0.89 K/uL  Auto Monocyte # : 0.61 K/uL  Auto Eosinophil # : 0.22 K/uL  Auto Basophil # : 0.06 K/uL  Auto Neutrophil % : 78.6 %  Auto Lymphocyte % : 10.5 %  Auto Monocyte % : 7.2 %  Auto Eosinophil % : 2.6 %  Auto Basophil % : 0.7 %    12-24    140  |  101  |  23  ----------------------------<  154<H>  4.1   |  26  |  0.98    Ca    9.2      24 Dec 2019 22:07    TPro  6.5  /  Alb  3.8  /  TBili  0.6  /  DBili  x   /  AST  22  /  ALT  23  /  AlkPhos  72  12-24    Troponin: 26-->29  Coagulations: PT 13.8, INR 1.24, Aptt 29.2.    EKG personally reviewed: A-fib at 82 bpm with PVC. QT/QTc 388/453 ms.    CXR personally reviewed: Prelim: No Focal consolidation.    Sept 2017 Cath:   VENTRICLES: Global left ventricular function was moderately depressed. EF  estimated was 35 %. There was a large aneurysm involving the posterobasal wall  with evidence of calcification and likely thrombus.  VALVES: MITRAL VALVE: The mitral valve was evaluated by left ventriculography.  The mitral valve exhibited moderate to severe regurgitation.  CORONARY VESSELS: The coronary circulation is right dominant.  LM:   --  LM: Angiography showed minor luminal irregularities with no flow  limiting lesions.  --  Distal left main: There was a discrete 20 % stenosis.  LAD:   --  Proximal LAD: There was a discrete 40 % stenosis.  --  Mid LAD: There was a tubular 70 % stenosis. There was ZAYRA grade 3 flow  through the vessel (brisk flow). --  D1: Angiography showed minor luminal  irregularities with no flow limiting lesions.  CX:   --  Circumflex: Angiography showed minor luminal irregularities with no  flow limiting lesions.  RCA:   --  Proximal RCA: There was a 100 % stenosis. There was ZAYRA grade 0  flow through the vessel (no flow).  COMPLICATIONS: There were no complications.  DIAGNOSTIC RECOMMENDATIONS: Medical management is recommended. Echocardiogram  to assess mitral regurgitation severity.    Echo sept 2017:  1. Mitral annular calcification, otherwise normal mitral  valve. Moderate-severe mitral regurgitation.  2. Calcified trileaflet aortic valve with decreased  opening. Peak transaortic valve gradient equals 27 mm Hg,  mean transaortic valve gradient equals 20 mm Hg, consistent  with probable mild to moderate aortic stenosis.  3. Severe left ventricular enlargement.  4. Severe segmental left ventricular systolic dysfunction.  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  Thrombus is  visualized within the aneurysm cavity.  Endocardial  visualization enhanced with intravenous injection of echo  contrast (Definity).  5. Unable to accurately evaluate right ventricular size or  systolic function.  *** No previous Echo exam.

## 2019-12-24 NOTE — H&P ADULT - PROBLEM SELECTOR PLAN 9
Transitions of Care Status:  1.  Name of PCP: Dr. Gonzalez  2.  PCP Contacted on Admission: [ ] Y    [x ] N    3.  PCP contacted at Discharge: [ ] Y    [ ] N    [ ] N/A  4.  Post-Discharge Appointment Date and Location:  5.  Summary of Handoff given to PCP: -DVT pps: Will hold off on heparin prophylaxis until reason for being off AC is determined.

## 2019-12-24 NOTE — H&P ADULT - PROBLEM SELECTOR PLAN 3
Continue to monitor, not on anticoagulation States Dr. Gonzalez took him off AC 6 months ago. CHADVASC score of 5. Continue Metoprolol succinate 25 mg. Continue to monitor, not on anticoagulation States Dr. Gonzalez took him off AC 6 months ago. CHADVASC score of 5. Continue Metoprolol succinate 25 mg. Currently rate controlled. Continue to monitor, not on anticoagulation States Dr. Gonzalez took him off AC 6 months ago. Will hold off anticoagulation until reason for not being on it determined. CHADVASC score of 5. Continue Metoprolol succinate 25 mg. Currently rate controlled. Unclear if pt with h/o afib. Pt denies h/o afib. But EKG on admission with ?afib.  -Pt states Dr. Gonzalez took him off anticoagulation 6 months ago. Will hold off on anticoagulation until reason for not being on it determined. CHADVASC score of 5. -Continue Metoprolol succinate 25 mg daily. Currently rate controlled.

## 2019-12-24 NOTE — H&P ADULT - PROBLEM SELECTOR PLAN 8
Heparin prophylaxis 5000 units q12h. Will hold off on Heparin prophylaxis until reason for being off AC is determined. -Full ROM on exam with no S/S of infection. Likely osteoarthritis given obesity. Continue to monitor. Bilateral knee X-ray ordered.

## 2019-12-24 NOTE — ED PROVIDER NOTE - PRO INTERPRETER NEED 2
Nonspecific Dermatitis  Dermatitis is a skin rash caused by something that touches the skin and makes it irritated and inflamed.  Your skin may be red, swollen, dry, and may be cracked. Blisters may form and ooze. The rash will itch.  Dermatitis can form on the face and neck, backs of hands, forearms, genitals, and lower legs. Dermatitis is not passed from person to person.  Talk with your health care provider about what may have caused the rash. Common things that cause skin allergies are metal in jewelry, plants like poison ivy or poison oak, and certain skin care products. You will need to avoid the source of your rash in the future to prevent it from coming back. In some cases, the cause of the dermatitis may not be found.  Treatment is done to relieve itching and prevent the rash from coming back. The rash should go away in a few days to a few weeks.  Home care  The health care provider may prescribe medications to relieve swelling and itching. Follow all instructions when using these medications.  · Avoid anything that heats up your skin, such as hot showers or baths, or direct sunlight. This can make itching worse.  · Stay away from whatever you think caused the rash.  · Bathe in warm, not hot, water. Apply a moisturizing lotion after bathing to prevent dry skin.  · Avoid skin irritants such as wool or silk clothing, grease, oils, harsh soaps, and detergents.  · Apply cold compresses to soothe your sores to help relieve your symptoms. Do this for 30 minutes 3 to 4 times a day. You can make a cold compress by soaking a cloth in cold water. Squeeze out excess water. You can add colloidal oatmeal to the water to help reduce itching. For severe itching in a small area, apply an ice pack wrapped in a thin towel. Do this for 20 minutes 3 to 4 times a day.  · You can also help relieve large areas of itching by taking a lukewarm bath with colloidal oatmeal added to the water.  · Use hydrocortisone cream for redness  and irritation, unless another medicine was prescribed. You can also use benzocaine anesthetic cream or spray.  · Use oral diphenhydramine to help reduce itching. This is an antihistamine you can buy at drug and grocery stores. It can make you sleepy, so use lower doses during the daytime. Or you can use loratadine. This is an antihistamine that will not make you sleepy. Dont use diphenhydramine if you have glaucoma or have trouble urinating because of an enlarged prostate.  · Wash your hands or use an antibacterial gel often to prevent the spread of the rash.  Follow-up care  Follow up with your health care provider. Make an appointment with your health care provider if your symptoms do not get better in the next 1 to 2 weeks.  When to seek medical advice  Call your health care provider right away if any of these occur:  · Spreading of the rash to other parts of your body  · Severe swelling of your face, eyelids, mouth, throat or tongue  · Trouble urinating due to swelling in the genital area  · Fever of 100.4°F (38°C) or higher  · Redness or swelling that gets worse  · Pain that gets worse  · Foul-smelling fluid leaking from the skin  · Yellow-brown crusts on the open blisters  · Joint pain   Date Last Reviewed: 7/23/2014  © 8468-2848 [employer]. [address] [place] [address], [place] [address]. All rights reserved. This information is not intended as a substitute for professional medical care. Always follow your healthcare professional's instructions.        Self-Care for Skin Rashes     Pat your skin dry. Do not rub.     When your skin reacts to a substance your body is sensitive to, it can cause a rash. You can treat most rashes at home by keeping the skin clean and dry. Many rashes may get better on their own within 2 to 3 days. You may need medical attention if your rash itches, drains, or hurts, particularly if the rash is getting worse.  What can cause a skin rash?  · Sun poisoning, caused by too  much exposure to the sun  · An irritant or allergic reaction to a certain type of food, plant, or chemical, such as  shellfish, poison ivy, and or cleaning products  · An infection caused by a fungus (ringworm), virus (chickenpox), or bacteria (strep)  · Bites or infestation caused by insects or pests, such as ticks, lice, or mites  · Dry skin, which is often seen during the winter months and in older people  How can I control itching and skin damage?  · Take soothing lukewarm baths in a colloidal oatmeal product. You can buy this at the [employer].  · Do your best not to scratch. Clip fingernails short, especially in young children, to reduce skin damage if scratching does occur.  · Use moisturizing skin lotion instead of scratching your dry skin.  · Use sunscreen whenever going out into direct sun.  · Use only mild cleansing agents whenever possible.  · Wash with mild, nonirritating soap and warm water.  · Wear clothing that breathes, such as cotton shirts or canvas shoes.  · If fluid is seeping from the rash, cover it loosely with clean gauze to absorb the discharge.  · Many rashes are contagious. Prevent the rash from spreading to others by washing your hands often before or after touching others with any skin rash.  Use medicine  · Antihistamines such as diphenhydramine can help control itching. But use with caution because they can make you drowsy.  · Using over-the-counter hydrocortisone cream on small rashes may help reduce swelling and itching  · Most over-the-counter antifungal medicines can treat athletes foot and many other fungal infections of the skin.  Check with your healthcare provider  Call your healthcare provider if:  · You were told that you have a fungal infection on your skin to make sure you have the correct type of medicine.  · You have questions or concerns about medicines or their side effects.     Call 911  Call 911 if either of these occur:  · Your tongue or lips start to swell  · You  have difficulty breathing      Call your healthcare provider  Call your healthcare provider if any of these occur:  · Temperature of more than 101.0°F (38.3°C), or as directed  · Sore throat, a cough, or unusual fatigue  · Red, oozy, or painful rash gets worse. These are signs of infection.  · Rash covers your face, genitals, or most of your body  · Crusty sores or red rings that begin to spread  · You were exposed to someone who has a contagious rash, such as scabies or lice.  · Red bulls-eye rash with a white center (a sign of Lyme disease)  · You were told that you have resistant bacteria (MRSA) on your skin.   Date Last Reviewed: 5/12/2015  © 9727-6798 [employer]. [address] [place] [address], [place] [address]. All rights reserved. This information is not intended as a substitute for professional medical care. Always follow your healthcare professional's instructions.         Deny

## 2019-12-24 NOTE — H&P ADULT - PROBLEM SELECTOR PLAN 10
Transitions of Care Status:  1.  Name of PCP: ?Dr. Gonzalez  2.  PCP Contacted on Admission: [ ] Y    [x] N    3.  PCP contacted at Discharge: [ ] Y    [ ] N    [ ] N/A  4.  Post-Discharge Appointment Date and Location:  5.  Summary of Handoff given to PCP:

## 2019-12-24 NOTE — H&P ADULT - PROBLEM SELECTOR PLAN 1
Admit to tele, continue to monitor, Echo ordered, Lasix 40 mg IVP BID, Strict I&Os. Admit to tele, continue to monitor, Echo ordered, Lasix 40 mg IVP BID, Strict I&Os. Cardiology consult in AM. -Monitor on tele  -TTE ordered  -Lasix 40 mg IVP BID for now  -Strict I&Os, daily weights  -Cardiology consult in AM - need to find out if pt actually follows with Dr. Gonzalez as outpatient

## 2019-12-24 NOTE — H&P ADULT - PROBLEM SELECTOR PROBLEM 4
Hypertension Type 2 diabetes mellitus without complication, without long-term current use of insulin

## 2019-12-24 NOTE — ED PROVIDER NOTE - PHYSICAL EXAMINATION
Gen: No acute distress, alert, cooperative  HENT: Normocephalic, atraumatic.   Eyes: PERRL, EOMI    Resp: crackles bases  CV: rrr, no murmur  Abd: soft, NT, bloated, no rebound or guarding  MSK: LE pitting edema  Skin: warm and dry  Neuro: AOx3, speech is fluent and appropriate, no focal deficits  Psych: Normal affect

## 2019-12-24 NOTE — ED ADULT NURSE NOTE - OBJECTIVE STATEMENT
Pt aa&ox3 PMH "Heart Problem", HTN presenting from home for SOB and CP. SOB worse on exertion. Pt placed on O2 sating at 97% on RA. Pt in AFIB on monitor, no known HX. Pt c/o slight cp in ED. 20g IV placed in RT forearm, labs sent. Will monitor.

## 2019-12-24 NOTE — H&P ADULT - NSHPSOCIALHISTORY_GEN_ALL_CORE
Patient lives with family. Patient denies use of cigarettes, but admits to drinking occasionally. Patient lives with family. Patient denies use of cigarettes, but admits to drinking occasionally. Denies any illicit drug use.

## 2019-12-24 NOTE — ED ADULT NURSE NOTE - CHPI ED NUR SYMPTOMS NEG
no congestion/no back pain/no dizziness/no fever/no diaphoresis/no nausea/no vomiting/no chills/no syncope

## 2019-12-24 NOTE — ED ADULT NURSE REASSESSMENT NOTE - NS ED NURSE REASSESS COMMENT FT1
pt received alert and oriented x4. pt nad. afib on cm. respirations equal and unlabored. pt c.o having slight sob. abd noted to be distended. iv intact. Call bell in reach, warm blanket provided, bed in lowest position, side rails up x2,safety maintained. will continue to monitor.

## 2019-12-24 NOTE — ED ADULT TRIAGE NOTE - CHIEF COMPLAINT QUOTE
Pt. c/o chest pain, sob, dizziness and nausea since yesterday. Respirations labored. Daughter states he was recently diagnosed with a "heart problem". Pmhx: CHF, CAD, HTN, HLD

## 2019-12-24 NOTE — H&P ADULT - NEGATIVE GASTROINTESTINAL SYMPTOMS
no abdominal pain/Epigastric pain no melena/no nausea/no vomiting/Epigastric pain/no change in bowel habits/no hematochezia/no constipation/no diarrhea

## 2019-12-24 NOTE — ED PROVIDER NOTE - ATTENDING CONTRIBUTION TO CARE
DR. SHAFFER, ATTENDING MD-  I performed a face to face bedside interview with the patient regarding history of present illness, review of symptoms and past medical history. I completed an independent physical exam.  I have discussed the patient's plan of care with the resident.   Documentation as above in the note.    64 y/oi male h/o chf with cp and sob x2 days.  Worse with exertion or lying flat.  Rales on exam, pedal edema.  CHF exac, consider acs as inciting event given cp.  Obtain cbc, cmp, trop, bnp, cxr, ekg, diurese, admit for further cardiac care.

## 2019-12-24 NOTE — ED PROVIDER NOTE - OBJECTIVE STATEMENT
63yo M hx CHF, CSAD, htn, presenting with 2 days of worsening chest pain, xie, sob. Worse SOB walking and lying flat. Noticed belly swelling recently. Takes asa, no ac. Denies fevers, cough, abdominal pain.

## 2019-12-24 NOTE — H&P ADULT - RS GEN PE MLT RESP DETAILS PC
Crackles at bases/respirations non-labored/airway patent rhonchi/no wheezes/respirations non-labored/Crackles at bases/airway patent

## 2019-12-24 NOTE — H&P ADULT - PROBLEM SELECTOR PLAN 4
Monitor Blood pressure. On Losartan and metoprolol. Start DASH diet. On Metformin at home.  -Start FS and ISS qAC an qhs  -Check HgbA1c in AM  - Endocrine consult pending HgbA1c level

## 2019-12-24 NOTE — H&P ADULT - ATTENDING COMMENTS
65 yo man with history of CHF, CAD, Type 2 DM, HTN, and HLD p/w worsening SOB/BLACK and abdominal distention, admitted for possible acute decompensated heart failure possibly in setting of new-onset afib. Pt states that he had previously been on AC but discontinued on it per Cardiology recs several months ago. Unclear reason for discontinuation. Pt without history of GIB or falls. [id number] 5. Will determine reason for discontinuation of AC prior to initiating any AC during this admission. Cardiology consult to be obtained.

## 2019-12-24 NOTE — ED PROVIDER NOTE - CLINICAL SUMMARY MEDICAL DECISION MAKING FREE TEXT BOX
63yo M hx CHF, CSAD, htn, presenting with 2 days of worsening chest pain, xie, sob. Suspect CHF exacerbation, will also eval for ACS with cp. Lasix, labs, trop, 65yo M hx CHF, CSAD, htn, presenting with 2 days of worsening chest pain, xie, sob. Suspect CHF exacerbation, will also eval for ACS with cp. Lasix, labs, trop, admit.

## 2019-12-24 NOTE — H&P ADULT - PROBLEM SELECTOR PLAN 2
Continue to monitor. Troponin 26-->29. Trend Cardiac enzymes. Echo ordered. Atypical. Currently, chest pain free. EKG without any acute ischemic changes.  -Monitor on tele  -Troponin 26-->29. Trend Cardiac enzymes.   -TTE ordered  -Monitor for CP and obtain serial EKGs

## 2019-12-25 DIAGNOSIS — R07.9 CHEST PAIN, UNSPECIFIED: ICD-10-CM

## 2019-12-25 DIAGNOSIS — E78.5 HYPERLIPIDEMIA, UNSPECIFIED: ICD-10-CM

## 2019-12-25 DIAGNOSIS — M25.561 PAIN IN RIGHT KNEE: ICD-10-CM

## 2019-12-25 DIAGNOSIS — K59.00 CONSTIPATION, UNSPECIFIED: ICD-10-CM

## 2019-12-25 DIAGNOSIS — I10 ESSENTIAL (PRIMARY) HYPERTENSION: ICD-10-CM

## 2019-12-25 DIAGNOSIS — E11.9 TYPE 2 DIABETES MELLITUS WITHOUT COMPLICATIONS: ICD-10-CM

## 2019-12-25 DIAGNOSIS — Z29.9 ENCOUNTER FOR PROPHYLACTIC MEASURES, UNSPECIFIED: ICD-10-CM

## 2019-12-25 DIAGNOSIS — R06.2 WHEEZING: ICD-10-CM

## 2019-12-25 DIAGNOSIS — I48.91 UNSPECIFIED ATRIAL FIBRILLATION: ICD-10-CM

## 2019-12-25 DIAGNOSIS — Z02.9 ENCOUNTER FOR ADMINISTRATIVE EXAMINATIONS, UNSPECIFIED: ICD-10-CM

## 2019-12-25 DIAGNOSIS — I50.9 HEART FAILURE, UNSPECIFIED: ICD-10-CM

## 2019-12-25 PROBLEM — I51.9 HEART DISEASE, UNSPECIFIED: Chronic | Status: ACTIVE | Noted: 2017-09-24

## 2019-12-25 LAB
ANION GAP SERPL CALC-SCNC: 12 MMO/L — SIGNIFICANT CHANGE UP (ref 7–14)
APTT BLD: 29.5 SEC — SIGNIFICANT CHANGE UP (ref 27.5–36.3)
APTT BLD: > 200 SEC — CRITICAL HIGH (ref 27.5–36.3)
BASOPHILS # BLD AUTO: 0.05 K/UL — SIGNIFICANT CHANGE UP (ref 0–0.2)
BASOPHILS NFR BLD AUTO: 0.6 % — SIGNIFICANT CHANGE UP (ref 0–2)
BUN SERPL-MCNC: 20 MG/DL — SIGNIFICANT CHANGE UP (ref 7–23)
CALCIUM SERPL-MCNC: 9.5 MG/DL — SIGNIFICANT CHANGE UP (ref 8.4–10.5)
CHLORIDE SERPL-SCNC: 99 MMOL/L — SIGNIFICANT CHANGE UP (ref 98–107)
CHOLEST SERPL-MCNC: 141 MG/DL — SIGNIFICANT CHANGE UP (ref 120–199)
CO2 SERPL-SCNC: 26 MMOL/L — SIGNIFICANT CHANGE UP (ref 22–31)
CREAT SERPL-MCNC: 1.08 MG/DL — SIGNIFICANT CHANGE UP (ref 0.5–1.3)
EOSINOPHIL # BLD AUTO: 0.24 K/UL — SIGNIFICANT CHANGE UP (ref 0–0.5)
EOSINOPHIL NFR BLD AUTO: 2.7 % — SIGNIFICANT CHANGE UP (ref 0–6)
GLUCOSE BLDC GLUCOMTR-MCNC: 160 MG/DL — HIGH (ref 70–99)
GLUCOSE BLDC GLUCOMTR-MCNC: 200 MG/DL — HIGH (ref 70–99)
GLUCOSE BLDC GLUCOMTR-MCNC: 220 MG/DL — HIGH (ref 70–99)
GLUCOSE SERPL-MCNC: 190 MG/DL — HIGH (ref 70–99)
HBA1C BLD-MCNC: 8.6 % — HIGH (ref 4–5.6)
HCT VFR BLD CALC: 42.9 % — SIGNIFICANT CHANGE UP (ref 39–50)
HCT VFR BLD CALC: 43.9 % — SIGNIFICANT CHANGE UP (ref 39–50)
HDLC SERPL-MCNC: 39 MG/DL — SIGNIFICANT CHANGE UP (ref 35–55)
HGB BLD-MCNC: 13.5 G/DL — SIGNIFICANT CHANGE UP (ref 13–17)
HGB BLD-MCNC: 13.8 G/DL — SIGNIFICANT CHANGE UP (ref 13–17)
IMM GRANULOCYTES NFR BLD AUTO: 0.6 % — SIGNIFICANT CHANGE UP (ref 0–1.5)
LIPID PNL WITH DIRECT LDL SERPL: 79 MG/DL — SIGNIFICANT CHANGE UP
LYMPHOCYTES # BLD AUTO: 0.77 K/UL — LOW (ref 1–3.3)
LYMPHOCYTES # BLD AUTO: 8.8 % — LOW (ref 13–44)
MAGNESIUM SERPL-MCNC: 1.7 MG/DL — SIGNIFICANT CHANGE UP (ref 1.6–2.6)
MCHC RBC-ENTMCNC: 29.3 PG — SIGNIFICANT CHANGE UP (ref 27–34)
MCHC RBC-ENTMCNC: 29.6 PG — SIGNIFICANT CHANGE UP (ref 27–34)
MCHC RBC-ENTMCNC: 31.4 % — LOW (ref 32–36)
MCHC RBC-ENTMCNC: 31.5 % — LOW (ref 32–36)
MCV RBC AUTO: 93.1 FL — SIGNIFICANT CHANGE UP (ref 80–100)
MCV RBC AUTO: 94 FL — SIGNIFICANT CHANGE UP (ref 80–100)
MONOCYTES # BLD AUTO: 0.6 K/UL — SIGNIFICANT CHANGE UP (ref 0–0.9)
MONOCYTES NFR BLD AUTO: 6.8 % — SIGNIFICANT CHANGE UP (ref 2–14)
NEUTROPHILS # BLD AUTO: 7.07 K/UL — SIGNIFICANT CHANGE UP (ref 1.8–7.4)
NEUTROPHILS NFR BLD AUTO: 80.5 % — HIGH (ref 43–77)
NRBC # FLD: 0 K/UL — SIGNIFICANT CHANGE UP (ref 0–0)
NRBC # FLD: 0 K/UL — SIGNIFICANT CHANGE UP (ref 0–0)
PHOSPHATE SERPL-MCNC: 3.7 MG/DL — SIGNIFICANT CHANGE UP (ref 2.5–4.5)
PLATELET # BLD AUTO: 180 K/UL — SIGNIFICANT CHANGE UP (ref 150–400)
PLATELET # BLD AUTO: 191 K/UL — SIGNIFICANT CHANGE UP (ref 150–400)
PMV BLD: 11.2 FL — SIGNIFICANT CHANGE UP (ref 7–13)
PMV BLD: 11.8 FL — SIGNIFICANT CHANGE UP (ref 7–13)
POTASSIUM SERPL-MCNC: 4.4 MMOL/L — SIGNIFICANT CHANGE UP (ref 3.5–5.3)
POTASSIUM SERPL-SCNC: 4.4 MMOL/L — SIGNIFICANT CHANGE UP (ref 3.5–5.3)
RBC # BLD: 4.61 M/UL — SIGNIFICANT CHANGE UP (ref 4.2–5.8)
RBC # BLD: 4.67 M/UL — SIGNIFICANT CHANGE UP (ref 4.2–5.8)
RBC # FLD: 13.5 % — SIGNIFICANT CHANGE UP (ref 10.3–14.5)
RBC # FLD: 13.5 % — SIGNIFICANT CHANGE UP (ref 10.3–14.5)
SODIUM SERPL-SCNC: 137 MMOL/L — SIGNIFICANT CHANGE UP (ref 135–145)
TRIGL SERPL-MCNC: 138 MG/DL — SIGNIFICANT CHANGE UP (ref 10–149)
TROPONIN T, HIGH SENSITIVITY: 27 NG/L — SIGNIFICANT CHANGE UP (ref ?–14)
TSH SERPL-MCNC: 0.99 UIU/ML — SIGNIFICANT CHANGE UP (ref 0.27–4.2)
WBC # BLD: 8.78 K/UL — SIGNIFICANT CHANGE UP (ref 3.8–10.5)
WBC # BLD: 9.95 K/UL — SIGNIFICANT CHANGE UP (ref 3.8–10.5)
WBC # FLD AUTO: 8.78 K/UL — SIGNIFICANT CHANGE UP (ref 3.8–10.5)
WBC # FLD AUTO: 9.95 K/UL — SIGNIFICANT CHANGE UP (ref 3.8–10.5)

## 2019-12-25 PROCEDURE — 73560 X-RAY EXAM OF KNEE 1 OR 2: CPT | Mod: 26,50

## 2019-12-25 PROCEDURE — 99233 SBSQ HOSP IP/OBS HIGH 50: CPT

## 2019-12-25 RX ORDER — SODIUM CHLORIDE 9 MG/ML
1000 INJECTION, SOLUTION INTRAVENOUS
Refills: 0 | Status: DISCONTINUED | OUTPATIENT
Start: 2019-12-25 | End: 2020-01-01

## 2019-12-25 RX ORDER — ATORVASTATIN CALCIUM 80 MG/1
20 TABLET, FILM COATED ORAL AT BEDTIME
Refills: 0 | Status: DISCONTINUED | OUTPATIENT
Start: 2019-12-25 | End: 2020-01-02

## 2019-12-25 RX ORDER — RANOLAZINE 500 MG/1
500 TABLET, FILM COATED, EXTENDED RELEASE ORAL DAILY
Refills: 0 | Status: DISCONTINUED | OUTPATIENT
Start: 2019-12-25 | End: 2020-01-02

## 2019-12-25 RX ORDER — ISOSORBIDE MONONITRATE 60 MG/1
30 TABLET, EXTENDED RELEASE ORAL DAILY
Refills: 0 | Status: DISCONTINUED | OUTPATIENT
Start: 2019-12-25 | End: 2020-01-02

## 2019-12-25 RX ORDER — METOPROLOL TARTRATE 50 MG
25 TABLET ORAL DAILY
Refills: 0 | Status: DISCONTINUED | OUTPATIENT
Start: 2019-12-25 | End: 2020-01-02

## 2019-12-25 RX ORDER — DEXTROSE 50 % IN WATER 50 %
25 SYRINGE (ML) INTRAVENOUS ONCE
Refills: 0 | Status: DISCONTINUED | OUTPATIENT
Start: 2019-12-25 | End: 2020-01-02

## 2019-12-25 RX ORDER — DEXTROSE 50 % IN WATER 50 %
15 SYRINGE (ML) INTRAVENOUS ONCE
Refills: 0 | Status: DISCONTINUED | OUTPATIENT
Start: 2019-12-25 | End: 2020-01-02

## 2019-12-25 RX ORDER — HEPARIN SODIUM 5000 [USP'U]/ML
8000 INJECTION INTRAVENOUS; SUBCUTANEOUS EVERY 6 HOURS
Refills: 0 | Status: DISCONTINUED | OUTPATIENT
Start: 2019-12-25 | End: 2020-01-01

## 2019-12-25 RX ORDER — ASPIRIN/CALCIUM CARB/MAGNESIUM 324 MG
81 TABLET ORAL DAILY
Refills: 0 | Status: DISCONTINUED | OUTPATIENT
Start: 2019-12-25 | End: 2020-01-02

## 2019-12-25 RX ORDER — FUROSEMIDE 40 MG
40 TABLET ORAL
Refills: 0 | Status: DISCONTINUED | OUTPATIENT
Start: 2019-12-25 | End: 2019-12-28

## 2019-12-25 RX ORDER — HEPARIN SODIUM 5000 [USP'U]/ML
4000 INJECTION INTRAVENOUS; SUBCUTANEOUS EVERY 6 HOURS
Refills: 0 | Status: DISCONTINUED | OUTPATIENT
Start: 2019-12-25 | End: 2020-01-01

## 2019-12-25 RX ORDER — IPRATROPIUM/ALBUTEROL SULFATE 18-103MCG
3 AEROSOL WITH ADAPTER (GRAM) INHALATION EVERY 6 HOURS
Refills: 0 | Status: DISCONTINUED | OUTPATIENT
Start: 2019-12-25 | End: 2019-12-27

## 2019-12-25 RX ORDER — INSULIN LISPRO 100/ML
VIAL (ML) SUBCUTANEOUS
Refills: 0 | Status: DISCONTINUED | OUTPATIENT
Start: 2019-12-25 | End: 2019-12-30

## 2019-12-25 RX ORDER — LOSARTAN POTASSIUM 100 MG/1
100 TABLET, FILM COATED ORAL DAILY
Refills: 0 | Status: DISCONTINUED | OUTPATIENT
Start: 2019-12-25 | End: 2020-01-02

## 2019-12-25 RX ORDER — DEXTROSE 50 % IN WATER 50 %
12.5 SYRINGE (ML) INTRAVENOUS ONCE
Refills: 0 | Status: DISCONTINUED | OUTPATIENT
Start: 2019-12-25 | End: 2020-01-02

## 2019-12-25 RX ORDER — POLYETHYLENE GLYCOL 3350 17 G/17G
17 POWDER, FOR SOLUTION ORAL DAILY
Refills: 0 | Status: DISCONTINUED | OUTPATIENT
Start: 2019-12-25 | End: 2020-01-02

## 2019-12-25 RX ORDER — HEPARIN SODIUM 5000 [USP'U]/ML
INJECTION INTRAVENOUS; SUBCUTANEOUS
Qty: 25000 | Refills: 0 | Status: DISCONTINUED | OUTPATIENT
Start: 2019-12-25 | End: 2020-01-01

## 2019-12-25 RX ORDER — PANTOPRAZOLE SODIUM 20 MG/1
40 TABLET, DELAYED RELEASE ORAL
Refills: 0 | Status: DISCONTINUED | OUTPATIENT
Start: 2019-12-25 | End: 2020-01-02

## 2019-12-25 RX ORDER — INSULIN LISPRO 100/ML
VIAL (ML) SUBCUTANEOUS AT BEDTIME
Refills: 0 | Status: DISCONTINUED | OUTPATIENT
Start: 2019-12-25 | End: 2019-12-30

## 2019-12-25 RX ORDER — GLUCAGON INJECTION, SOLUTION 0.5 MG/.1ML
1 INJECTION, SOLUTION SUBCUTANEOUS ONCE
Refills: 0 | Status: DISCONTINUED | OUTPATIENT
Start: 2019-12-25 | End: 2020-01-01

## 2019-12-25 RX ORDER — HEPARIN SODIUM 5000 [USP'U]/ML
8000 INJECTION INTRAVENOUS; SUBCUTANEOUS ONCE
Refills: 0 | Status: COMPLETED | OUTPATIENT
Start: 2019-12-25 | End: 2019-12-25

## 2019-12-25 RX ADMIN — Medication 40 MILLIGRAM(S): at 18:06

## 2019-12-25 RX ADMIN — Medication 3 MILLILITER(S): at 21:57

## 2019-12-25 RX ADMIN — PANTOPRAZOLE SODIUM 40 MILLIGRAM(S): 20 TABLET, DELAYED RELEASE ORAL at 06:29

## 2019-12-25 RX ADMIN — HEPARIN SODIUM 1800 UNIT(S)/HR: 5000 INJECTION INTRAVENOUS; SUBCUTANEOUS at 14:13

## 2019-12-25 RX ADMIN — Medication 3 MILLILITER(S): at 16:08

## 2019-12-25 RX ADMIN — HEPARIN SODIUM 8000 UNIT(S): 5000 INJECTION INTRAVENOUS; SUBCUTANEOUS at 14:12

## 2019-12-25 RX ADMIN — HEPARIN SODIUM 0 UNIT(S)/HR: 5000 INJECTION INTRAVENOUS; SUBCUTANEOUS at 20:42

## 2019-12-25 RX ADMIN — Medication 40 MILLIGRAM(S): at 05:19

## 2019-12-25 RX ADMIN — Medication 25 MILLIGRAM(S): at 06:29

## 2019-12-25 RX ADMIN — ISOSORBIDE MONONITRATE 30 MILLIGRAM(S): 60 TABLET, EXTENDED RELEASE ORAL at 12:47

## 2019-12-25 RX ADMIN — Medication 81 MILLIGRAM(S): at 12:47

## 2019-12-25 RX ADMIN — ATORVASTATIN CALCIUM 20 MILLIGRAM(S): 80 TABLET, FILM COATED ORAL at 20:43

## 2019-12-25 RX ADMIN — POLYETHYLENE GLYCOL 3350 17 GRAM(S): 17 POWDER, FOR SOLUTION ORAL at 12:47

## 2019-12-25 RX ADMIN — Medication 2: at 18:06

## 2019-12-25 RX ADMIN — Medication 1: at 12:49

## 2019-12-25 RX ADMIN — LOSARTAN POTASSIUM 100 MILLIGRAM(S): 100 TABLET, FILM COATED ORAL at 06:29

## 2019-12-25 RX ADMIN — HEPARIN SODIUM 1500 UNIT(S)/HR: 5000 INJECTION INTRAVENOUS; SUBCUTANEOUS at 22:05

## 2019-12-25 RX ADMIN — Medication 1: at 08:55

## 2019-12-25 RX ADMIN — RANOLAZINE 500 MILLIGRAM(S): 500 TABLET, FILM COATED, EXTENDED RELEASE ORAL at 12:47

## 2019-12-25 NOTE — PROVIDER CONTACT NOTE (CRITICAL VALUE NOTIFICATION) - ASSESSMENT
patient AxOx4, asymptomatic, denies chest pain, SOB, n/v/dizziness. No s/s of bleeding noted. Abdomen distended, audible and active in all quadrants, no pain noted. No bowel movements at this time/no bloody stools verbalized as per patient. Heparin drip paused at this time as per heparin nomogram, will restart in 1 hour at new rate of 15 mL/hr as per heparin nomogram.

## 2019-12-25 NOTE — PROGRESS NOTE ADULT - PROBLEM SELECTOR PLAN 1
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 accuracy   - continue lasix 40 mg IVP BID for now  -Monitor on tele  - TTE ordered  - Strict I&Os, daily weights  - Cardiology consulted, follows with Dr. Gonzalez as outpatient

## 2019-12-25 NOTE — PROGRESS NOTE ADULT - PROBLEM SELECTOR PLAN 2
Atypical. Currently, chest pain free. EKG without any acute ischemic changes.  -Monitor on tele  -Troponin 26-->29. Trend Cardiac enzymes.   -TTE ordered  -Cardiology consulted

## 2019-12-25 NOTE — CONSULT NOTE ADULT - SUBJECTIVE AND OBJECTIVE BOX
Micky Simon MD  Interventional Cardiology / Advance Heart Failure and Cardiac Transplant Specialist  [place] Office : [address] [place] [address] [place] [address]  Tel:   [place] Office : [address] [facility] [place] [address]  Tel: [phone]  Cell : [phone]      HISTORY OF PRESENTING ILLNESS:  HPI:  65 y/o M with PMH of CHF, CAD, Type 2 Diabetes, HTN, denies hx of Afib CHADVASC score of 5 presents to the ED with 3 days of worsening chest pain and also with Dyspnea of exertion and shortness of breath. Patient describes pain as intermittent and states that yesterday it has been lasting longer. Patient describes pain as being punched in chest and states that pain started on the left sided chest and radiated to right side of chest. Patient states that yesterday pain was 8/10 but now pain is very mild. Patient states that shortness of breath has been becoming worse. Patient admits to feeling short of breath when sitting and walking and states that he is unable to lay flat and has to sleep with several pillows. Patient also admits to having swollen abdomen and states that he has feeling bloated lately. Patient admits to having constipation, knee pain and occasional right arm numbness which occurs when lifting arm and endorses epigastric pain. Patient denies fever, chills. Spoke to pts daughter , was on coumadin for LV clot was taken off a few months ago, no h/o bleeding issues    PAST MEDICAL & SURGICAL HISTORY:  Heart problem  Hyperlipidemia  Hypertension  No significant past surgical history      SOCIAL HISTORY: Substance Use (street drugs): ( x ) never used  (  ) other:    FAMILY HISTORY:  No pertinent family history in first degree relatives      REVIEW OF SYSTEMS:  CONSTITUTIONAL: No fever, weight loss, or fatigue  EYES: No eye pain, visual disturbances, or discharge  ENMT:  No difficulty hearing, tinnitus, vertigo; No sinus or throat pain  BREASTS: No pain, masses, or nipple discharge  GASTROINTESTINAL: No abdominal or epigastric pain. No nausea, vomiting, or hematemesis; No diarrhea or constipation. No melena or hematochezia.  GENITOURINARY: No dysuria, frequency, hematuria, or incontinence  NEUROLOGICAL: No headaches, memory loss, loss of strength, numbness, or tremors  ENDOCRINE: No heat or cold intolerance; No hair loss  MUSCULOSKELETAL: No joint pain or swelling; No muscle, back, or extremity pain  PSYCHIATRIC: No depression, anxiety, mood swings, or difficulty sleeping  HEME/LYMPH: No easy bruising, or bleeding gums  All others negative    MEDICATIONS:  aspirin enteric coated 81 milliGRAM(s) Oral daily  furosemide   Injectable 40 milliGRAM(s) IV Push two times a day  heparin  Infusion.  Unit(s)/Hr IV Continuous <Continuous>  heparin  Injectable 8000 Unit(s) IV Push once  heparin  Injectable 8000 Unit(s) IV Push every 6 hours PRN  heparin  Injectable 4000 Unit(s) IV Push every 6 hours PRN  isosorbide   mononitrate ER Tablet (IMDUR) 30 milliGRAM(s) Oral daily  losartan 100 milliGRAM(s) Oral daily  metoprolol succinate ER 25 milliGRAM(s) Oral daily  ranolazine 500 milliGRAM(s) Oral daily      albuterol/ipratropium for Nebulization 3 milliLiter(s) Nebulizer every 6 hours      pantoprazole    Tablet 40 milliGRAM(s) Oral before breakfast  polyethylene glycol 3350 17 Gram(s) Oral daily    atorvastatin 20 milliGRAM(s) Oral at bedtime  dextrose 40% Gel 15 Gram(s) Oral once PRN  dextrose 50% Injectable 12.5 Gram(s) IV Push once  dextrose 50% Injectable 25 Gram(s) IV Push once  dextrose 50% Injectable 25 Gram(s) IV Push once  glucagon  Injectable 1 milliGRAM(s) IntraMuscular once PRN  insulin lispro (HumaLOG) corrective regimen sliding scale   SubCutaneous three times a day before meals  insulin lispro (HumaLOG) corrective regimen sliding scale   SubCutaneous at bedtime    dextrose 5%. 1000 milliLiter(s) IV Continuous <Continuous>      FAMILY HISTORY:  No pertinent family history in first degree relatives        Allergies    No Known Allergies    Intolerances    	      PHYSICAL EXAM:  T(C): 37.2 (12-25-19 @ 12:40), Max: 37.2 (12-25-19 @ 12:40)  HR: 65 (12-25-19 @ 12:40) (65 - 97)  BP: 123/64 (12-25-19 @ 12:40) (123/64 - 160/99)  RR: 17 (12-25-19 @ 12:40) (17 - 20)  SpO2: 97% (12-25-19 @ 12:40) (97% - 98%)  Wt(kg): --  I&O's Summary    24 Dec 2019 07:01  -  25 Dec 2019 07:00  --------------------------------------------------------  IN: 0 mL / OUT: 400 mL / NET: -400 mL    25 Dec 2019 07:01  -  25 Dec 2019 14:00  --------------------------------------------------------  IN: 240 mL / OUT: 400 mL / NET: -160 mL          EYES: EOMI, PERRLA, conjunctiva and sclera clear  ENMT: No tonsillar erythema, exudates, or enlargement; Moist mucous membranes, Good dentition, No lesions  Cardiovascular: Normal S1 S2, No JVD, No murmurs, No edema, irregularly irregular  Respiratory: Lungs clear to auscultation	  Gastrointestinal:  Soft, Non-tender, + BS	  Extremities: Normal range of motion, No clubbing, cyanosis or edema    LABS:	 	    CARDIAC MARKERS:                                  13.5   8.78  )-----------( 180      ( 25 Dec 2019 05:40 )             42.9     12-25    137  |  99  |  20  ----------------------------<  190<H>  4.4   |  26  |  1.08    Ca    9.5      25 Dec 2019 05:40  Phos  3.7     12-25  Mg     1.7     12-25    TPro  6.5  /  Alb  3.8  /  TBili  0.6  /  DBili  x   /  AST  22  /  ALT  23  /  AlkPhos  72  12-24    proBNP: Serum Pro-Brain Natriuretic Peptide: 1696 pg/mL (12-24 @ 22:07)  Serum Pro-Brain Natriuretic Peptide: 1566 pg/mL (12-24 @ 20:08)    Lipid Profile:   HgA1c: Hemoglobin A1C, Whole Blood: 8.6 % (12-25 @ 05:40)    TSH: Thyroid Stimulating Hormone, Serum: 0.99 uIU/mL (12-25 @ 05:40)      Consultant(s) Notes Reviewed:  [x ] YES  [ ] NO    Care Discussed with Consultants/Other Providers [ x] YES  [ ] NO    Imaging Personally Reviewed independently:  [x] YES  [ ] NO    All labs, radiologic studies, vitals, orders and medications list reviewed. Patient is seen and examined at bedside. Case discussed with medical team.    ASSESSMENT/PLAN:

## 2019-12-25 NOTE — PROGRESS NOTE ADULT - PROBLEM SELECTOR PLAN 10
DVT pps: Will hold off on heparin prophylaxis until reason for being off AC is determined.    Transitions of Care Status:  1.  Name of PCP: ?Dr. Gonzalez  2.  PCP Contacted on Admission: [ ] Y    [x] N    3.  PCP contacted at Discharge: [ ] Y    [ ] N    [ ] N/A  4.  Post-Discharge Appointment Date and Location:  5.  Summary of Handoff given to PCP:

## 2019-12-25 NOTE — PROGRESS NOTE ADULT - PROBLEM SELECTOR PLAN 3
Unclear if pt with h/o afib. Pt denies h/o afib. But EKG on admission with ?afib  History of Left ventricular thrombus  -Pt states Dr. Gonzalez took him off anticoagulation 6 months ago. Will hold off on anticoagulation until reason for not being on it determined. CHADVASC score of 5. -Continue Metoprolol succinate 25 mg daily. Currently rate controlled  - TTE ordered  - monitor on tele Unclear if pt with h/o afib. Pt denies, EKG on admission with suspected Afib, CHADVASC score of 5  History of Left ventricular thrombus  Pt states Dr. Gonzalez took him off anticoagulation 6 months ago.    - continue Metoprolol succinate 25 mg daily. Currently rate controlled  - start heparin drip for now  - TTE ordered  - monitor on tele Unclear if pt with h/o afib. Pt denies, EKG on admission with suspected Afib, CHADVASC score of 5  History of Left ventricular thrombus  Pt states Dr. Gonzalez took him off anticoagulation 6 months ago.    - continue Metoprolol succinate 25 mg daily. Currently rate controlled  - will discuss with Cardiology about starting AC with heparin drip   - TTE ordered  - monitor on tele

## 2019-12-25 NOTE — PROGRESS NOTE ADULT - PROBLEM SELECTOR PLAN 9
-Full ROM on exam with no S/S of infection. Likely osteoarthritis given obesity. Continue to monitor. Bilateral knee X-ray ordered.    #Rt shoulder pain:  XR of Rt shoulder, CT cervical spine, suspected cervical radiculopathy -Full ROM on exam with no S/S of infection. Likely osteoarthritis given obesity. Continue to monitor. Bilateral knee X-ray reviewed, WNL    #Rt shoulder pain:  XR of Rt shoulder, cervical spine, suspected cervical radiculopathy

## 2019-12-25 NOTE — CONSULT NOTE ADULT - ASSESSMENT
EKG - Afib PVC @ 82 bpm    Echo  2017  1. Mitral annular calcification, otherwise normal mitral  valve. Moderate-severe mitral regurgitation.  2. Calcified trileaflet aortic valve with decreased  opening. Peak transaortic valve gradient equals 27 mm Hg,  mean transaortic valve gradient equals 20 mm Hg, consistent  with probable mild to moderate aortic stenosis.  3. Severe left ventricular enlargement.  4. Severe segmental left ventricular systolic dysfunction.  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  Thrombus is  visualized within the aneurysm cavity.  Endocardial  visualization enhanced with intravenous injection of echo  contrast (Definity).  5. Unable to accurately evaluate right ventricular size or  systolic function.    Cath 2017   VENTRICLES: Global left ventricular function was moderately depressed. EF  estimated was 35 %. There was a large aneurysm involving the posterobasal wall  with evidence of calcification and likely thrombus.  VALVES: MITRAL VALVE: The mitral valve was evaluated by left ventriculography.  The mitral valve exhibited moderate to severe regurgitation.  CORONARY VESSELS: The coronary circulation is right dominant.  LM:   --  LM: Angiography showed minor luminal irregularities with no flow  limiting lesions.  --  Distal left main: There was a discrete 20 % stenosis.  LAD:   --  Proximal LAD: There was a discrete 40 % stenosis.  --  Mid LAD: There was a tubular 70 % stenosis. There was ZAYRA grade 3 flow  through the vessel (brisk flow). --  D1: Angiography showed minor luminal  irregularities with no flow limiting lesions.  CX:   --  Circumflex: Angiography showed minor luminal irregularities with no  flow limiting lesions.  RCA:   --  Proximal RCA: There was a 100 % stenosis. There was ZAYRA grade 0  flow through the vessel (no flow).    a/p     1) [place] Atrial fibrillation - Chads Vasc score 5 start on IV heparin, will get 2d echo to access LV function, consider ischemia eval likely CP sec to new afib    2) LV aneurysm and thrombus - will get echo with definity to r/o LV thrombus     3) Acute on chronic CHF - cont lasix IV

## 2019-12-26 DIAGNOSIS — I50.23 ACUTE ON CHRONIC SYSTOLIC (CONGESTIVE) HEART FAILURE: ICD-10-CM

## 2019-12-26 LAB
APTT BLD: 100.1 SEC — HIGH (ref 27.5–36.3)
APTT BLD: 74.5 SEC — HIGH (ref 27.5–36.3)
APTT BLD: 74.6 SEC — HIGH (ref 27.5–36.3)
B PERT DNA SPEC QL NAA+PROBE: NOT DETECTED — SIGNIFICANT CHANGE UP
C PNEUM DNA SPEC QL NAA+PROBE: NOT DETECTED — SIGNIFICANT CHANGE UP
FLUAV H1 2009 PAND RNA SPEC QL NAA+PROBE: NOT DETECTED — SIGNIFICANT CHANGE UP
FLUAV H1 RNA SPEC QL NAA+PROBE: NOT DETECTED — SIGNIFICANT CHANGE UP
FLUAV H3 RNA SPEC QL NAA+PROBE: NOT DETECTED — SIGNIFICANT CHANGE UP
FLUAV SUBTYP SPEC NAA+PROBE: NOT DETECTED — SIGNIFICANT CHANGE UP
FLUBV RNA SPEC QL NAA+PROBE: NOT DETECTED — SIGNIFICANT CHANGE UP
GLUCOSE BLDC GLUCOMTR-MCNC: 199 MG/DL — HIGH (ref 70–99)
HADV DNA SPEC QL NAA+PROBE: NOT DETECTED — SIGNIFICANT CHANGE UP
HBA1C BLD-MCNC: 8.4 % — HIGH (ref 4–5.6)
HCOV PNL SPEC NAA+PROBE: SIGNIFICANT CHANGE UP
HCT VFR BLD CALC: 41.7 % — SIGNIFICANT CHANGE UP (ref 39–50)
HCV AB S/CO SERPL IA: 0.13 S/CO — SIGNIFICANT CHANGE UP (ref 0–0.99)
HCV AB SERPL-IMP: SIGNIFICANT CHANGE UP
HGB BLD-MCNC: 13.1 G/DL — SIGNIFICANT CHANGE UP (ref 13–17)
HMPV RNA SPEC QL NAA+PROBE: NOT DETECTED — SIGNIFICANT CHANGE UP
HPIV1 RNA SPEC QL NAA+PROBE: NOT DETECTED — SIGNIFICANT CHANGE UP
HPIV2 RNA SPEC QL NAA+PROBE: NOT DETECTED — SIGNIFICANT CHANGE UP
HPIV3 RNA SPEC QL NAA+PROBE: NOT DETECTED — SIGNIFICANT CHANGE UP
HPIV4 RNA SPEC QL NAA+PROBE: NOT DETECTED — SIGNIFICANT CHANGE UP
MCHC RBC-ENTMCNC: 28.9 PG — SIGNIFICANT CHANGE UP (ref 27–34)
MCHC RBC-ENTMCNC: 31.4 % — LOW (ref 32–36)
MCV RBC AUTO: 92.1 FL — SIGNIFICANT CHANGE UP (ref 80–100)
NRBC # FLD: 0 K/UL — SIGNIFICANT CHANGE UP (ref 0–0)
NT-PROBNP SERPL-SCNC: 1066 PG/ML — SIGNIFICANT CHANGE UP
PLATELET # BLD AUTO: 166 K/UL — SIGNIFICANT CHANGE UP (ref 150–400)
PMV BLD: 11.4 FL — SIGNIFICANT CHANGE UP (ref 7–13)
RBC # BLD: 4.53 M/UL — SIGNIFICANT CHANGE UP (ref 4.2–5.8)
RBC # FLD: 13.4 % — SIGNIFICANT CHANGE UP (ref 10.3–14.5)
RSV RNA SPEC QL NAA+PROBE: NOT DETECTED — SIGNIFICANT CHANGE UP
RV+EV RNA SPEC QL NAA+PROBE: NOT DETECTED — SIGNIFICANT CHANGE UP
WBC # BLD: 10.24 K/UL — SIGNIFICANT CHANGE UP (ref 3.8–10.5)
WBC # FLD AUTO: 10.24 K/UL — SIGNIFICANT CHANGE UP (ref 3.8–10.5)

## 2019-12-26 PROCEDURE — 99233 SBSQ HOSP IP/OBS HIGH 50: CPT

## 2019-12-26 RX ORDER — BENZOCAINE AND MENTHOL 5; 1 G/100ML; G/100ML
1 LIQUID ORAL
Refills: 0 | Status: COMPLETED | OUTPATIENT
Start: 2019-12-26 | End: 2019-12-28

## 2019-12-26 RX ADMIN — LOSARTAN POTASSIUM 100 MILLIGRAM(S): 100 TABLET, FILM COATED ORAL at 06:01

## 2019-12-26 RX ADMIN — Medication 40 MILLIGRAM(S): at 06:00

## 2019-12-26 RX ADMIN — BENZOCAINE AND MENTHOL 1 LOZENGE: 5; 1 LIQUID ORAL at 12:24

## 2019-12-26 RX ADMIN — HEPARIN SODIUM 1300 UNIT(S)/HR: 5000 INJECTION INTRAVENOUS; SUBCUTANEOUS at 20:35

## 2019-12-26 RX ADMIN — Medication 1: at 08:54

## 2019-12-26 RX ADMIN — ISOSORBIDE MONONITRATE 30 MILLIGRAM(S): 60 TABLET, EXTENDED RELEASE ORAL at 12:23

## 2019-12-26 RX ADMIN — Medication 3 MILLILITER(S): at 23:49

## 2019-12-26 RX ADMIN — Medication 2: at 18:04

## 2019-12-26 RX ADMIN — Medication 3 MILLILITER(S): at 03:51

## 2019-12-26 RX ADMIN — Medication 81 MILLIGRAM(S): at 12:23

## 2019-12-26 RX ADMIN — Medication 3 MILLILITER(S): at 10:02

## 2019-12-26 RX ADMIN — Medication 2: at 12:41

## 2019-12-26 RX ADMIN — HEPARIN SODIUM 1300 UNIT(S)/HR: 5000 INJECTION INTRAVENOUS; SUBCUTANEOUS at 13:25

## 2019-12-26 RX ADMIN — Medication 3 MILLILITER(S): at 15:58

## 2019-12-26 RX ADMIN — HEPARIN SODIUM 1300 UNIT(S)/HR: 5000 INJECTION INTRAVENOUS; SUBCUTANEOUS at 06:00

## 2019-12-26 RX ADMIN — Medication 25 MILLIGRAM(S): at 06:01

## 2019-12-26 RX ADMIN — Medication 40 MILLIGRAM(S): at 18:03

## 2019-12-26 RX ADMIN — RANOLAZINE 500 MILLIGRAM(S): 500 TABLET, FILM COATED, EXTENDED RELEASE ORAL at 12:22

## 2019-12-26 RX ADMIN — BENZOCAINE AND MENTHOL 1 LOZENGE: 5; 1 LIQUID ORAL at 18:09

## 2019-12-26 RX ADMIN — BENZOCAINE AND MENTHOL 1 LOZENGE: 5; 1 LIQUID ORAL at 23:41

## 2019-12-26 RX ADMIN — ATORVASTATIN CALCIUM 20 MILLIGRAM(S): 80 TABLET, FILM COATED ORAL at 21:58

## 2019-12-26 RX ADMIN — PANTOPRAZOLE SODIUM 40 MILLIGRAM(S): 20 TABLET, DELAYED RELEASE ORAL at 06:01

## 2019-12-26 NOTE — DIETITIAN INITIAL EVALUATION ADULT. - ADD RECOMMEND
1- Continue current diet order, which remains appropriate at this time. 2- Monitor weights, labs, BM's, skin integrity, p.o. intake. 3- Please Encourage po intake, assist with meals and menu selections, provide alternatives PRN.

## 2019-12-26 NOTE — PROGRESS NOTE ADULT - PROBLEM SELECTOR PLAN 3
Unclear if pt with h/o afib. Pt denies, EKG on admission with suspected Afib, CHADVASC score of 5  History of Left ventricular thrombus  Pt states Dr. Gonzalez took him off anticoagulation 6 months ago.    - c/w Hep gtt pending for now pendign TTE  - monitor on tele

## 2019-12-26 NOTE — DIETITIAN INITIAL EVALUATION ADULT. - OTHER INFO
Nutrition consult received for RD-heart failure linked order set.  Pt is a 65 y/o M with PMH of CHF, CAD, HTN, Type 2 Diabetes, presents to the ED with 3 days of worsening chest pain and also with Dyspnea of exertion and shortness of breath. Pt reports good PO intake and appetite at this time as well as prior to admission.  No GI distress (nausea/vomiting/diarrhea/constipation.) No reported difficulties chewing and swallowing foods and fluids.  Pt endorses recent weight loss on this admission which he attributed to fluid losses. Stated wt prior to admission is 101kgs ---> current is 97.8kgs.  Furthermore, patient states that he does not add salt to foods at home and adheres to the recommended diet.  Additional review provided.

## 2019-12-26 NOTE — PROGRESS NOTE ADULT - PROBLEM SELECTOR PLAN 2
Atypical. Currently, chest pain free. EKG without any acute ischemic changes.  -Monitor on tele  - Decision regarding ischemic eval is pending CATH

## 2019-12-26 NOTE — PROGRESS NOTE ADULT - PROBLEM SELECTOR PLAN 9
-Full ROM on exam with no S/S of infection. Likely osteoarthritis given obesity. Continue to monitor. Bilateral knee X-ray reviewed, WNL  #Rt shoulder pain:  XR of Rt shoulder, cervical spine, suspected cervical radiculopathy

## 2019-12-26 NOTE — PROVIDER CONTACT NOTE (OTHER) - ASSESSMENT
patient Axox4, patient had mild nose bleed at this time, mild bleeding noted on gauze, bleeding subsided. No other s/s of bleeding noted. Heparin drip continued as ordered at new rate of 15 mL/hr, aPTT due approx at 03:36. Safety maintained and will continue to monitor.

## 2019-12-26 NOTE — PROVIDER CONTACT NOTE (OTHER) - ASSESSMENT
patient AxOx4, asymptomatic, Afib on tele monitor, continuous pulse ox maintained on RA. no acute distress noted. denies chest pain, SOB, n/v/dizziness. Heparin drip continued as per order. safety and bleeding precautions maintained. will continue to monitor

## 2019-12-26 NOTE — DIETITIAN INITIAL EVALUATION ADULT. - PERTINENT MEDS FT
MEDICATIONS  (STANDING):  albuterol/ipratropium for Nebulization 3 milliLiter(s) Nebulizer every 6 hours  aspirin enteric coated 81 milliGRAM(s) Oral daily  atorvastatin 20 milliGRAM(s) Oral at bedtime  benzocaine 15 mG/menthol 3.6 mG (Sugar-Free) Lozenge 1 Lozenge Oral four times a day  dextrose 5%. 1000 milliLiter(s) (50 mL/Hr) IV Continuous <Continuous>  dextrose 50% Injectable 12.5 Gram(s) IV Push once  dextrose 50% Injectable 25 Gram(s) IV Push once  dextrose 50% Injectable 25 Gram(s) IV Push once  furosemide   Injectable 40 milliGRAM(s) IV Push two times a day  heparin  Infusion.  Unit(s)/Hr (18 mL/Hr) IV Continuous <Continuous>  insulin lispro (HumaLOG) corrective regimen sliding scale   SubCutaneous three times a day before meals  insulin lispro (HumaLOG) corrective regimen sliding scale   SubCutaneous at bedtime  isosorbide   mononitrate ER Tablet (IMDUR) 30 milliGRAM(s) Oral daily  losartan 100 milliGRAM(s) Oral daily  metoprolol succinate ER 25 milliGRAM(s) Oral daily  pantoprazole    Tablet 40 milliGRAM(s) Oral before breakfast  polyethylene glycol 3350 17 Gram(s) Oral daily  ranolazine 500 milliGRAM(s) Oral daily

## 2019-12-26 NOTE — DIETITIAN INITIAL EVALUATION ADULT. - PERTINENT LABORATORY DATA
12-25 Na137 mmol/L Glu 190 mg/dL<H> K+ 4.4 mmol/L Cr  1.08 mg/dL BUN 20 mg/dL 12-25 Phos 3.7 mg/dL 12-24 Alb 3.8 g/dL 12-26 [id number] 8.4 %<H> 12-25 Chol 141 mg/dL LDL 79 mg/dL HDL 39 mg/dL Trig 138 mg/dL

## 2019-12-26 NOTE — PROGRESS NOTE ADULT - PROBLEM SELECTOR PLAN 4
20-40 pack year smoking history 20 yrs ago, no previous PFT's  -c/w duoneb,  will add cepacol for sore throat and check RVP  - OP F/U with Pulm

## 2019-12-26 NOTE — PROGRESS NOTE ADULT - PROBLEM SELECTOR PLAN 6
-C/w Losartan and Metoprolol  - consider increasing Imdur to 60mg If BP still mildly elevated and it's ok with Cards

## 2019-12-26 NOTE — PROGRESS NOTE ADULT - PROBLEM SELECTOR PLAN 1
- c/w lasix 40mg BID  - Net neg 1.7L with improving weight  - Awaiting repeat TTE, results will determine the need for CATH  - Last TTE 2017 with severe segmental left ventricular systolic dysfunction with aneurysm  - c/w Tele  - Strict I&Os, daily weights  - Cardiology consulted, follows with Dr. Gonzalez as outpatient

## 2019-12-27 LAB
ANION GAP SERPL CALC-SCNC: 16 MMO/L — HIGH (ref 7–14)
APTT BLD: 101.3 SEC — HIGH (ref 27.5–36.3)
APTT BLD: 77.8 SEC — HIGH (ref 27.5–36.3)
APTT BLD: 80.4 SEC — HIGH (ref 27.5–36.3)
BUN SERPL-MCNC: 31 MG/DL — HIGH (ref 7–23)
CALCIUM SERPL-MCNC: 10.1 MG/DL — SIGNIFICANT CHANGE UP (ref 8.4–10.5)
CHLORIDE SERPL-SCNC: 100 MMOL/L — SIGNIFICANT CHANGE UP (ref 98–107)
CO2 SERPL-SCNC: 26 MMOL/L — SIGNIFICANT CHANGE UP (ref 22–31)
CREAT SERPL-MCNC: 1.23 MG/DL — SIGNIFICANT CHANGE UP (ref 0.5–1.3)
GLUCOSE SERPL-MCNC: 224 MG/DL — HIGH (ref 70–99)
HCT VFR BLD CALC: 45.1 % — SIGNIFICANT CHANGE UP (ref 39–50)
HGB BLD-MCNC: 14.6 G/DL — SIGNIFICANT CHANGE UP (ref 13–17)
INR BLD: 1.17 — SIGNIFICANT CHANGE UP (ref 0.88–1.17)
MAGNESIUM SERPL-MCNC: 1.8 MG/DL — SIGNIFICANT CHANGE UP (ref 1.6–2.6)
MCHC RBC-ENTMCNC: 29.7 PG — SIGNIFICANT CHANGE UP (ref 27–34)
MCHC RBC-ENTMCNC: 32.4 % — SIGNIFICANT CHANGE UP (ref 32–36)
MCV RBC AUTO: 91.9 FL — SIGNIFICANT CHANGE UP (ref 80–100)
NRBC # FLD: 0 K/UL — SIGNIFICANT CHANGE UP (ref 0–0)
PLATELET # BLD AUTO: 194 K/UL — SIGNIFICANT CHANGE UP (ref 150–400)
PMV BLD: 11.6 FL — SIGNIFICANT CHANGE UP (ref 7–13)
POTASSIUM SERPL-MCNC: 3.9 MMOL/L — SIGNIFICANT CHANGE UP (ref 3.5–5.3)
POTASSIUM SERPL-SCNC: 3.9 MMOL/L — SIGNIFICANT CHANGE UP (ref 3.5–5.3)
PROTHROM AB SERPL-ACNC: 13.4 SEC — HIGH (ref 9.8–13.1)
RBC # BLD: 4.91 M/UL — SIGNIFICANT CHANGE UP (ref 4.2–5.8)
RBC # FLD: 13.4 % — SIGNIFICANT CHANGE UP (ref 10.3–14.5)
SODIUM SERPL-SCNC: 142 MMOL/L — SIGNIFICANT CHANGE UP (ref 135–145)
WBC # BLD: 9.01 K/UL — SIGNIFICANT CHANGE UP (ref 3.8–10.5)
WBC # FLD AUTO: 9.01 K/UL — SIGNIFICANT CHANGE UP (ref 3.8–10.5)

## 2019-12-27 PROCEDURE — 93306 TTE W/DOPPLER COMPLETE: CPT | Mod: 26

## 2019-12-27 PROCEDURE — 72040 X-RAY EXAM NECK SPINE 2-3 VW: CPT | Mod: 26

## 2019-12-27 PROCEDURE — 73030 X-RAY EXAM OF SHOULDER: CPT | Mod: 26,RT

## 2019-12-27 PROCEDURE — 99233 SBSQ HOSP IP/OBS HIGH 50: CPT

## 2019-12-27 RX ORDER — WARFARIN SODIUM 2.5 MG/1
5 TABLET ORAL ONCE
Refills: 0 | Status: COMPLETED | OUTPATIENT
Start: 2019-12-27 | End: 2019-12-27

## 2019-12-27 RX ORDER — ALBUTEROL 90 UG/1
2 AEROSOL, METERED ORAL EVERY 6 HOURS
Refills: 0 | Status: DISCONTINUED | OUTPATIENT
Start: 2019-12-27 | End: 2020-01-02

## 2019-12-27 RX ADMIN — Medication 2: at 12:54

## 2019-12-27 RX ADMIN — RANOLAZINE 500 MILLIGRAM(S): 500 TABLET, FILM COATED, EXTENDED RELEASE ORAL at 11:54

## 2019-12-27 RX ADMIN — ISOSORBIDE MONONITRATE 30 MILLIGRAM(S): 60 TABLET, EXTENDED RELEASE ORAL at 11:53

## 2019-12-27 RX ADMIN — BENZOCAINE AND MENTHOL 1 LOZENGE: 5; 1 LIQUID ORAL at 11:54

## 2019-12-27 RX ADMIN — ATORVASTATIN CALCIUM 20 MILLIGRAM(S): 80 TABLET, FILM COATED ORAL at 22:06

## 2019-12-27 RX ADMIN — Medication 81 MILLIGRAM(S): at 11:53

## 2019-12-27 RX ADMIN — Medication 40 MILLIGRAM(S): at 05:13

## 2019-12-27 RX ADMIN — Medication 40 MILLIGRAM(S): at 17:09

## 2019-12-27 RX ADMIN — HEPARIN SODIUM 1100 UNIT(S)/HR: 5000 INJECTION INTRAVENOUS; SUBCUTANEOUS at 08:50

## 2019-12-27 RX ADMIN — Medication 1: at 18:13

## 2019-12-27 RX ADMIN — WARFARIN SODIUM 5 MILLIGRAM(S): 2.5 TABLET ORAL at 19:15

## 2019-12-27 RX ADMIN — BENZOCAINE AND MENTHOL 1 LOZENGE: 5; 1 LIQUID ORAL at 22:07

## 2019-12-27 RX ADMIN — Medication 2: at 08:49

## 2019-12-27 RX ADMIN — BENZOCAINE AND MENTHOL 1 LOZENGE: 5; 1 LIQUID ORAL at 17:08

## 2019-12-27 RX ADMIN — POLYETHYLENE GLYCOL 3350 17 GRAM(S): 17 POWDER, FOR SOLUTION ORAL at 11:55

## 2019-12-27 RX ADMIN — HEPARIN SODIUM 1100 UNIT(S)/HR: 5000 INJECTION INTRAVENOUS; SUBCUTANEOUS at 17:09

## 2019-12-27 RX ADMIN — BENZOCAINE AND MENTHOL 1 LOZENGE: 5; 1 LIQUID ORAL at 05:12

## 2019-12-27 RX ADMIN — Medication 3 MILLILITER(S): at 09:28

## 2019-12-27 RX ADMIN — HEPARIN SODIUM 1100 UNIT(S)/HR: 5000 INJECTION INTRAVENOUS; SUBCUTANEOUS at 23:57

## 2019-12-27 RX ADMIN — Medication 3 MILLILITER(S): at 04:14

## 2019-12-27 RX ADMIN — Medication 25 MILLIGRAM(S): at 05:12

## 2019-12-27 RX ADMIN — PANTOPRAZOLE SODIUM 40 MILLIGRAM(S): 20 TABLET, DELAYED RELEASE ORAL at 05:13

## 2019-12-27 RX ADMIN — ALBUTEROL 2 PUFF(S): 90 AEROSOL, METERED ORAL at 23:58

## 2019-12-27 RX ADMIN — LOSARTAN POTASSIUM 100 MILLIGRAM(S): 100 TABLET, FILM COATED ORAL at 05:12

## 2019-12-27 NOTE — PROGRESS NOTE ADULT - PROBLEM SELECTOR PLAN 1
- c/w lasix 40mg BID  - pt diuresing well  - Awaiting repeat TTE, results will determine the need for CATH  - Last TTE 2017 with severe segmental left ventricular systolic dysfunction with aneurysm  - c/w Tele  - Strict I&Os, daily weights  - Cardiology consulted, follows with Dr. Gonzalez as outpatient

## 2019-12-27 NOTE — PROGRESS NOTE ADULT - PROBLEM SELECTOR PLAN 2
Atypical. Currently, chest pain free. EKG without any acute ischemic changes.  -Monitor on tele  - Decision regarding ischemic eval is pending CATH report

## 2019-12-27 NOTE — PROGRESS NOTE ADULT - PROBLEM SELECTOR PLAN 3
Unclear if pt with h/o afib. Pt denies, EKG on admission with suspected Afib, CHADVASC score of 5  History of Left ventricular thrombus  Pt states Dr. Gonzalez took him off anticoagulation 6 months ago.    - c/w Hep gtt pending for now pending TTE  - monitor on tele

## 2019-12-27 NOTE — CHART NOTE - NSCHARTNOTEFT_GEN_A_CORE
Discussed echo results with Dr Simon, large LV thrombus was noted on TTE. Dr. Simon recommended starting coumadin 5 mg and bridging with heparin.  No ischemic workup or cardioversion at this time. Will continue to monitor

## 2019-12-28 LAB
ANION GAP SERPL CALC-SCNC: 15 MMO/L — HIGH (ref 7–14)
APTT BLD: 73.1 SEC — HIGH (ref 27.5–36.3)
BUN SERPL-MCNC: 42 MG/DL — HIGH (ref 7–23)
CALCIUM SERPL-MCNC: 9.4 MG/DL — SIGNIFICANT CHANGE UP (ref 8.4–10.5)
CHLORIDE SERPL-SCNC: 100 MMOL/L — SIGNIFICANT CHANGE UP (ref 98–107)
CO2 SERPL-SCNC: 25 MMOL/L — SIGNIFICANT CHANGE UP (ref 22–31)
CREAT SERPL-MCNC: 1.5 MG/DL — HIGH (ref 0.5–1.3)
GLUCOSE SERPL-MCNC: 215 MG/DL — HIGH (ref 70–99)
HCT VFR BLD CALC: 45.2 % — SIGNIFICANT CHANGE UP (ref 39–50)
HGB BLD-MCNC: 14.3 G/DL — SIGNIFICANT CHANGE UP (ref 13–17)
INR BLD: 1.19 — HIGH (ref 0.88–1.17)
MAGNESIUM SERPL-MCNC: 1.9 MG/DL — SIGNIFICANT CHANGE UP (ref 1.6–2.6)
MCHC RBC-ENTMCNC: 29.3 PG — SIGNIFICANT CHANGE UP (ref 27–34)
MCHC RBC-ENTMCNC: 31.6 % — LOW (ref 32–36)
MCV RBC AUTO: 92.6 FL — SIGNIFICANT CHANGE UP (ref 80–100)
NRBC # FLD: 0 K/UL — SIGNIFICANT CHANGE UP (ref 0–0)
PHOSPHATE SERPL-MCNC: 3.1 MG/DL — SIGNIFICANT CHANGE UP (ref 2.5–4.5)
PLATELET # BLD AUTO: 186 K/UL — SIGNIFICANT CHANGE UP (ref 150–400)
PMV BLD: 11.2 FL — SIGNIFICANT CHANGE UP (ref 7–13)
POTASSIUM SERPL-MCNC: 3.7 MMOL/L — SIGNIFICANT CHANGE UP (ref 3.5–5.3)
POTASSIUM SERPL-SCNC: 3.7 MMOL/L — SIGNIFICANT CHANGE UP (ref 3.5–5.3)
PROTHROM AB SERPL-ACNC: 13.6 SEC — HIGH (ref 9.8–13.1)
RBC # BLD: 4.88 M/UL — SIGNIFICANT CHANGE UP (ref 4.2–5.8)
RBC # FLD: 13.4 % — SIGNIFICANT CHANGE UP (ref 10.3–14.5)
SODIUM SERPL-SCNC: 140 MMOL/L — SIGNIFICANT CHANGE UP (ref 135–145)
WBC # BLD: 10 K/UL — SIGNIFICANT CHANGE UP (ref 3.8–10.5)
WBC # FLD AUTO: 10 K/UL — SIGNIFICANT CHANGE UP (ref 3.8–10.5)

## 2019-12-28 PROCEDURE — 99233 SBSQ HOSP IP/OBS HIGH 50: CPT

## 2019-12-28 RX ORDER — IPRATROPIUM/ALBUTEROL SULFATE 18-103MCG
3 AEROSOL WITH ADAPTER (GRAM) INHALATION ONCE
Refills: 0 | Status: COMPLETED | OUTPATIENT
Start: 2019-12-28 | End: 2019-12-28

## 2019-12-28 RX ORDER — FUROSEMIDE 40 MG
40 TABLET ORAL
Refills: 0 | Status: DISCONTINUED | OUTPATIENT
Start: 2019-12-28 | End: 2019-12-30

## 2019-12-28 RX ORDER — WARFARIN SODIUM 2.5 MG/1
5 TABLET ORAL ONCE
Refills: 0 | Status: COMPLETED | OUTPATIENT
Start: 2019-12-28 | End: 2019-12-28

## 2019-12-28 RX ORDER — BENZOCAINE AND MENTHOL 5; 1 G/100ML; G/100ML
1 LIQUID ORAL
Refills: 0 | Status: DISCONTINUED | OUTPATIENT
Start: 2019-12-28 | End: 2020-01-02

## 2019-12-28 RX ORDER — FUROSEMIDE 40 MG
40 TABLET ORAL
Refills: 0 | Status: DISCONTINUED | OUTPATIENT
Start: 2019-12-28 | End: 2019-12-28

## 2019-12-28 RX ADMIN — Medication 1: at 09:16

## 2019-12-28 RX ADMIN — Medication 40 MILLIGRAM(S): at 18:24

## 2019-12-28 RX ADMIN — Medication 3 MILLILITER(S): at 05:39

## 2019-12-28 RX ADMIN — ALBUTEROL 2 PUFF(S): 90 AEROSOL, METERED ORAL at 21:48

## 2019-12-28 RX ADMIN — RANOLAZINE 500 MILLIGRAM(S): 500 TABLET, FILM COATED, EXTENDED RELEASE ORAL at 13:16

## 2019-12-28 RX ADMIN — Medication 2: at 18:25

## 2019-12-28 RX ADMIN — ALBUTEROL 2 PUFF(S): 90 AEROSOL, METERED ORAL at 06:11

## 2019-12-28 RX ADMIN — ISOSORBIDE MONONITRATE 30 MILLIGRAM(S): 60 TABLET, EXTENDED RELEASE ORAL at 13:16

## 2019-12-28 RX ADMIN — ALBUTEROL 2 PUFF(S): 90 AEROSOL, METERED ORAL at 18:25

## 2019-12-28 RX ADMIN — BENZOCAINE AND MENTHOL 1 LOZENGE: 5; 1 LIQUID ORAL at 21:50

## 2019-12-28 RX ADMIN — WARFARIN SODIUM 5 MILLIGRAM(S): 2.5 TABLET ORAL at 18:24

## 2019-12-28 RX ADMIN — ALBUTEROL 2 PUFF(S): 90 AEROSOL, METERED ORAL at 09:15

## 2019-12-28 RX ADMIN — Medication 81 MILLIGRAM(S): at 13:16

## 2019-12-28 RX ADMIN — Medication 40 MILLIGRAM(S): at 06:11

## 2019-12-28 RX ADMIN — HEPARIN SODIUM 1100 UNIT(S)/HR: 5000 INJECTION INTRAVENOUS; SUBCUTANEOUS at 09:19

## 2019-12-28 RX ADMIN — BENZOCAINE AND MENTHOL 1 LOZENGE: 5; 1 LIQUID ORAL at 06:11

## 2019-12-28 RX ADMIN — Medication 100 MILLIGRAM(S): at 21:49

## 2019-12-28 RX ADMIN — ATORVASTATIN CALCIUM 20 MILLIGRAM(S): 80 TABLET, FILM COATED ORAL at 21:49

## 2019-12-28 RX ADMIN — Medication 3: at 13:16

## 2019-12-28 RX ADMIN — Medication 25 MILLIGRAM(S): at 06:11

## 2019-12-28 RX ADMIN — PANTOPRAZOLE SODIUM 40 MILLIGRAM(S): 20 TABLET, DELAYED RELEASE ORAL at 06:12

## 2019-12-28 RX ADMIN — LOSARTAN POTASSIUM 100 MILLIGRAM(S): 100 TABLET, FILM COATED ORAL at 06:15

## 2019-12-28 NOTE — PROGRESS NOTE ADULT - PROBLEM SELECTOR PLAN 4
20-40 pack year smoking history 20 yrs ago, no previous PFT's  -c/w duoneb,  will add cepacol for sore throat   - OP F/U with Pulm  - currently wo wheezing

## 2019-12-28 NOTE — PROGRESS NOTE ADULT - PROBLEM SELECTOR PLAN 10
DVT pps: on full AC   Transitions of Care Status:  1.  Name of PCP: ?Dr. Gonzalez  2.  PCP Contacted on Admission: [ ] Y    [x] N    3.  PCP contacted at Discharge: [ ] Y    [ ] N    [ ] N/A  4.  Post-Discharge Appointment Date and Location:  5.  Summary of Handoff given to PCP:

## 2019-12-28 NOTE — PROGRESS NOTE ADULT - PROBLEM SELECTOR PLAN 1
- appears euvolemic this morning, bump in renal fx also indicative of adequate diuresis, change Lasix to po   - no cath or cardioversion  as with LV thrombus on TTE   - cw coumadin bridge, dose 5  mg tonight. considering LV thrombus, would need therapeutic INR prior to DC

## 2019-12-28 NOTE — PROGRESS NOTE ADULT - PROBLEM SELECTOR PLAN 3
Unclear if pt with h/o afib. Pt denies, EKG on admission with suspected Afib, CHADVASC score of 5  confirmed Left ventricular thrombus  - c/w Hep gtt to coumadin bridge  - monitor on tele

## 2019-12-28 NOTE — PROGRESS NOTE ADULT - PROBLEM SELECTOR PLAN 2
Atypical. Currently, chest pain free. EKG without any acute ischemic changes.  -Monitor on tele  - cards f/u

## 2019-12-29 LAB
ANION GAP SERPL CALC-SCNC: 14 MMO/L — SIGNIFICANT CHANGE UP (ref 7–14)
APTT BLD: 71.6 SEC — HIGH (ref 27.5–36.3)
BUN SERPL-MCNC: 37 MG/DL — HIGH (ref 7–23)
CALCIUM SERPL-MCNC: 9 MG/DL — SIGNIFICANT CHANGE UP (ref 8.4–10.5)
CHLORIDE SERPL-SCNC: 103 MMOL/L — SIGNIFICANT CHANGE UP (ref 98–107)
CO2 SERPL-SCNC: 24 MMOL/L — SIGNIFICANT CHANGE UP (ref 22–31)
CREAT SERPL-MCNC: 1.11 MG/DL — SIGNIFICANT CHANGE UP (ref 0.5–1.3)
GLUCOSE SERPL-MCNC: 193 MG/DL — HIGH (ref 70–99)
HCT VFR BLD CALC: 44.1 % — SIGNIFICANT CHANGE UP (ref 39–50)
HGB BLD-MCNC: 14.1 G/DL — SIGNIFICANT CHANGE UP (ref 13–17)
INR BLD: 1.25 — HIGH (ref 0.88–1.17)
MAGNESIUM SERPL-MCNC: 1.9 MG/DL — SIGNIFICANT CHANGE UP (ref 1.6–2.6)
MCHC RBC-ENTMCNC: 29.1 PG — SIGNIFICANT CHANGE UP (ref 27–34)
MCHC RBC-ENTMCNC: 32 % — SIGNIFICANT CHANGE UP (ref 32–36)
MCV RBC AUTO: 91.1 FL — SIGNIFICANT CHANGE UP (ref 80–100)
NRBC # FLD: 0 K/UL — SIGNIFICANT CHANGE UP (ref 0–0)
PHOSPHATE SERPL-MCNC: 3.1 MG/DL — SIGNIFICANT CHANGE UP (ref 2.5–4.5)
PLATELET # BLD AUTO: 197 K/UL — SIGNIFICANT CHANGE UP (ref 150–400)
PMV BLD: 11.3 FL — SIGNIFICANT CHANGE UP (ref 7–13)
POTASSIUM SERPL-MCNC: 3.7 MMOL/L — SIGNIFICANT CHANGE UP (ref 3.5–5.3)
POTASSIUM SERPL-SCNC: 3.7 MMOL/L — SIGNIFICANT CHANGE UP (ref 3.5–5.3)
PROTHROM AB SERPL-ACNC: 14.4 SEC — HIGH (ref 9.8–13.1)
RBC # BLD: 4.84 M/UL — SIGNIFICANT CHANGE UP (ref 4.2–5.8)
RBC # FLD: 13.3 % — SIGNIFICANT CHANGE UP (ref 10.3–14.5)
SODIUM SERPL-SCNC: 141 MMOL/L — SIGNIFICANT CHANGE UP (ref 135–145)
WBC # BLD: 8.99 K/UL — SIGNIFICANT CHANGE UP (ref 3.8–10.5)
WBC # FLD AUTO: 8.99 K/UL — SIGNIFICANT CHANGE UP (ref 3.8–10.5)

## 2019-12-29 PROCEDURE — 99233 SBSQ HOSP IP/OBS HIGH 50: CPT

## 2019-12-29 RX ORDER — WARFARIN SODIUM 2.5 MG/1
7.5 TABLET ORAL ONCE
Refills: 0 | Status: COMPLETED | OUTPATIENT
Start: 2019-12-29 | End: 2019-12-29

## 2019-12-29 RX ORDER — ACETAMINOPHEN 500 MG
650 TABLET ORAL ONCE
Refills: 0 | Status: COMPLETED | OUTPATIENT
Start: 2019-12-29 | End: 2019-12-29

## 2019-12-29 RX ADMIN — Medication 2: at 18:04

## 2019-12-29 RX ADMIN — BENZOCAINE AND MENTHOL 1 LOZENGE: 5; 1 LIQUID ORAL at 21:35

## 2019-12-29 RX ADMIN — Medication 40 MILLIGRAM(S): at 05:34

## 2019-12-29 RX ADMIN — Medication 40 MILLIGRAM(S): at 18:03

## 2019-12-29 RX ADMIN — RANOLAZINE 500 MILLIGRAM(S): 500 TABLET, FILM COATED, EXTENDED RELEASE ORAL at 13:16

## 2019-12-29 RX ADMIN — ISOSORBIDE MONONITRATE 30 MILLIGRAM(S): 60 TABLET, EXTENDED RELEASE ORAL at 13:15

## 2019-12-29 RX ADMIN — PANTOPRAZOLE SODIUM 40 MILLIGRAM(S): 20 TABLET, DELAYED RELEASE ORAL at 05:36

## 2019-12-29 RX ADMIN — Medication 81 MILLIGRAM(S): at 13:15

## 2019-12-29 RX ADMIN — ALBUTEROL 2 PUFF(S): 90 AEROSOL, METERED ORAL at 09:19

## 2019-12-29 RX ADMIN — LOSARTAN POTASSIUM 100 MILLIGRAM(S): 100 TABLET, FILM COATED ORAL at 05:34

## 2019-12-29 RX ADMIN — BENZOCAINE AND MENTHOL 1 LOZENGE: 5; 1 LIQUID ORAL at 05:35

## 2019-12-29 RX ADMIN — HEPARIN SODIUM 1100 UNIT(S)/HR: 5000 INJECTION INTRAVENOUS; SUBCUTANEOUS at 09:24

## 2019-12-29 RX ADMIN — ALBUTEROL 2 PUFF(S): 90 AEROSOL, METERED ORAL at 05:23

## 2019-12-29 RX ADMIN — Medication 650 MILLIGRAM(S): at 22:37

## 2019-12-29 RX ADMIN — Medication 650 MILLIGRAM(S): at 21:51

## 2019-12-29 RX ADMIN — Medication 2: at 13:15

## 2019-12-29 RX ADMIN — ATORVASTATIN CALCIUM 20 MILLIGRAM(S): 80 TABLET, FILM COATED ORAL at 21:31

## 2019-12-29 RX ADMIN — Medication 25 MILLIGRAM(S): at 05:33

## 2019-12-29 RX ADMIN — Medication 1: at 09:19

## 2019-12-29 RX ADMIN — ALBUTEROL 2 PUFF(S): 90 AEROSOL, METERED ORAL at 21:31

## 2019-12-29 RX ADMIN — WARFARIN SODIUM 7.5 MILLIGRAM(S): 2.5 TABLET ORAL at 18:04

## 2019-12-29 NOTE — PROGRESS NOTE ADULT - PROBLEM SELECTOR PLAN 1
- appears euvolemic this morning, bump in renal fx also indicative of adequate diuresis, change Lasix to po   - no cath or cardioversion  as with LV thrombus on TTE   - cw coumadin bridge. considering LV thrombus, would need therapeutic INR prior to DC

## 2019-12-30 LAB
ANION GAP SERPL CALC-SCNC: 12 MMO/L — SIGNIFICANT CHANGE UP (ref 7–14)
ANION GAP SERPL CALC-SCNC: 16 MMO/L — HIGH (ref 7–14)
APTT BLD: 82.1 SEC — HIGH (ref 27.5–36.3)
BUN SERPL-MCNC: 30 MG/DL — HIGH (ref 7–23)
BUN SERPL-MCNC: 33 MG/DL — HIGH (ref 7–23)
CALCIUM SERPL-MCNC: 8.9 MG/DL — SIGNIFICANT CHANGE UP (ref 8.4–10.5)
CALCIUM SERPL-MCNC: 9.1 MG/DL — SIGNIFICANT CHANGE UP (ref 8.4–10.5)
CHLORIDE SERPL-SCNC: 101 MMOL/L — SIGNIFICANT CHANGE UP (ref 98–107)
CHLORIDE SERPL-SCNC: 103 MMOL/L — SIGNIFICANT CHANGE UP (ref 98–107)
CO2 SERPL-SCNC: 22 MMOL/L — SIGNIFICANT CHANGE UP (ref 22–31)
CO2 SERPL-SCNC: 24 MMOL/L — SIGNIFICANT CHANGE UP (ref 22–31)
CREAT SERPL-MCNC: 1.05 MG/DL — SIGNIFICANT CHANGE UP (ref 0.5–1.3)
CREAT SERPL-MCNC: 1.13 MG/DL — SIGNIFICANT CHANGE UP (ref 0.5–1.3)
GLUCOSE BLDC GLUCOMTR-MCNC: 212 MG/DL — HIGH (ref 70–99)
GLUCOSE BLDC GLUCOMTR-MCNC: 243 MG/DL — HIGH (ref 70–99)
GLUCOSE BLDC GLUCOMTR-MCNC: 260 MG/DL — HIGH (ref 70–99)
GLUCOSE SERPL-MCNC: 268 MG/DL — HIGH (ref 70–99)
GLUCOSE SERPL-MCNC: 290 MG/DL — HIGH (ref 70–99)
HCT VFR BLD CALC: 45.5 % — SIGNIFICANT CHANGE UP (ref 39–50)
HGB BLD-MCNC: 14.4 G/DL — SIGNIFICANT CHANGE UP (ref 13–17)
INR BLD: 1.85 — HIGH (ref 0.88–1.17)
MAGNESIUM SERPL-MCNC: 2 MG/DL — SIGNIFICANT CHANGE UP (ref 1.6–2.6)
MAGNESIUM SERPL-MCNC: 2 MG/DL — SIGNIFICANT CHANGE UP (ref 1.6–2.6)
MCHC RBC-ENTMCNC: 29.1 PG — SIGNIFICANT CHANGE UP (ref 27–34)
MCHC RBC-ENTMCNC: 31.6 % — LOW (ref 32–36)
MCV RBC AUTO: 92.1 FL — SIGNIFICANT CHANGE UP (ref 80–100)
NRBC # FLD: 0 K/UL — SIGNIFICANT CHANGE UP (ref 0–0)
PHOSPHATE SERPL-MCNC: 2.6 MG/DL — SIGNIFICANT CHANGE UP (ref 2.5–4.5)
PHOSPHATE SERPL-MCNC: 2.9 MG/DL — SIGNIFICANT CHANGE UP (ref 2.5–4.5)
PLATELET # BLD AUTO: 194 K/UL — SIGNIFICANT CHANGE UP (ref 150–400)
PMV BLD: 11.4 FL — SIGNIFICANT CHANGE UP (ref 7–13)
POTASSIUM SERPL-MCNC: 3.9 MMOL/L — SIGNIFICANT CHANGE UP (ref 3.5–5.3)
POTASSIUM SERPL-MCNC: 4.1 MMOL/L — SIGNIFICANT CHANGE UP (ref 3.5–5.3)
POTASSIUM SERPL-SCNC: 3.9 MMOL/L — SIGNIFICANT CHANGE UP (ref 3.5–5.3)
POTASSIUM SERPL-SCNC: 4.1 MMOL/L — SIGNIFICANT CHANGE UP (ref 3.5–5.3)
PROTHROM AB SERPL-ACNC: 20.9 SEC — HIGH (ref 9.8–13.1)
RBC # BLD: 4.94 M/UL — SIGNIFICANT CHANGE UP (ref 4.2–5.8)
RBC # FLD: 13.3 % — SIGNIFICANT CHANGE UP (ref 10.3–14.5)
SODIUM SERPL-SCNC: 139 MMOL/L — SIGNIFICANT CHANGE UP (ref 135–145)
SODIUM SERPL-SCNC: 139 MMOL/L — SIGNIFICANT CHANGE UP (ref 135–145)
WBC # BLD: 8.31 K/UL — SIGNIFICANT CHANGE UP (ref 3.8–10.5)
WBC # FLD AUTO: 8.31 K/UL — SIGNIFICANT CHANGE UP (ref 3.8–10.5)

## 2019-12-30 PROCEDURE — 99233 SBSQ HOSP IP/OBS HIGH 50: CPT

## 2019-12-30 RX ORDER — ACETAMINOPHEN 500 MG
650 TABLET ORAL EVERY 6 HOURS
Refills: 0 | Status: DISCONTINUED | OUTPATIENT
Start: 2019-12-30 | End: 2020-01-02

## 2019-12-30 RX ORDER — FUROSEMIDE 40 MG
40 TABLET ORAL DAILY
Refills: 0 | Status: DISCONTINUED | OUTPATIENT
Start: 2019-12-30 | End: 2020-01-02

## 2019-12-30 RX ORDER — INSULIN LISPRO 100/ML
VIAL (ML) SUBCUTANEOUS AT BEDTIME
Refills: 0 | Status: DISCONTINUED | OUTPATIENT
Start: 2019-12-30 | End: 2020-01-02

## 2019-12-30 RX ORDER — ATORVASTATIN CALCIUM 80 MG/1
1 TABLET, FILM COATED ORAL
Qty: 0 | Refills: 0 | DISCHARGE

## 2019-12-30 RX ORDER — WARFARIN SODIUM 2.5 MG/1
5 TABLET ORAL ONCE
Refills: 0 | Status: COMPLETED | OUTPATIENT
Start: 2019-12-30 | End: 2019-12-30

## 2019-12-30 RX ORDER — METFORMIN HYDROCHLORIDE 850 MG/1
1 TABLET ORAL
Qty: 0 | Refills: 0 | DISCHARGE

## 2019-12-30 RX ORDER — INSULIN LISPRO 100/ML
VIAL (ML) SUBCUTANEOUS
Refills: 0 | Status: DISCONTINUED | OUTPATIENT
Start: 2019-12-30 | End: 2020-01-02

## 2019-12-30 RX ADMIN — Medication 40 MILLIGRAM(S): at 05:44

## 2019-12-30 RX ADMIN — Medication 81 MILLIGRAM(S): at 12:51

## 2019-12-30 RX ADMIN — WARFARIN SODIUM 5 MILLIGRAM(S): 2.5 TABLET ORAL at 18:39

## 2019-12-30 RX ADMIN — Medication 25 MILLIGRAM(S): at 05:44

## 2019-12-30 RX ADMIN — ALBUTEROL 2 PUFF(S): 90 AEROSOL, METERED ORAL at 21:39

## 2019-12-30 RX ADMIN — PANTOPRAZOLE SODIUM 40 MILLIGRAM(S): 20 TABLET, DELAYED RELEASE ORAL at 05:44

## 2019-12-30 RX ADMIN — Medication 2: at 08:51

## 2019-12-30 RX ADMIN — HEPARIN SODIUM 1100 UNIT(S)/HR: 5000 INJECTION INTRAVENOUS; SUBCUTANEOUS at 07:02

## 2019-12-30 RX ADMIN — ATORVASTATIN CALCIUM 20 MILLIGRAM(S): 80 TABLET, FILM COATED ORAL at 21:40

## 2019-12-30 RX ADMIN — Medication 2: at 21:50

## 2019-12-30 RX ADMIN — ALBUTEROL 2 PUFF(S): 90 AEROSOL, METERED ORAL at 05:45

## 2019-12-30 RX ADMIN — LOSARTAN POTASSIUM 100 MILLIGRAM(S): 100 TABLET, FILM COATED ORAL at 05:44

## 2019-12-30 RX ADMIN — RANOLAZINE 500 MILLIGRAM(S): 500 TABLET, FILM COATED, EXTENDED RELEASE ORAL at 12:51

## 2019-12-30 RX ADMIN — BENZOCAINE AND MENTHOL 1 LOZENGE: 5; 1 LIQUID ORAL at 05:45

## 2019-12-30 RX ADMIN — Medication 4: at 18:40

## 2019-12-30 RX ADMIN — ISOSORBIDE MONONITRATE 30 MILLIGRAM(S): 60 TABLET, EXTENDED RELEASE ORAL at 12:51

## 2019-12-30 RX ADMIN — Medication 650 MILLIGRAM(S): at 21:49

## 2019-12-30 RX ADMIN — BENZOCAINE AND MENTHOL 1 LOZENGE: 5; 1 LIQUID ORAL at 18:54

## 2019-12-30 RX ADMIN — Medication 650 MILLIGRAM(S): at 22:34

## 2019-12-30 NOTE — PROGRESS NOTE ADULT - ATTENDING COMMENTS
Spoke to pt with translation of Daughter Humaira about plan of care. They understood and agreed with the plan.

## 2019-12-30 NOTE — PROGRESS NOTE ADULT - PROBLEM SELECTOR PLAN 9
-Full ROM on exam with no S/S of infection. Likely osteoarthritis given obesity. Continue to monitor. Bilateral knee X-ray reviewed, WNL  #Rt shoulder pain:  XR of Rt shoulder, cervical spine, suspected cervical radiculopathy,   -Pain control and physical therapy  -Outpatient Orthopedic f/u

## 2019-12-30 NOTE — PROGRESS NOTE ADULT - PROBLEM SELECTOR PLAN 1
- appears euvolemic on Lasix  po   - no cath or cardioversion  as with LV thrombus on TTE   - cw heparin drip and coumadin bridge. considering LV thrombus, would need therapeutic INR prior to DC

## 2019-12-31 LAB
ANION GAP SERPL CALC-SCNC: 16 MMO/L — HIGH (ref 7–14)
APTT BLD: 85.7 SEC — HIGH (ref 27.5–36.3)
BUN SERPL-MCNC: 26 MG/DL — HIGH (ref 7–23)
CALCIUM SERPL-MCNC: 9 MG/DL — SIGNIFICANT CHANGE UP (ref 8.4–10.5)
CHLORIDE SERPL-SCNC: 103 MMOL/L — SIGNIFICANT CHANGE UP (ref 98–107)
CO2 SERPL-SCNC: 19 MMOL/L — LOW (ref 22–31)
CREAT SERPL-MCNC: 0.92 MG/DL — SIGNIFICANT CHANGE UP (ref 0.5–1.3)
GLUCOSE BLDC GLUCOMTR-MCNC: 149 MG/DL — HIGH (ref 70–99)
GLUCOSE BLDC GLUCOMTR-MCNC: 206 MG/DL — HIGH (ref 70–99)
GLUCOSE BLDC GLUCOMTR-MCNC: 266 MG/DL — HIGH (ref 70–99)
GLUCOSE BLDC GLUCOMTR-MCNC: 300 MG/DL — HIGH (ref 70–99)
GLUCOSE SERPL-MCNC: 202 MG/DL — HIGH (ref 70–99)
HCT VFR BLD CALC: 44.4 % — SIGNIFICANT CHANGE UP (ref 39–50)
HGB BLD-MCNC: 14.1 G/DL — SIGNIFICANT CHANGE UP (ref 13–17)
INR BLD: 2.71 — HIGH (ref 0.88–1.17)
MAGNESIUM SERPL-MCNC: 2 MG/DL — SIGNIFICANT CHANGE UP (ref 1.6–2.6)
MCHC RBC-ENTMCNC: 29 PG — SIGNIFICANT CHANGE UP (ref 27–34)
MCHC RBC-ENTMCNC: 31.8 % — LOW (ref 32–36)
MCV RBC AUTO: 91.4 FL — SIGNIFICANT CHANGE UP (ref 80–100)
NRBC # FLD: 0 K/UL — SIGNIFICANT CHANGE UP (ref 0–0)
PHOSPHATE SERPL-MCNC: 2.2 MG/DL — LOW (ref 2.5–4.5)
PLATELET # BLD AUTO: 179 K/UL — SIGNIFICANT CHANGE UP (ref 150–400)
PMV BLD: 11.5 FL — SIGNIFICANT CHANGE UP (ref 7–13)
POTASSIUM SERPL-MCNC: 4.5 MMOL/L — SIGNIFICANT CHANGE UP (ref 3.5–5.3)
POTASSIUM SERPL-SCNC: 4.5 MMOL/L — SIGNIFICANT CHANGE UP (ref 3.5–5.3)
PROTHROM AB SERPL-ACNC: 31.9 SEC — HIGH (ref 9.8–13.1)
RBC # BLD: 4.86 M/UL — SIGNIFICANT CHANGE UP (ref 4.2–5.8)
RBC # FLD: 13.2 % — SIGNIFICANT CHANGE UP (ref 10.3–14.5)
SODIUM SERPL-SCNC: 138 MMOL/L — SIGNIFICANT CHANGE UP (ref 135–145)
WBC # BLD: 7.03 K/UL — SIGNIFICANT CHANGE UP (ref 3.8–10.5)
WBC # FLD AUTO: 7.03 K/UL — SIGNIFICANT CHANGE UP (ref 3.8–10.5)

## 2019-12-31 PROCEDURE — 99222 1ST HOSP IP/OBS MODERATE 55: CPT

## 2019-12-31 PROCEDURE — 99233 SBSQ HOSP IP/OBS HIGH 50: CPT

## 2019-12-31 RX ORDER — WARFARIN SODIUM 2.5 MG/1
2.5 TABLET ORAL ONCE
Refills: 0 | Status: COMPLETED | OUTPATIENT
Start: 2019-12-31 | End: 2019-12-31

## 2019-12-31 RX ADMIN — WARFARIN SODIUM 2.5 MILLIGRAM(S): 2.5 TABLET ORAL at 18:20

## 2019-12-31 RX ADMIN — BENZOCAINE AND MENTHOL 1 LOZENGE: 5; 1 LIQUID ORAL at 05:50

## 2019-12-31 RX ADMIN — Medication 25 MILLIGRAM(S): at 05:50

## 2019-12-31 RX ADMIN — Medication 4: at 09:24

## 2019-12-31 RX ADMIN — ALBUTEROL 2 PUFF(S): 90 AEROSOL, METERED ORAL at 05:49

## 2019-12-31 RX ADMIN — ALBUTEROL 2 PUFF(S): 90 AEROSOL, METERED ORAL at 18:20

## 2019-12-31 RX ADMIN — ALBUTEROL 2 PUFF(S): 90 AEROSOL, METERED ORAL at 21:53

## 2019-12-31 RX ADMIN — PANTOPRAZOLE SODIUM 40 MILLIGRAM(S): 20 TABLET, DELAYED RELEASE ORAL at 05:50

## 2019-12-31 RX ADMIN — RANOLAZINE 500 MILLIGRAM(S): 500 TABLET, FILM COATED, EXTENDED RELEASE ORAL at 13:06

## 2019-12-31 RX ADMIN — Medication 40 MILLIGRAM(S): at 05:50

## 2019-12-31 RX ADMIN — LOSARTAN POTASSIUM 100 MILLIGRAM(S): 100 TABLET, FILM COATED ORAL at 05:50

## 2019-12-31 RX ADMIN — ALBUTEROL 2 PUFF(S): 90 AEROSOL, METERED ORAL at 13:06

## 2019-12-31 RX ADMIN — ISOSORBIDE MONONITRATE 30 MILLIGRAM(S): 60 TABLET, EXTENDED RELEASE ORAL at 13:06

## 2019-12-31 RX ADMIN — BENZOCAINE AND MENTHOL 1 LOZENGE: 5; 1 LIQUID ORAL at 21:53

## 2019-12-31 RX ADMIN — Medication 81 MILLIGRAM(S): at 13:05

## 2019-12-31 RX ADMIN — Medication 6: at 13:08

## 2019-12-31 RX ADMIN — Medication 2: at 21:53

## 2019-12-31 RX ADMIN — ATORVASTATIN CALCIUM 20 MILLIGRAM(S): 80 TABLET, FILM COATED ORAL at 21:53

## 2019-12-31 RX ADMIN — HEPARIN SODIUM 1100 UNIT(S)/HR: 5000 INJECTION INTRAVENOUS; SUBCUTANEOUS at 13:37

## 2019-12-31 NOTE — CONSULT NOTE ADULT - ASSESSMENT
63 yo M, an ex-smoker (20 pack year, quit 20 yrs ago) with a PMH of CAD (100% stenosis of pRCA), HFrEF (LVEF 35% on 2017 cath) on OMT, HLD, T2DM, HTN, mod-severe MR, and LV aneurysm and thrombus (2017) Warfarin dc'd several months for unclear reasons.  Presented to the ED on 12/24/2019 with a cc of progressive chest pain associated with BLACK, PND, pharyngitis, and dyspnea x 3 days.  Hospital course significant for newly diagnosed Afib rate controlled and ADHF, occasional PVCs and brief 3b NSVT; CHA2 DS2 VaSC, 5:    - IV hep bridge to coumadin per Cardiology  - continue GDMT and diuretics per Cardiology  - continue telemetry for now  - Keep K+ 4.0 - 4.5 & Mg 2.0 - 2.5  - further recommendations to follow  - d/w EP attending, Dr. Sánchez

## 2019-12-31 NOTE — PROGRESS NOTE ADULT - PROBLEM SELECTOR PLAN 9
-Full ROM on exam with no S/S of infection. Likely osteoarthritis given obesity. Continue to monitor. Bilateral knee X-ray reviewed, WNL  #Rt shoulder pain:  XR of Rt shoulder, cervical spine showed ? arthritis of right shoulder,   -Pain control and physical therapy  -Outpatient Orthopedic f/u

## 2019-12-31 NOTE — PROGRESS NOTE ADULT - PROBLEM SELECTOR PLAN 1
- appears euvolemic on Lasix  po   - no cath or cardioversion  as with LV thrombus on TTE   - cw heparin drip and coumadin bridge. considering LV thrombus, would need overlap therapeutic INR with Heparin drip for 1 day prior to DC

## 2019-12-31 NOTE — PROGRESS NOTE ADULT - ATTENDING COMMENTS
Spoke to Daughter Kennedi on the phone about f/u issues. She understood and agreed with the plan.  Possible d/c tomorrow if INR therapeutic.

## 2019-12-31 NOTE — CONSULT NOTE ADULT - SUBJECTIVE AND OBJECTIVE BOX
HPI/CC:  Asked to see this patient for ICD candidacy.  This is a 65 yo M, an ex-smoker (20 pack year, quit 20 yrs ago) with a PMH of CAD (100% stenosis of pRCA), HFrEF (LVEF 35% on 2017 cath) on OMT, HLD, T2DM, HTN, mod-severe MR, and LV aneurysm and thrombus (2017) Warfarin dc'd several months for unclear reasons.  Presented to the ED on 2019 with a cc of progressive chest pain associated with BLACK, PND, pharyngitis, and dyspnea x 3 days.  Hospital course significant for newly diagnosed Afib rate controlled and ADHF. Telemetry demonstrates Afib, V rates in the 70-80s, 3beats of NSVT on 2019.    PAST MEDICAL & SURGICAL HISTORY:  Heart problem  Hyperlipidemia  Hypertension  No significant past surgical history  	  FAMILY HISTORY:  No pertinent family history in first degree relatives    DRUG ALLERGIES:  No Known Allergies    MEDICATIONS:  aspirin enteric coated 81 milliGRAM(s) Oral daily  furosemide    Tablet 40 milliGRAM(s) Oral daily  heparin  Infusion.  Unit(s)/Hr IV Continuous <Continuous>  isosorbide   mononitrate ER Tablet (IMDUR) 30 milliGRAM(s) Oral daily  losartan 100 milliGRAM(s) Oral daily  metoprolol succinate ER 25 milliGRAM(s) Oral daily  ranolazine 500 milliGRAM(s) Oral daily    SOCIAL HISTORY:    Denies illicit drug use or tobacco use  Denies alcohol 	    REVIEW OF SYSTEMS:  CONSTITUTIONAL: No fever, weight loss, or fatigue  EYES: No eye pain, visual disturbances, or discharge  ENMT:  No difficulty hearing, tinnitus, vertigo; No sinus pain, +throat pain  NECK: No pain or stiffness  RESPIRATORY: No cough, wheezing, chills or hemoptysis; +shortness of Breath, +PND  CARDIOVASCULAR: No chest pain, palpitations, passing out, dizziness, or leg swelling  GASTROINTESTINAL: No abdominal or epigastric pain. No nausea, vomiting, or hematemesis; No diarrhea or constipation. No melena or hematochezia.  GENITOURINARY: No dysuria, frequency, hematuria, or incontinence  NEUROLOGICAL: No headaches, memory loss, loss of strength, numbness, or tremors  SKIN: No itching, burning, rashes, or lesions   LYMPH Nodes: No enlarged glands  ENDOCRINE: No heat or cold intolerance; No hair loss  MUSCULOSKELETAL: No joint pain or swelling; No muscle, back, or extremity pain  PSYCHIATRIC: No depression, anxiety, mood swings, or difficulty sleeping  HEME/LYMPH: No easy bruising, or bleeding gums  ALLERGY AND IMMUNOLOGIC: No hives or eczema	  All others negative	    PHYSICAL EXAM:  T(C): 37 (19 @ 17:46), Max: 37 (19 @ 17:46)  HR: 61 (19 @ 17:46) (61 - 76)  BP: 117/67 (19 @ 17:46) (117/67 - 144/65)  RR: 16 (19 @ 17:46) (16 - 18)  SpO2: 98% (19 @ 17:46) (98% - 100%)  Wt(kg): --  I&O's Summary    30 Dec 2019 07:01  -  31 Dec 2019 07:00  --------------------------------------------------------  IN: 420 mL / OUT: 1600 mL / NET: -1180 mL    Appearance: Normal	  HEENT:   Normal oral mucosa, PERRL, EOMI	  Lymphatic: No lymphadenopathy  Cardiovascular: Normal S1 S2, No JVD, No murmurs, No edema  Chest: clear  Respiratory: Mild exp wheezing, otherwise lungs clear to auscultation	  Psychiatry: A & O x 3, Mood & affect appropriate  Gastrointestinal:  Soft, Non-tender, + BS	; obese  Skin: No rashes, No ecchymoses, No cyanosis	  Neurologic: Non-focal  Extremities: Normal range of motion, No clubbing, cyanosis or edema  Vascular: Peripheral pulses palpable 2+ bilaterally    DIAGNOSTICS:  TELEMETRY: as above  12 Lead ECG:   < from: 12 Lead ECG (19 @ 18:57) >  Ventricular Rate 82 BPM  Atrial Rate 267 BPM  QRS Duration 106 ms  Q-T Interval 388 ms  QTC Calculation(Bezet) 453 ms  R Axis 69 degrees  T Axis -5 degrees  Diagnosis Line Atrial fibrillation with premature ventricular or aberrantly conducted complexes  Inferior infarct , age undetermined  Abnormal ECG  < end of copied text >    CATH:   < from: Cardiac Cath Lab - Adult (17 @ 14:04) >  [facility]  [address] [address] [place], [place] [address] [phone]  Cath Lab Report -- Comprehensive Report  Patient: DAVIDE AVENDAÑO  Study date: 2017  Account number: [id number]  MR number: [id number]  : 1955  Gender: Male  Race: O  Case Physician(s):  Car Mckinnon M.D.  Fellow:  Cedrick Garcia M.D.  Referring Physician:  Teto Gonzalez M.D.  INDICATIONS: Unstable angina - CCS3.  HISTORY: Calcified LV aneurysm on CT (10.0 x 7.7 cm). History of 1 myocardial  infarction(s). The patient has a history of coronary artery disease. The  patient has hypertension, oral hypoglycemic-treated diabetes, and  medication-treated dyslipidemia.    < end of copied text >  < from: Cardiac Cath Lab - Adult (17 @ 14:04) >  VENTRICLES: Global left ventricular function was moderately depressed. EF  estimated was 35 %. There was a large aneurysm involving the posterobasal wall  with evidence of calcification and likely thrombus.  VALVES: MITRAL VALVE: The mitral valve was evaluated by left ventriculography.  The mitral valve exhibited moderate to severe regurgitation.  CORONARY VESSELS: The coronary circulation is right dominant.  LM:   --  LM: Angiography showed minor luminal irregularities with no flow  limiting lesions.  --  Distal left main: There was a discrete 20 % stenosis.  LAD:   --  Proximal LAD: There was a discrete 40 % stenosis.  --  Mid LAD: There was a tubular 70 % stenosis. There was ZAYRA grade 3 flow  through the vessel (brisk flow). --  D1: Angiography showed minor luminal  irregularities with no flow limiting lesions.  CX:   --  Circumflex: Angiography showed minor luminal irregularities with no  flow limiting lesions.  RCA:   --  Proximal RCA: There was a 100 % stenosis. There was ZAYRA grade 0  flow through the vessel (no flow).  COMPLICATIONS: There were no complications.  DIAGNOSTIC RECOMMENDATIONS: Medical management is recommended. Echocardiogram  to assess mitral regurgitation severity.  Prepared and signed by  Car Mckinnon M.D.  Signed 2017 14:07:48    < end of copied text >    2D TTE:   < from: TTE with Doppler (w/Cont) (19 @ 15:22) >  Patient name: DAVIDE AVENDAÑO  YOB: 1955   Age: 64 (M)   MR#: [id number]  Study Date: 2019  Location: [place] [address] W/DefinitSonographer: D[facility]  Study quality: Technically Fair  Referring Physician: Yadira Dinh MD    < end of copied text >  < from: TTE with Doppler (w/Cont) (19 @ 15:22) >  DIMENSIONS:  Dimensions:     Normal Values:  LA:     5.4 cm    2.0 - 4.0 cm  Ao:     2.9 cm    2.0 - 3.8 cm  SEPTUM: 1.3 cm    0.6 - 1.2 cm  PWT:    1.3 cm    0.6 - 1.1 cm  LVIDd:  6.5 cm    3.0 - 5.6 cm  LVIDs:  5.0 cm    1.8 - 4.0 cm  Derived Variables:  LVMI: 192 g/m2  RWT: 0.40  Fractional short: 23 %  Ejection Fraction (Visual Estimate): 35-40 %  Peak Velocity (m/sec): AoV=2.7  ------------------------------------------------------------------------  < end of copied text >  < from: TTE with Doppler (w/Cont) (19 @ 15:22) >  CONCLUSIONS:  1. Mitral annular calcification and calcified mitral  leaflets with normal diastolic opening. Moderate mitral  regurgitation.  2. Calcified trileaflet aortic valve with decreased  opening. Peak transaortic valve gradient equals 29 mm Hg,  mean transaortic valve gradient equals 18 mm Hg, consistent  with mild aortic stenosis. Minimal aortic regurgitation.  3. Mildly dilated left atrium.  LA volume index = 40 cc/m2.  4. Severe segmental left ventricular systolic dysfunction.  Large aneurysm of the basal to mid inferior wall, and basal  to mid inferolateral wall is noted. Large thrombus  measuring about 3.1 x 2.4 cm is noted with the aneurysm  cavity.  5. The right ventricle is not well visualized; grossly  normal right ventricular systolic function.  6. Normal tricuspid valve. Mild tricuspid regurgitation.  *** Compared with echocardiogram of 2017, no  significant interval changes noted. The thrombus appears  slightly smaller in size on today's study.  ------------------------------------------------------------------------  Confirmed on  2019 - 16:53:37 by Olivier Benitez MD,  [facility], [facility], RPVI    < end of copied text >    RAJAN: N/A  	  RADIOLOGY:   < from: Xray Chest 2 Views PA/Lat (19 @ 20:48) >  EXAM:  XR CHEST PA LAT 2V    PROCEDURE DATE:  Dec 24 2019   INTERPRETATION:  CLINICAL INFORMATION: Chest pain.  TECHNIQUE: Frontal and lateral radiographs of the chest dated 2019 8:48 PM.  COMPARISON: CT chest 2017.  FINDINGS:  No focal consolidation.  No pleural effusions. No pneumothorax.  Cardiac size is enlarged. Calcified left ventricular aneurysm, better evaluated on prior CT 2017.  No acute osseous abnormality.     IMPRESSION: No focal consolidations    PASHA HACKETT M.D., RADIOLOGY RESIDENT  This document has been electronically signed.  JONAH KRAUS M.D., ATTENDING RADIOLOGIST  This document has been electronically signed. Dec 25 2019  8:40AM  < end of copied text >    < from: CT Chest No Cont (17 @ 10:52) >  EXAM:  CT CHEST    PROCEDURE DATE:  Sep 25 2017     < end of copied text >  < from: CT Chest No Cont (17 @ 10:52) >  HEART: Cardiomegaly. There is a calcified leftventricular aneurysm   measuring 10.0 x 7.7 cm. No pericardial effusion.    < end of copied text >  < from: CT Chest No Cont (17 @ 10:52) >  IMPRESSION:   1.  Calcified left ventricular aneurysm measuring 10.0 x 7.7 cm. This   corresponds to finding noted on chest radiograph dated 2017.  2.  7 mm right upper lobe nodule. Follow-up CT chest in 6 months is   recommended.    FROYLAN ZENG M.D., RADIOLOGY RESIDENT  This document has been electronically signed.  ADI MCKNINON M.D., ATTENDING RADIOLOGIST  This document has been electronically signed. Sep 25 2017 11:55AM    < end of copied text >    OTHER: N/A    LABS:	                      14.1   7.03  )-----------( 179      ( 31 Dec 2019 06:30 )             44.4     12-31    138  |  103  |  26<H>  ----------------------------<  202<H>  4.5   |  19<L>  |  0.92    Ca    9.0      31 Dec 2019 07:30  Phos  2.2     12-31  Mg     2.0     12-31    proBNP: N/A

## 2020-01-01 LAB
ANION GAP SERPL CALC-SCNC: 12 MMO/L — SIGNIFICANT CHANGE UP (ref 7–14)
APTT BLD: 96.3 SEC — HIGH (ref 27.5–36.3)
BASOPHILS # BLD AUTO: 0.06 K/UL — SIGNIFICANT CHANGE UP (ref 0–0.2)
BASOPHILS NFR BLD AUTO: 0.9 % — SIGNIFICANT CHANGE UP (ref 0–2)
BUN SERPL-MCNC: 28 MG/DL — HIGH (ref 7–23)
CALCIUM SERPL-MCNC: 9.1 MG/DL — SIGNIFICANT CHANGE UP (ref 8.4–10.5)
CHLORIDE SERPL-SCNC: 103 MMOL/L — SIGNIFICANT CHANGE UP (ref 98–107)
CO2 SERPL-SCNC: 22 MMOL/L — SIGNIFICANT CHANGE UP (ref 22–31)
CREAT SERPL-MCNC: 1.15 MG/DL — SIGNIFICANT CHANGE UP (ref 0.5–1.3)
EOSINOPHIL # BLD AUTO: 0.18 K/UL — SIGNIFICANT CHANGE UP (ref 0–0.5)
EOSINOPHIL NFR BLD AUTO: 2.6 % — SIGNIFICANT CHANGE UP (ref 0–6)
GLUCOSE BLDC GLUCOMTR-MCNC: 163 MG/DL — HIGH (ref 70–99)
GLUCOSE BLDC GLUCOMTR-MCNC: 195 MG/DL — HIGH (ref 70–99)
GLUCOSE BLDC GLUCOMTR-MCNC: 273 MG/DL — HIGH (ref 70–99)
GLUCOSE BLDC GLUCOMTR-MCNC: 296 MG/DL — HIGH (ref 70–99)
GLUCOSE SERPL-MCNC: 237 MG/DL — HIGH (ref 70–99)
HCT VFR BLD CALC: 43 % — SIGNIFICANT CHANGE UP (ref 39–50)
HCT VFR BLD CALC: 43 % — SIGNIFICANT CHANGE UP (ref 39–50)
HGB BLD-MCNC: 13.9 G/DL — SIGNIFICANT CHANGE UP (ref 13–17)
HGB BLD-MCNC: 13.9 G/DL — SIGNIFICANT CHANGE UP (ref 13–17)
IMM GRANULOCYTES NFR BLD AUTO: 0.4 % — SIGNIFICANT CHANGE UP (ref 0–1.5)
INR BLD: 3.27 — HIGH (ref 0.88–1.17)
LYMPHOCYTES # BLD AUTO: 1.09 K/UL — SIGNIFICANT CHANGE UP (ref 1–3.3)
LYMPHOCYTES # BLD AUTO: 15.6 % — SIGNIFICANT CHANGE UP (ref 13–44)
MAGNESIUM SERPL-MCNC: 2 MG/DL — SIGNIFICANT CHANGE UP (ref 1.6–2.6)
MCHC RBC-ENTMCNC: 29.8 PG — SIGNIFICANT CHANGE UP (ref 27–34)
MCHC RBC-ENTMCNC: 29.8 PG — SIGNIFICANT CHANGE UP (ref 27–34)
MCHC RBC-ENTMCNC: 32.3 % — SIGNIFICANT CHANGE UP (ref 32–36)
MCHC RBC-ENTMCNC: 32.3 % — SIGNIFICANT CHANGE UP (ref 32–36)
MCV RBC AUTO: 92.3 FL — SIGNIFICANT CHANGE UP (ref 80–100)
MCV RBC AUTO: 92.3 FL — SIGNIFICANT CHANGE UP (ref 80–100)
MONOCYTES # BLD AUTO: 0.65 K/UL — SIGNIFICANT CHANGE UP (ref 0–0.9)
MONOCYTES NFR BLD AUTO: 9.3 % — SIGNIFICANT CHANGE UP (ref 2–14)
NEUTROPHILS # BLD AUTO: 4.98 K/UL — SIGNIFICANT CHANGE UP (ref 1.8–7.4)
NEUTROPHILS NFR BLD AUTO: 71.2 % — SIGNIFICANT CHANGE UP (ref 43–77)
NRBC # FLD: 0 K/UL — SIGNIFICANT CHANGE UP (ref 0–0)
NRBC # FLD: 0 K/UL — SIGNIFICANT CHANGE UP (ref 0–0)
PHOSPHATE SERPL-MCNC: 2.4 MG/DL — LOW (ref 2.5–4.5)
PLATELET # BLD AUTO: 191 K/UL — SIGNIFICANT CHANGE UP (ref 150–400)
PLATELET # BLD AUTO: 191 K/UL — SIGNIFICANT CHANGE UP (ref 150–400)
PMV BLD: 11.6 FL — SIGNIFICANT CHANGE UP (ref 7–13)
PMV BLD: 11.6 FL — SIGNIFICANT CHANGE UP (ref 7–13)
POTASSIUM SERPL-MCNC: 4.3 MMOL/L — SIGNIFICANT CHANGE UP (ref 3.5–5.3)
POTASSIUM SERPL-SCNC: 4.3 MMOL/L — SIGNIFICANT CHANGE UP (ref 3.5–5.3)
PROTHROM AB SERPL-ACNC: 37.7 SEC — HIGH (ref 9.8–13.1)
RBC # BLD: 4.66 M/UL — SIGNIFICANT CHANGE UP (ref 4.2–5.8)
RBC # BLD: 4.66 M/UL — SIGNIFICANT CHANGE UP (ref 4.2–5.8)
RBC # FLD: 13.2 % — SIGNIFICANT CHANGE UP (ref 10.3–14.5)
RBC # FLD: 13.2 % — SIGNIFICANT CHANGE UP (ref 10.3–14.5)
SODIUM SERPL-SCNC: 137 MMOL/L — SIGNIFICANT CHANGE UP (ref 135–145)
WBC # BLD: 6.99 K/UL — SIGNIFICANT CHANGE UP (ref 3.8–10.5)
WBC # BLD: 6.99 K/UL — SIGNIFICANT CHANGE UP (ref 3.8–10.5)
WBC # FLD AUTO: 6.99 K/UL — SIGNIFICANT CHANGE UP (ref 3.8–10.5)
WBC # FLD AUTO: 6.99 K/UL — SIGNIFICANT CHANGE UP (ref 3.8–10.5)

## 2020-01-01 PROCEDURE — 99233 SBSQ HOSP IP/OBS HIGH 50: CPT

## 2020-01-01 RX ORDER — INSULIN GLARGINE 100 [IU]/ML
8 INJECTION, SOLUTION SUBCUTANEOUS AT BEDTIME
Refills: 0 | Status: DISCONTINUED | OUTPATIENT
Start: 2020-01-01 | End: 2020-01-02

## 2020-01-01 RX ADMIN — Medication 2: at 12:57

## 2020-01-01 RX ADMIN — INSULIN GLARGINE 8 UNIT(S): 100 INJECTION, SOLUTION SUBCUTANEOUS at 21:51

## 2020-01-01 RX ADMIN — Medication 81 MILLIGRAM(S): at 12:57

## 2020-01-01 RX ADMIN — ISOSORBIDE MONONITRATE 30 MILLIGRAM(S): 60 TABLET, EXTENDED RELEASE ORAL at 12:57

## 2020-01-01 RX ADMIN — Medication 25 MILLIGRAM(S): at 06:18

## 2020-01-01 RX ADMIN — LOSARTAN POTASSIUM 100 MILLIGRAM(S): 100 TABLET, FILM COATED ORAL at 06:18

## 2020-01-01 RX ADMIN — Medication 6: at 18:22

## 2020-01-01 RX ADMIN — Medication 40 MILLIGRAM(S): at 06:18

## 2020-01-01 RX ADMIN — BENZOCAINE AND MENTHOL 1 LOZENGE: 5; 1 LIQUID ORAL at 20:05

## 2020-01-01 RX ADMIN — Medication 650 MILLIGRAM(S): at 20:05

## 2020-01-01 RX ADMIN — ATORVASTATIN CALCIUM 20 MILLIGRAM(S): 80 TABLET, FILM COATED ORAL at 21:51

## 2020-01-01 RX ADMIN — Medication 650 MILLIGRAM(S): at 21:05

## 2020-01-01 RX ADMIN — ALBUTEROL 2 PUFF(S): 90 AEROSOL, METERED ORAL at 09:21

## 2020-01-01 RX ADMIN — PANTOPRAZOLE SODIUM 40 MILLIGRAM(S): 20 TABLET, DELAYED RELEASE ORAL at 06:18

## 2020-01-01 RX ADMIN — ALBUTEROL 2 PUFF(S): 90 AEROSOL, METERED ORAL at 21:51

## 2020-01-01 RX ADMIN — RANOLAZINE 500 MILLIGRAM(S): 500 TABLET, FILM COATED, EXTENDED RELEASE ORAL at 12:58

## 2020-01-01 RX ADMIN — Medication 6: at 09:21

## 2020-01-01 RX ADMIN — BENZOCAINE AND MENTHOL 1 LOZENGE: 5; 1 LIQUID ORAL at 06:18

## 2020-01-01 NOTE — PROGRESS NOTE ADULT - PROBLEM SELECTOR PLAN 1
- c/w Lasix PO   - no cath or cardioversion as with LV thrombus on TTE   - dc heparin as INR >3, hold coumadin today  - needs OP EP f/u

## 2020-01-01 NOTE — PROGRESS NOTE ADULT - PROBLEM SELECTOR PLAN 4
20-40 pack year smoking history 20 yrs ago, no previous PFT's  - c/w duoneb,  will add cepacol for sore throat   - OP F/U with Pulm

## 2020-01-01 NOTE — PROGRESS NOTE ADULT - PROBLEM SELECTOR PLAN 5
A1c 8.6, On Metformin at home.  - poorly controlled here, BG>600  - start lantus 8 units qhs while IP  - needs close OP endo or PCP f/u

## 2020-01-01 NOTE — PROGRESS NOTE ADULT - PROBLEM SELECTOR PLAN 9
- knee X-ray reviewed, WNL    #Rt shoulder pain:  XR of Rt shoulder, cervical spine showed ? arthritis of right shoulder,   - Pain control and physical therapy  - Outpatient Orthopedic f/u

## 2020-01-01 NOTE — PROGRESS NOTE ADULT - PROBLEM SELECTOR PLAN 2
Atypical. Currently, chest pain free. EKG without any acute ischemic changes.  - Monitor on tele  - [employer] cards c/s

## 2020-01-02 ENCOUNTER — TRANSCRIPTION ENCOUNTER (OUTPATIENT)
Age: 65
End: 2020-01-02

## 2020-01-02 VITALS
SYSTOLIC BLOOD PRESSURE: 113 MMHG | OXYGEN SATURATION: 100 % | DIASTOLIC BLOOD PRESSURE: 63 MMHG | WEIGHT: 211.86 LBS | HEART RATE: 69 BPM | TEMPERATURE: 98 F | RESPIRATION RATE: 18 BRPM

## 2020-01-02 LAB
GLUCOSE BLDC GLUCOMTR-MCNC: 208 MG/DL — HIGH (ref 70–99)
GLUCOSE BLDC GLUCOMTR-MCNC: 226 MG/DL — HIGH (ref 70–99)
GLUCOSE BLDC GLUCOMTR-MCNC: 261 MG/DL — HIGH (ref 70–99)
INR BLD: 2.29 — HIGH (ref 0.88–1.17)
PROTHROM AB SERPL-ACNC: 26.8 SEC — HIGH (ref 9.8–13.1)

## 2020-01-02 PROCEDURE — 99239 HOSP IP/OBS DSCHRG MGMT >30: CPT

## 2020-01-02 PROCEDURE — 99232 SBSQ HOSP IP/OBS MODERATE 35: CPT

## 2020-01-02 RX ORDER — PANTOPRAZOLE SODIUM 20 MG/1
1 TABLET, DELAYED RELEASE ORAL
Qty: 30 | Refills: 0
Start: 2020-01-02 | End: 2020-01-31

## 2020-01-02 RX ORDER — ISOSORBIDE DINITRATE 5 MG/1
1 TABLET ORAL
Qty: 0 | Refills: 0 | DISCHARGE

## 2020-01-02 RX ORDER — WARFARIN SODIUM 2.5 MG/1
1 TABLET ORAL
Qty: 30 | Refills: 0
Start: 2020-01-02 | End: 2020-01-31

## 2020-01-02 RX ORDER — WARFARIN SODIUM 2.5 MG/1
5 TABLET ORAL ONCE
Refills: 0 | Status: COMPLETED | OUTPATIENT
Start: 2020-01-02 | End: 2020-01-02

## 2020-01-02 RX ORDER — ACETAMINOPHEN 500 MG
2 TABLET ORAL
Qty: 0 | Refills: 0 | DISCHARGE
Start: 2020-01-02

## 2020-01-02 RX ORDER — RANOLAZINE 500 MG/1
1 TABLET, FILM COATED, EXTENDED RELEASE ORAL
Qty: 0 | Refills: 0 | DISCHARGE

## 2020-01-02 RX ADMIN — WARFARIN SODIUM 5 MILLIGRAM(S): 2.5 TABLET ORAL at 18:49

## 2020-01-02 RX ADMIN — ALBUTEROL 2 PUFF(S): 90 AEROSOL, METERED ORAL at 09:08

## 2020-01-02 RX ADMIN — Medication 4: at 09:07

## 2020-01-02 RX ADMIN — RANOLAZINE 500 MILLIGRAM(S): 500 TABLET, FILM COATED, EXTENDED RELEASE ORAL at 12:46

## 2020-01-02 RX ADMIN — Medication 25 MILLIGRAM(S): at 06:29

## 2020-01-02 RX ADMIN — ALBUTEROL 2 PUFF(S): 90 AEROSOL, METERED ORAL at 06:28

## 2020-01-02 RX ADMIN — ISOSORBIDE MONONITRATE 30 MILLIGRAM(S): 60 TABLET, EXTENDED RELEASE ORAL at 12:46

## 2020-01-02 RX ADMIN — Medication 81 MILLIGRAM(S): at 12:46

## 2020-01-02 RX ADMIN — Medication 4: at 18:20

## 2020-01-02 RX ADMIN — Medication 6: at 12:46

## 2020-01-02 RX ADMIN — LOSARTAN POTASSIUM 100 MILLIGRAM(S): 100 TABLET, FILM COATED ORAL at 06:29

## 2020-01-02 RX ADMIN — Medication 40 MILLIGRAM(S): at 06:28

## 2020-01-02 RX ADMIN — PANTOPRAZOLE SODIUM 40 MILLIGRAM(S): 20 TABLET, DELAYED RELEASE ORAL at 06:29

## 2020-01-02 RX ADMIN — POLYETHYLENE GLYCOL 3350 17 GRAM(S): 17 POWDER, FOR SOLUTION ORAL at 12:47

## 2020-01-02 NOTE — PROGRESS NOTE ADULT - PROVIDER SPECIALTY LIST ADULT
Cardiology
Electrophysiology
Hospitalist
Cardiology
Cardiology
Hospitalist

## 2020-01-02 NOTE — DISCHARGE NOTE NURSING/CASE MANAGEMENT/SOCIAL WORK - NSDCPEEMAIL_GEN_ALL_CORE
[facility] for Tobacco Control email [email or web address]

## 2020-01-02 NOTE — PROGRESS NOTE ADULT - PROBLEM SELECTOR PROBLEM 1
Acute on chronic systolic (congestive) heart failure
CHF exacerbation

## 2020-01-02 NOTE — PROGRESS NOTE ADULT - PROBLEM SELECTOR PLAN 5
A1c 8.6, On Metformin at home.  - poorly controlled here, BG>600  - start lantus 8 units qhs while IP  -Resume metformin at discharge  - needs close OP endo or PCP f/u

## 2020-01-02 NOTE — PROGRESS NOTE ADULT - PROBLEM SELECTOR PLAN 1
- c/w Lasix PO   - no cath or cardioversion as with LV thrombus on TTE   - INR therapeutic today  -D/c home today  -F/U with Dr Gonzalez tomorrow for PT/INR check (appointment made)  - needs OP EP f/u

## 2020-01-02 NOTE — PROGRESS NOTE ADULT - PROBLEM SELECTOR PLAN 2
Atypical. Currently, chest pain free. EKG without any acute ischemic changes.  - Monitor on tele  - [employer] cards c/s  medically stable for discharge

## 2020-01-02 NOTE — DISCHARGE NOTE PROVIDER - NSDCFUADDAPPT_GEN_ALL_CORE_FT
PLEASE FOLLOW UP WITH DR ESTRADA (CARDIOLOGIST) FOR INR CHECK ON FRIDAY 1/3/20 AT 3 PM AND FOLLOW UP APPOINTMENT ON MONDAY 1/5/20  PLEASE FOLLOW UP WITH DR REARDON (ELECTROPHYSIOLOGY) ON 4/7/20 AT 10:30 AM PLEASE FOLLOW UP WITH DR ESTRADA (CARDIOLOGIST) FOR INR CHECK ON FRIDAY 1/3/20 AT 3 PM AND FOLLOW UP APPOINTMENT ON MONDAY 1/5/20  PLEASE FOLLOW UP WITH DR REARDON (ELECTROPHYSIOLOGY) ON 4/7/20 AT 10:30 AM    Please follow up with your primary care doctor for further management of your diabetes. Please call your doctor to make an appointment.

## 2020-01-02 NOTE — DISCHARGE NOTE PROVIDER - CARE PROVIDERS DIRECT ADDRESSES
,DirectAddress_Unknown,[email or web address]

## 2020-01-02 NOTE — DISCHARGE NOTE PROVIDER - HOSPITAL COURSE
65 y/o M with PMH of CHF, CAD, Type 2 Diabetes, HTN, denies hx of Afib CHADVASC score of 5 presents to the ED with 3 days of worsening chest pain and also with Dyspnea of exertion and shortness of breath. Patient describes pain as intermittent and states that yesterday it has been lasting longer. Patient describes pain as being punched in chest and states that pain started on the left sided chest and radiated to right side of chest. Patient states that yesterday pain was 8/10 but now pain is very mild. Patient states that shortness of breath has been becoming worse. Patient admits to feeling short of breath when sitting and walking and states that he is unable to lay flat and has to sleep with several pillows. Patient also admits to having swollen abdomen and states that he has feeling bloated lately. Patient admits to having constipation, knee pain and occasional right arm numbness which occurs when lifting arm and endorses epigastric pain. Patient denies fever, chills.         On admission, patient was admitted to tele and was given lasix 40 mg IV BID.                 CHF exacerbation.      Admit to tele, continue to monitor, Echo ordered, Lasix 40 mg IVP BID, Strict I&Os. Cardiology consult in AM.         Chest pain.       Continue to monitor. Troponin 26-->29. Trend Cardiac enzymes. Echo ordered.         Atrial fibrillation.      Continue to monitor, not on anticoagulation States Dr. Gonzalez took him off AC 6 months ago. CHADVASC score of 5. Continue Metoprolol succinate 25 mg. Currently rate controlled.    Echo -         Constipation.      Continue to monitor. Started on Miralax.        B/l knee pain and shoulder pain    - X ray of right shoulder and b/l knees unremarkable 63 y/o M with PMH of CHF, CAD, Type 2 Diabetes, HTN, denies hx of Afib CHADVASC score of 5 presents to the ED with 3 days of worsening chest pain and also with Dyspnea of exertion and shortness of breath. Patient describes pain as intermittent and states that yesterday it has been lasting longer. Patient describes pain as being punched in chest and states that pain started on the left sided chest and radiated to right side of chest. Patient states that yesterday pain was 8/10 but now pain is very mild. Patient states that shortness of breath has been becoming worse. Patient admits to feeling short of breath when sitting and walking and states that he is unable to lay flat and has to sleep with several pillows. Patient also admits to having swollen abdomen and states that he has feeling bloated lately. Patient admits to having constipation, knee pain and occasional right arm numbness which occurs when lifting arm and endorses epigastric pain. Patient denies fever, chills.         On admission, patient was admitted to [facility] and was given lasix 40 mg IV BID for acute on chronic heart failure.  EKG on admission showed new onset Afib. Cardiac enzymes were unremarkable. Cardiology was consulted. Patient was placed on heparin gtt bridge to coumadin. Echocardiogram was ordered that showed. Severe segmental left ventricular systolic dysfunction. Large aneurysm of the basal to mid inferior wall, and basal to mid inferolateral wall is noted. Large thrombus measuring about 3.1 x 2.4 cm is noted with the aneurysm cavity. Patient was rate controlled on Metoprolol succinate 25 mg. Cath and cardioversion not done due to LV thrombus. EP was consulted for severe LV dysfunction. EP recommended to reassess candidacy for ICD and reevaluate Afib burden and get a repeat TTE outpatient for further evaluation. Patient was bridged to coumadin and will continue outpatient on 5 mg daily and was placed on oral lasix. Patient had slight wheezing and were given duonebs, which gave relief. Patient complained of right shoulder pain and b/l knee pain.  X Rays were negative.         On 1/2/20 this case was reviewed with Dr. Paula, the patient is medically stable and optimized for discharge. All medications were reviewed and prescriptions were sent to mutually agreed upon pharmacy. 65 y/o M with PMH of CHF, CAD, Type 2 Diabetes, HTN, denies hx of Afib CHADVASC score of 5 presents to the ED with 3 days of worsening chest pain and also with Dyspnea of exertion and shortness of breath. Patient describes pain as intermittent and states that yesterday it has been lasting longer. Patient describes pain as being punched in chest and states that pain started on the left sided chest and radiated to right side of chest. Patient states that yesterday pain was 8/10 but now pain is very mild. Patient states that shortness of breath has been becoming worse. Patient admits to feeling short of breath when sitting and walking and states that he is unable to lay flat and has to sleep with several pillows. Patient also admits to having swollen abdomen and states that he has feeling bloated lately. Patient admits to having constipation, knee pain and occasional right arm numbness which occurs when lifting arm and endorses epigastric pain. Patient denies fever, chills.         On admission, patient was admitted to [facility] and was given lasix 40 mg IV BID for acute on chronic heart failure.  EKG on admission showed new onset Afib. Cardiac enzymes were unremarkable. Cardiology was consulted. Patient was placed on heparin gtt bridge to coumadin. Echocardiogram was ordered that showed. Severe segmental left ventricular systolic dysfunction. Large aneurysm of the basal to mid inferior wall, and basal to mid inferolateral wall is noted. Large thrombus measuring about 3.1 x 2.4 cm is noted with the aneurysm cavity. Patient was rate controlled on Metoprolol succinate 25 mg. Cath and cardioversion not done due to LV thrombus. EP was consulted for severe LV dysfunction. EP recommended to reassess candidacy for ICD and reevaluate Afib burden and get a repeat TTE outpatient for further evaluation. Patient was bridged to coumadin and will continue outpatient on 5 mg daily and was placed on oral lasix. Patient had slight wheezing and were given duonebs, which gave relief. Patient complained of right shoulder pain and b/l knee pain.  X Rays were negative.         On 1/2/20 this case was reviewed with Dr. Paula, the patient is medically stable and optimized for discharge. All medications were reviewed and prescriptions were sent to mutually agreed upon pharmacy.

## 2020-01-02 NOTE — DISCHARGE NOTE PROVIDER - PROVIDER TOKENS
PROVIDER:[TOKEN:[[id number]],SCHEDULEDAPPT:[01/03/2020],SCHEDULEDAPPTTIME:[03:00 PM]],PROVIDER:[TOKEN:[[id number]],SCHEDULEDAPPT:[04/07/2020],SCHEDULEDAPPTTIME:[10:30 AM]]

## 2020-01-02 NOTE — DISCHARGE NOTE NURSING/CASE MANAGEMENT/SOCIAL WORK - NSDCPEWEB_GEN_ALL_CORE
[place] website --- [email or web address] Tobacco [email or web address] website --- [email or web address]

## 2020-01-02 NOTE — DISCHARGE NOTE PROVIDER - NSDCMRMEDTOKEN_GEN_ALL_CORE_FT
Aspirin Enteric Coated 81 mg oral delayed release tablet: 1 tab(s) orally once a day  isosorbide dinitrate 30 mg oral tablet: 1 tab(s) orally 2 times a day  Lasix 40 mg oral tablet: 1 tab(s) orally once a day  Lipitor 20 mg oral tablet: 1 tab(s) orally once a day  losartan 100 mg oral tablet: 1 tab(s) orally once a day  metFORMIN 1000 mg oral tablet: 1 tab(s) orally 2 times a day  metoprolol succinate 25 mg oral tablet, extended release: 1 tab(s) orally once a day  Ranexa 500 mg oral tablet, extended release: 1 tab(s) orally 2 times a day acetaminophen 325 mg oral tablet: 2 tab(s) orally every 6 hours, As needed, Mild Pain (1 - 3), Moderate Pain (4 - 6)  Aspirin Enteric Coated 81 mg oral delayed release tablet: 1 tab(s) orally once a day  Coumadin 5 mg oral tablet: 1 tab(s) orally once a day   glucometer (per patient&#x27;s insurance): Test blood sugars four times a day. Dispense #1 glucometer.  isosorbide dinitrate 30 mg oral tablet: 1 tab(s) orally once a day  Lasix 40 mg oral tablet: 1 tab(s) orally once a day  Lipitor 20 mg oral tablet: 1 tab(s) orally once a day  losartan 100 mg oral tablet: 1 tab(s) orally once a day  metFORMIN 1000 mg oral tablet: 1 tab(s) orally 2 times a day  metoprolol succinate 25 mg oral tablet, extended release: 1 tab(s) orally once a day  pantoprazole 40 mg oral delayed release tablet: 1 tab(s) orally once a day (before a meal)  Ranexa 500 mg oral tablet, extended release: 1 tab(s) orally once a day  test strips (per patient&#x27;s insurance): 1 application subcutaneously 4 times a day. ** Compatible with patient&#x27;s glucometer **

## 2020-01-02 NOTE — DISCHARGE NOTE NURSING/CASE MANAGEMENT/SOCIAL WORK - NSDCFUADDAPPT_GEN_ALL_CORE_FT
PLEASE FOLLOW UP WITH DR ESTRADA (CARDIOLOGIST) FOR INR CHECK ON FRIDAY 1/3/20 AT 3 PM AND FOLLOW UP APPOINTMENT ON MONDAY 1/5/20  PLEASE FOLLOW UP WITH DR REARDON (ELECTROPHYSIOLOGY) ON 4/7/20 AT 10:30 AM    Please follow up with your primary care doctor for further management of your diabetes. Please call your doctor to make an appointment.

## 2020-01-02 NOTE — DISCHARGE NOTE NURSING/CASE MANAGEMENT/SOCIAL WORK - PATIENT PORTAL LINK FT
You can access the FollowMyHealth Patient Portal offered by [facility] by registering at the following website: [email or web address]. By joining [facility]’s FollowMyHealth portal, you will also be able to view your health information using other applications (apps) compatible with our system.

## 2020-01-02 NOTE — PROGRESS NOTE ADULT - PROBLEM SELECTOR PLAN 10
DVT pps: on full AC   Transitions of Care Status:  1.  Name of PCP: ?Dr. Gonzalez appointment made  2.  PCP Contacted on Admission: [ ] Y    [x] N    3.  PCP contacted at Discharge: [ x] Y    [ ] N    [ ] N/A Dr Gonzalez  4.  Post-Discharge Appointment Date and Location: Dr Gonzalez tomorrow at 3Pm  5.  Summary of Handoff given to PCP:

## 2020-01-02 NOTE — PROGRESS NOTE ADULT - SUBJECTIVE AND OBJECTIVE BOX
Micky Simon MD  Interventional Cardiology / Advance Heart Failure and Cardiac Transplant Specialist  [place] Office : [address] [place] [address] [place] [address]  Tel:   [place] Office : [address] [facility] [place] [address]  Tel: [phone]  Cell : [phone]    Pt is lying in bed comfortable not in distress, no chest pains no SOB no palpitations  	  MEDICATIONS:  aspirin enteric coated 81 milliGRAM(s) Oral daily  furosemide    Tablet 40 milliGRAM(s) Oral daily  heparin  Infusion.  Unit(s)/Hr IV Continuous <Continuous>  heparin  Injectable 8000 Unit(s) IV Push every 6 hours PRN  heparin  Injectable 4000 Unit(s) IV Push every 6 hours PRN  isosorbide   mononitrate ER Tablet (IMDUR) 30 milliGRAM(s) Oral daily  losartan 100 milliGRAM(s) Oral daily  metoprolol succinate ER 25 milliGRAM(s) Oral daily  ranolazine 500 milliGRAM(s) Oral daily  warfarin 2.5 milliGRAM(s) Oral once      ALBUTerol    90 MICROgram(s) HFA Inhaler 2 Puff(s) Inhalation every 6 hours  benzonatate 100 milliGRAM(s) Oral every 8 hours PRN    acetaminophen   Tablet .. 650 milliGRAM(s) Oral every 6 hours PRN    pantoprazole    Tablet 40 milliGRAM(s) Oral before breakfast  polyethylene glycol 3350 17 Gram(s) Oral daily    atorvastatin 20 milliGRAM(s) Oral at bedtime  dextrose 40% Gel 15 Gram(s) Oral once PRN  dextrose 50% Injectable 12.5 Gram(s) IV Push once  dextrose 50% Injectable 25 Gram(s) IV Push once  dextrose 50% Injectable 25 Gram(s) IV Push once  glucagon  Injectable 1 milliGRAM(s) IntraMuscular once PRN  insulin lispro (HumaLOG) corrective regimen sliding scale   SubCutaneous three times a day before meals  insulin lispro (HumaLOG) corrective regimen sliding scale   SubCutaneous at bedtime    benzocaine 15 mG/menthol 3.6 mG (Sugar-Free) Lozenge 1 Lozenge Oral four times a day PRN  dextrose 5%. 1000 milliLiter(s) IV Continuous <Continuous>      PAST MEDICAL/SURGICAL HISTORY  PAST MEDICAL & SURGICAL HISTORY:  Heart problem  Hyperlipidemia  Hypertension  No significant past surgical history      SOCIAL HISTORY: Substance Use (street drugs): ( x ) never used  (  ) other:    FAMILY HISTORY:  No pertinent family history in first degree relatives      REVIEW OF SYSTEMS:  CONSTITUTIONAL: No fever, weight loss, or fatigue  EYES: No eye pain, visual disturbances, or discharge  ENMT:  No difficulty hearing, tinnitus, vertigo; No sinus or throat pain  BREASTS: No pain, masses, or nipple discharge  GASTROINTESTINAL: No abdominal or epigastric pain. No nausea, vomiting, or hematemesis; No diarrhea or constipation. No melena or hematochezia.  GENITOURINARY: No dysuria, frequency, hematuria, or incontinence  NEUROLOGICAL: No headaches, memory loss, loss of strength, numbness, or tremors  ENDOCRINE: No heat or cold intolerance; No hair loss  MUSCULOSKELETAL: No joint pain or swelling; No muscle, back, or extremity pain  PSYCHIATRIC: No depression, anxiety, mood swings, or difficulty sleeping  HEME/LYMPH: No easy bruising, or bleeding gums  All others negative    PHYSICAL EXAM:  T(C): 36.3 (12-31-19 @ 05:48), Max: 36.6 (12-30-19 @ 12:50)  HR: 68 (12-31-19 @ 05:48) (68 - 77)  BP: 141/78 (12-31-19 @ 05:48) (123/82 - 150/84)  RR: 18 (12-31-19 @ 05:48) (18 - 18)  SpO2: 98% (12-31-19 @ 05:48) (98% - 100%)  Wt(kg): --  I&O's Summary    30 Dec 2019 07:01  -  31 Dec 2019 07:00  --------------------------------------------------------  IN: 420 mL / OUT: 1600 mL / NET: -1180 mL          GENERAL: NAD  EYES: EOMI, PERRLA, conjunctiva and sclera clear  ENMT: No tonsillar erythema, exudates, or enlargement; Moist mucous membranes, Good dentition, No lesions  Cardiovascular: Normal S1 S2, No JVD, No murmurs, No edema  Respiratory: Lungs clear to auscultation	  Gastrointestinal:  Soft, Non-tender, + BS	  Extremities: Normal range of motion, No clubbing, cyanosis or edema  LYMPH: No lymphadenopathy noted  NERVOUS SYSTEM:  Alert & Oriented X3, Good concentration; Motor Strength 5/5 B/L upper and lower extremities; DTRs 2+ intact and symmetric                                    14.1   7.03  )-----------( 179      ( 31 Dec 2019 06:30 )             44.4     12-31    138  |  103  |  26<H>  ----------------------------<  202<H>  4.5   |  19<L>  |  0.92    Ca    9.0      31 Dec 2019 07:30  Phos  2.2     12-31  Mg     2.0     12-31      proBNP:   Lipid Profile:   HgA1c:   TSH:     Consultant(s) Notes Reviewed:  [x ] YES  [ ] NO    Care Discussed with Consultants/Other Providers [ x] YES  [ ] NO    Imaging Personally Reviewed independently:  [x] YES  [ ] NO    All labs, radiologic studies, vitals, orders and medications list reviewed. Patient is seen and examined at bedside. Case discussed with medical team.
Patient is a 64y old  Male who presents with a chief complaint of SOB (30 Dec 2019 09:35)      SUBJECTIVE / OVERNIGHT EVENTS: No complaints, denies any pain, no pain at right shoulder     MEDICATIONS  (STANDING):  ALBUTerol    90 MICROgram(s) HFA Inhaler 2 Puff(s) Inhalation every 6 hours  aspirin enteric coated 81 milliGRAM(s) Oral daily  atorvastatin 20 milliGRAM(s) Oral at bedtime  dextrose 5%. 1000 milliLiter(s) (50 mL/Hr) IV Continuous <Continuous>  dextrose 50% Injectable 12.5 Gram(s) IV Push once  dextrose 50% Injectable 25 Gram(s) IV Push once  dextrose 50% Injectable 25 Gram(s) IV Push once  furosemide    Tablet 40 milliGRAM(s) Oral daily  heparin  Infusion.  Unit(s)/Hr (18 mL/Hr) IV Continuous <Continuous>  insulin lispro (HumaLOG) corrective regimen sliding scale   SubCutaneous three times a day before meals  insulin lispro (HumaLOG) corrective regimen sliding scale   SubCutaneous at bedtime  isosorbide   mononitrate ER Tablet (IMDUR) 30 milliGRAM(s) Oral daily  losartan 100 milliGRAM(s) Oral daily  metoprolol succinate ER 25 milliGRAM(s) Oral daily  pantoprazole    Tablet 40 milliGRAM(s) Oral before breakfast  polyethylene glycol 3350 17 Gram(s) Oral daily  ranolazine 500 milliGRAM(s) Oral daily  warfarin 2.5 milliGRAM(s) Oral once    MEDICATIONS  (PRN):  acetaminophen   Tablet .. 650 milliGRAM(s) Oral every 6 hours PRN Mild Pain (1 - 3), Moderate Pain (4 - 6)  benzocaine 15 mG/menthol 3.6 mG (Sugar-Free) Lozenge 1 Lozenge Oral four times a day PRN sore throat/cough  benzonatate 100 milliGRAM(s) Oral every 8 hours PRN Cough  dextrose 40% Gel 15 Gram(s) Oral once PRN Blood Glucose LESS THAN 70 milliGRAM(s)/deciliter  glucagon  Injectable 1 milliGRAM(s) IntraMuscular once PRN Glucose LESS THAN 70 milligrams/deciliter  heparin  Injectable 8000 Unit(s) IV Push every 6 hours PRN For aPTT less than 40  heparin  Injectable 4000 Unit(s) IV Push every 6 hours PRN For aPTT between 40 - 57      Vital Signs Last 24 Hrs  T(C): 36.3 (31 Dec 2019 05:48), Max: 36.6 (30 Dec 2019 12:50)  T(F): 97.3 (31 Dec 2019 05:48), Max: 97.9 (30 Dec 2019 12:50)  HR: 68 (31 Dec 2019 05:48) (68 - 77)  BP: 141/78 (31 Dec 2019 05:48) (123/82 - 150/84)  BP(mean): --  RR: 18 (31 Dec 2019 05:48) (18 - 18)  SpO2: 98% (31 Dec 2019 05:48) (98% - 100%)  CAPILLARY BLOOD GLUCOSE      POCT Blood Glucose.: 206 mg/dL (31 Dec 2019 08:55)  POCT Blood Glucose.: 260 mg/dL (30 Dec 2019 21:43)  POCT Blood Glucose.: 243 mg/dL (30 Dec 2019 17:57)  POCT Blood Glucose.: 212 mg/dL (30 Dec 2019 12:39)    I&O's Summary    30 Dec 2019 07:01  -  31 Dec 2019 07:00  --------------------------------------------------------  IN: 420 mL / OUT: 1600 mL / NET: -1180 mL        PHYSICAL EXAM:  GENERAL: NAD, well-developed  HEAD:  Atraumatic, Normocephalic  EYES: EOMI, PERRLA, conjunctiva and sclera clear  NECK: Supple, No JVD  CHEST/LUNG: Clear to auscultation bilaterally; No wheeze  HEART: Regular rate and rhythm; No murmurs, rubs, or gallops  ABDOMEN: Soft, Nontender, Nondistended; Bowel sounds present  EXTREMITIES:  2+ Peripheral Pulses, No clubbing, cyanosis, or edema  PSYCH: AAOx3  NEUROLOGY: non-focal  SKIN: No rashes or lesions    LABS:                        14.1   7.03  )-----------( 179      ( 31 Dec 2019 06:30 )             44.4     12-31    138  |  103  |  26<H>  ----------------------------<  202<H>  4.5   |  19<L>  |  0.92    Ca    9.0      31 Dec 2019 07:30  Phos  2.2     12-31  Mg     2.0     12-31      PT/INR - ( 31 Dec 2019 06:30 )   PT: 31.9 SEC;   INR: 2.71          PTT - ( 31 Dec 2019 06:30 )  PTT:85.7 SEC          RADIOLOGY & ADDITIONAL TESTS:    Imaging Personally Reviewed:    Consultant(s) Notes Reviewed:  cardiology    Care Discussed with Consultants/Other Providers:
[facility] Division of Hospital Medicine  Lawson Molina MD  Pager [phone]      Patient is a 64y old  Male who presents with a chief complaint of SOB (26 Dec 2019 11:26)      SUBJECTIVE / OVERNIGHT EVENTS: Improved SOB and leg swelling    MEDICATIONS  (STANDING):  albuterol/ipratropium for Nebulization 3 milliLiter(s) Nebulizer every 6 hours  aspirin enteric coated 81 milliGRAM(s) Oral daily  atorvastatin 20 milliGRAM(s) Oral at bedtime  benzocaine 15 mG/menthol 3.6 mG (Sugar-Free) Lozenge 1 Lozenge Oral four times a day  dextrose 5%. 1000 milliLiter(s) (50 mL/Hr) IV Continuous <Continuous>  dextrose 50% Injectable 12.5 Gram(s) IV Push once  dextrose 50% Injectable 25 Gram(s) IV Push once  dextrose 50% Injectable 25 Gram(s) IV Push once  furosemide   Injectable 40 milliGRAM(s) IV Push two times a day  heparin  Infusion.  Unit(s)/Hr (18 mL/Hr) IV Continuous <Continuous>  insulin lispro (HumaLOG) corrective regimen sliding scale   SubCutaneous three times a day before meals  insulin lispro (HumaLOG) corrective regimen sliding scale   SubCutaneous at bedtime  isosorbide   mononitrate ER Tablet (IMDUR) 30 milliGRAM(s) Oral daily  losartan 100 milliGRAM(s) Oral daily  metoprolol succinate ER 25 milliGRAM(s) Oral daily  pantoprazole    Tablet 40 milliGRAM(s) Oral before breakfast  polyethylene glycol 3350 17 Gram(s) Oral daily  ranolazine 500 milliGRAM(s) Oral daily    MEDICATIONS  (PRN):  dextrose 40% Gel 15 Gram(s) Oral once PRN Blood Glucose LESS THAN 70 milliGRAM(s)/deciliter  glucagon  Injectable 1 milliGRAM(s) IntraMuscular once PRN Glucose LESS THAN 70 milligrams/deciliter  heparin  Injectable 8000 Unit(s) IV Push every 6 hours PRN For aPTT less than 40  heparin  Injectable 4000 Unit(s) IV Push every 6 hours PRN For aPTT between 40 - 57      CAPILLARY BLOOD GLUCOSE      POCT Blood Glucose.: 213 mg/dL (27 Dec 2019 12:40)  POCT Blood Glucose.: 217 mg/dL (27 Dec 2019 08:43)  POCT Blood Glucose.: 213 mg/dL (26 Dec 2019 21:49)  POCT Blood Glucose.: 219 mg/dL (26 Dec 2019 17:41)    I&O's Summary    26 Dec 2019 07:01  -  27 Dec 2019 07:00  --------------------------------------------------------  IN: 840 mL / OUT: 2825 mL / NET: -1985 mL    27 Dec 2019 07:01  -  27 Dec 2019 13:11  --------------------------------------------------------  IN: 0 mL / OUT: 800 mL / NET: -800 mL        PHYSICAL EXAM:  Vital Signs Last 24 Hrs  T(C): 36.6 (27 Dec 2019 11:50), Max: 37.4 (26 Dec 2019 17:54)  T(F): 97.9 (27 Dec 2019 11:50), Max: 99.4 (26 Dec 2019 17:54)  HR: 79 (27 Dec 2019 11:50) (66 - 79)  BP: 147/95 (27 Dec 2019 11:50) (138/74 - 150/81)  BP(mean): --  RR: 18 (27 Dec 2019 11:50) (16 - 18)  SpO2: 96% (27 Dec 2019 11:50) (89% - 98%)    CONSTITUTIONAL: NAD  EYES: PERRLA; conjunctiva and sclera clear  ENMT: mmm  NECK: Supple, no palpable masses; no thyromegaly  RESPIRATORY: Normal respiratory effort; lungs are clear to auscultation bilaterally  CARDIOVASCULAR: Regular rate and rhythm, + S1 and S2, improving LE edema  ABDOMEN: Nontender to palpation, normoactive bowel sounds, no rebound/guarding  MUSCULOSKELETAL:  no clubbing or cyanosis of digits;   PSYCH: A+O x 3      LABS:                        14.6   9.01  )-----------( 194      ( 27 Dec 2019 06:30 )             45.1     12-27    142  |  100  |  31<H>  ----------------------------<  224<H>  3.9   |  26  |  1.23    Ca    10.1      27 Dec 2019 06:30  Mg     1.8     12-27      PTT - ( 27 Dec 2019 06:30 )  PTT:101.3 SEC
[facility] Division of Hospital Medicine  Lawson Molina MD  Pager [phone]      Patient is a 64y old  Male who presents with a chief complaint of     SUBJECTIVE / OVERNIGHT EVENTS: Pt with improved SOB. Complaining of sore throat and running nose    MEDICATIONS  (STANDING):  albuterol/ipratropium for Nebulization 3 milliLiter(s) Nebulizer every 6 hours  aspirin enteric coated 81 milliGRAM(s) Oral daily  atorvastatin 20 milliGRAM(s) Oral at bedtime  dextrose 5%. 1000 milliLiter(s) (50 mL/Hr) IV Continuous <Continuous>  dextrose 50% Injectable 12.5 Gram(s) IV Push once  dextrose 50% Injectable 25 Gram(s) IV Push once  dextrose 50% Injectable 25 Gram(s) IV Push once  furosemide   Injectable 40 milliGRAM(s) IV Push two times a day  heparin  Infusion.  Unit(s)/Hr (18 mL/Hr) IV Continuous <Continuous>  insulin lispro (HumaLOG) corrective regimen sliding scale   SubCutaneous three times a day before meals  insulin lispro (HumaLOG) corrective regimen sliding scale   SubCutaneous at bedtime  isosorbide   mononitrate ER Tablet (IMDUR) 30 milliGRAM(s) Oral daily  losartan 100 milliGRAM(s) Oral daily  metoprolol succinate ER 25 milliGRAM(s) Oral daily  pantoprazole    Tablet 40 milliGRAM(s) Oral before breakfast  polyethylene glycol 3350 17 Gram(s) Oral daily  ranolazine 500 milliGRAM(s) Oral daily    MEDICATIONS  (PRN):  dextrose 40% Gel 15 Gram(s) Oral once PRN Blood Glucose LESS THAN 70 milliGRAM(s)/deciliter  glucagon  Injectable 1 milliGRAM(s) IntraMuscular once PRN Glucose LESS THAN 70 milligrams/deciliter  heparin  Injectable 8000 Unit(s) IV Push every 6 hours PRN For aPTT less than 40  heparin  Injectable 4000 Unit(s) IV Push every 6 hours PRN For aPTT between 40 - 57      CAPILLARY BLOOD GLUCOSE      POCT Blood Glucose.: 199 mg/dL (26 Dec 2019 08:45)  POCT Blood Glucose.: 160 mg/dL (25 Dec 2019 21:36)  POCT Blood Glucose.: 220 mg/dL (25 Dec 2019 17:42)  POCT Blood Glucose.: 200 mg/dL (25 Dec 2019 12:45)    I&O's Summary    25 Dec 2019 07:01  -  26 Dec 2019 07:00  --------------------------------------------------------  IN: 560 mL / OUT: 2350 mL / NET: -1790 mL    26 Dec 2019 07:01  -  26 Dec 2019 11:27  --------------------------------------------------------  IN: 0 mL / OUT: 675 mL / NET: -675 mL        PHYSICAL EXAM:  Vital Signs Last 24 Hrs  T(C): 36.6 (26 Dec 2019 10:33), Max: 37.2 (25 Dec 2019 12:40)  T(F): 97.9 (26 Dec 2019 10:33), Max: 99 (25 Dec 2019 12:40)  HR: 71 (26 Dec 2019 10:33) (57 - 91)  BP: 143/82 (26 Dec 2019 10:33) (123/64 - 155/82)  BP(mean): --  RR: 16 (26 Dec 2019 10:33) (16 - 18)  SpO2: 96% (26 Dec 2019 10:33) (96% - 100%)    CONSTITUTIONAL: NAD  EYES: PERRLA; conjunctiva and sclera clear  ENMT: mmm  NECK: Supple, no palpable masses; no thyromegaly  RESPIRATORY: wheezing on the right lung field  CARDIOVASCULAR: Regular rate and rhythm, + S1 and S2, no LE edema  ABDOMEN: Nontender to palpation, normoactive bowel sounds, no rebound/guarding  MUSCULOSKELETAL:  no clubbing or cyanosis of digits;   PSYCH: A+O x 3       LABS:                        13.1   10.24 )-----------( 166      ( 26 Dec 2019 04:25 )             41.7     12-25    137  |  99  |  20  ----------------------------<  190<H>  4.4   |  26  |  1.08    Ca    9.5      25 Dec 2019 05:40  Phos  3.7     12-25  Mg     1.7     12-25    TPro  6.5  /  Alb  3.8  /  TBili  0.6  /  DBili  x   /  AST  22  /  ALT  23  /  AlkPhos  72  12-24    PT/INR - ( 24 Dec 2019 20:15 )   PT: 13.8 SEC;   INR: 1.24          PTT - ( 26 Dec 2019 04:25 )  PTT:100.1 SEC              Care Discussed with Consultants/Other Providers [Y/N]: Dr. aJnsen ( Cards)
Micky Simon MD  Interventional Cardiology / Advance Heart Failure and Cardiac Transplant Specialist  [place] Office : [address] [place] [address] [place] [address]  Tel:   [place] Office : [address] [facility] [place] [address]  Tel: [phone]  Cell : [phone]    Pt is lying in bed comfortable not in distress, no chest pains no SOB no palpitations  	  MEDICATIONS:  aspirin enteric coated 81 milliGRAM(s) Oral daily  furosemide   Injectable 40 milliGRAM(s) IV Push two times a day  heparin  Infusion.  Unit(s)/Hr IV Continuous <Continuous>  heparin  Injectable 8000 Unit(s) IV Push every 6 hours PRN  heparin  Injectable 4000 Unit(s) IV Push every 6 hours PRN  isosorbide   mononitrate ER Tablet (IMDUR) 30 milliGRAM(s) Oral daily  losartan 100 milliGRAM(s) Oral daily  metoprolol succinate ER 25 milliGRAM(s) Oral daily  ranolazine 500 milliGRAM(s) Oral daily      albuterol/ipratropium for Nebulization 3 milliLiter(s) Nebulizer every 6 hours      pantoprazole    Tablet 40 milliGRAM(s) Oral before breakfast  polyethylene glycol 3350 17 Gram(s) Oral daily    atorvastatin 20 milliGRAM(s) Oral at bedtime  dextrose 40% Gel 15 Gram(s) Oral once PRN  dextrose 50% Injectable 12.5 Gram(s) IV Push once  dextrose 50% Injectable 25 Gram(s) IV Push once  dextrose 50% Injectable 25 Gram(s) IV Push once  glucagon  Injectable 1 milliGRAM(s) IntraMuscular once PRN  insulin lispro (HumaLOG) corrective regimen sliding scale   SubCutaneous three times a day before meals  insulin lispro (HumaLOG) corrective regimen sliding scale   SubCutaneous at bedtime    benzocaine 15 mG/menthol 3.6 mG (Sugar-Free) Lozenge 1 Lozenge Oral four times a day  dextrose 5%. 1000 milliLiter(s) IV Continuous <Continuous>      PAST MEDICAL/SURGICAL HISTORY  PAST MEDICAL & SURGICAL HISTORY:  Heart problem  Hyperlipidemia  Hypertension  No significant past surgical history      SOCIAL HISTORY: Substance Use (street drugs): ( x ) never used  (  ) other:    FAMILY HISTORY:  No pertinent family history in first degree relatives      REVIEW OF SYSTEMS:  CONSTITUTIONAL: No fever, weight loss, or fatigue  EYES: No eye pain, visual disturbances, or discharge  ENMT:  No difficulty hearing, tinnitus, vertigo; No sinus or throat pain  BREASTS: No pain, masses, or nipple discharge  GASTROINTESTINAL: No abdominal or epigastric pain. No nausea, vomiting, or hematemesis; No diarrhea or constipation. No melena or hematochezia.  GENITOURINARY: No dysuria, frequency, hematuria, or incontinence  NEUROLOGICAL: No headaches, memory loss, loss of strength, numbness, or tremors  ENDOCRINE: No heat or cold intolerance; No hair loss  MUSCULOSKELETAL: No joint pain or swelling; No muscle, back, or extremity pain  PSYCHIATRIC: No depression, anxiety, mood swings, or difficulty sleeping  HEME/LYMPH: No easy bruising, or bleeding gums  All others negative    PHYSICAL EXAM:  T(C): 36.6 (12-27-19 @ 11:50), Max: 37.4 (12-26-19 @ 17:54)  HR: 79 (12-27-19 @ 11:50) (66 - 79)  BP: 147/95 (12-27-19 @ 11:50) (138/74 - 150/81)  RR: 18 (12-27-19 @ 11:50) (16 - 18)  SpO2: 96% (12-27-19 @ 11:50) (89% - 98%)  Wt(kg): --  I&O's Summary    26 Dec 2019 07:01  -  27 Dec 2019 07:00  --------------------------------------------------------  IN: 840 mL / OUT: 2825 mL / NET: -1985 mL    27 Dec 2019 07:01  -  27 Dec 2019 13:07  --------------------------------------------------------  IN: 0 mL / OUT: 800 mL / NET: -800 mL          GENERAL: NAD  EYES: EOMI, PERRLA, conjunctiva and sclera clear  ENMT: No tonsillar erythema, exudates, or enlargement; Moist mucous membranes, Good dentition, No lesions  Cardiovascular: Normal S1 S2, No JVD, No murmurs, No edema irregular  Respiratory: Lungs clear to auscultation	  Gastrointestinal:  Soft, Non-tender, + BS	  Extremities: Normal range of motion, No clubbing, cyanosis or edema  LYMPH: No lymphadenopathy noted  NERVOUS SYSTEM:  Alert & Oriented X3, Good concentration; Motor Strength 5/5 B/L upper and lower extremities; DTRs 2+ intact and symmetric                                    14.6   9.01  )-----------( 194      ( 27 Dec 2019 06:30 )             45.1     12-27    142  |  100  |  31<H>  ----------------------------<  224<H>  3.9   |  26  |  1.23    Ca    10.1      27 Dec 2019 06:30  Mg     1.8     12-27      proBNP: Serum Pro-Brain Natriuretic Peptide: 1066 pg/mL (12-26 @ 18:00)    Lipid Profile:   HgA1c:   TSH:     Consultant(s) Notes Reviewed:  [x ] YES  [ ] NO    Care Discussed with Consultants/Other Providers [ x] YES  [ ] NO    Imaging Personally Reviewed independently:  [x] YES  [ ] NO    All labs, radiologic studies, vitals, orders and medications list reviewed. Patient is seen and examined at bedside. Case discussed with medical team.
Micky Simon MD  Interventional Cardiology / Advance Heart Failure and Cardiac Transplant Specialist  [place] Office : [address] [place] [address] [place]. [address]  Tel:   [place] Office : [address] [facility] [place] [address]  Tel: [phone]  Cell : [phone]    Pt is lying in bed comfortable not in distress, no chest pains no SOB no palpitations  	  MEDICATIONS:  aspirin enteric coated 81 milliGRAM(s) Oral daily  furosemide   Injectable 40 milliGRAM(s) IV Push two times a day  heparin  Infusion.  Unit(s)/Hr IV Continuous <Continuous>  heparin  Injectable 8000 Unit(s) IV Push every 6 hours PRN  heparin  Injectable 4000 Unit(s) IV Push every 6 hours PRN  isosorbide   mononitrate ER Tablet (IMDUR) 30 milliGRAM(s) Oral daily  losartan 100 milliGRAM(s) Oral daily  metoprolol succinate ER 25 milliGRAM(s) Oral daily  ranolazine 500 milliGRAM(s) Oral daily  albuterol/ipratropium for Nebulization 3 milliLiter(s) Nebulizer every 6 hours  pantoprazole    Tablet 40 milliGRAM(s) Oral before breakfast  polyethylene glycol 3350 17 Gram(s) Oral daily  atorvastatin 20 milliGRAM(s) Oral at bedtime  dextrose 40% Gel 15 Gram(s) Oral once PRN  dextrose 50% Injectable 12.5 Gram(s) IV Push once  dextrose 50% Injectable 25 Gram(s) IV Push once  dextrose 50% Injectable 25 Gram(s) IV Push once  glucagon  Injectable 1 milliGRAM(s) IntraMuscular once PRN  insulin lispro (HumaLOG) corrective regimen sliding scale   SubCutaneous three times a day before meals  insulin lispro (HumaLOG) corrective regimen sliding scale   SubCutaneous at bedtime    benzocaine 15 mG/menthol 3.6 mG (Sugar-Free) Lozenge 1 Lozenge Oral four times a day  dextrose 5%. 1000 milliLiter(s) IV Continuous <Continuous>      PAST MEDICAL/SURGICAL HISTORY  PAST MEDICAL & SURGICAL HISTORY:  Heart problem  Hyperlipidemia  Hypertension  No significant past surgical history      SOCIAL HISTORY: Substance Use (street drugs): ( x ) never used  (  ) other:    FAMILY HISTORY:  No pertinent family history in first degree relatives      REVIEW OF SYSTEMS:  CONSTITUTIONAL: No fever, weight loss, or fatigue  EYES: No eye pain, visual disturbances, or discharge  ENMT:  No difficulty hearing, tinnitus, vertigo; No sinus or throat pain  BREASTS: No pain, masses, or nipple discharge  GASTROINTESTINAL: No abdominal or epigastric pain. No nausea, vomiting, or hematemesis; No diarrhea or constipation. No melena or hematochezia.  GENITOURINARY: No dysuria, frequency, hematuria, or incontinence  NEUROLOGICAL: No headaches, memory loss, loss of strength, numbness, or tremors  ENDOCRINE: No heat or cold intolerance; No hair loss  MUSCULOSKELETAL: No joint pain or swelling; No muscle, back, or extremity pain  PSYCHIATRIC: No depression, anxiety, mood swings, or difficulty sleeping  HEME/LYMPH: No easy bruising, or bleeding gums  All others negative    PHYSICAL EXAM:  T(C): 37.6 (12-26-19 @ 12:19), Max: 37.6 (12-26-19 @ 12:19)  HR: 66 (12-26-19 @ 15:58) (57 - 91)  BP: 138/89 (12-26-19 @ 12:19) (136/82 - 155/82)  RR: 16 (12-26-19 @ 12:19) (16 - 18)  SpO2: 97% (12-26-19 @ 12:19) (96% - 100%)  Wt(kg): --  I&O's Summary    25 Dec 2019 07:01  -  26 Dec 2019 07:00  --------------------------------------------------------  IN: 560 mL / OUT: 2350 mL / NET: -1790 mL    26 Dec 2019 07:01  -  26 Dec 2019 16:11  --------------------------------------------------------  IN: 0 mL / OUT: 1125 mL / NET: -1125 mL          GENERAL: NAD  EYES: EOMI, PERRLA, conjunctiva and sclera clear  ENMT: No tonsillar erythema, exudates, or enlargement; Moist mucous membranes, Good dentition, No lesions  Cardiovascular: Normal S1 S2, No JVD, No murmurs, No edema  Respiratory: Lungs clear to auscultation	  Gastrointestinal:  Soft, Non-tender, + BS	  Extremities: Normal range of motion, No clubbing, cyanosis or edema  LYMPH: No lymphadenopathy noted  NERVOUS SYSTEM:  Alert & Oriented X3, Good concentration; Motor Strength 5/5 B/L upper and lower extremities; DTRs 2+ intact and symmetric                                    13.1   10.24 )-----------( 166      ( 26 Dec 2019 04:25 )             41.7     12-25    137  |  99  |  20  ----------------------------<  190<H>  4.4   |  26  |  1.08    Ca    9.5      25 Dec 2019 05:40  Phos  3.7     12-25  Mg     1.7     12-25    TPro  6.5  /  Alb  3.8  /  TBili  0.6  /  DBili  x   /  AST  22  /  ALT  23  /  AlkPhos  72  12-24    proBNP:   Lipid Profile:   HgA1c: Hemoglobin A1C, Whole Blood: 8.4 % (12-26 @ 04:25)    TSH:     Consultant(s) Notes Reviewed:  [x ] YES  [ ] NO    Care Discussed with Consultants/Other Providers [ x] YES  [ ] NO    Imaging Personally Reviewed independently:  [x] YES  [ ] NO    All labs, radiologic studies, vitals, orders and medications list reviewed. Patient is seen and examined at bedside. Case discussed with medical team.
Micky Simon MD  Interventional Cardiology / Endovascular Specialist  [place] Office : [address] [place] [address] [place]. [address]  Tel:   [place] Office : [address] [facility] [place] [address]  Tel: [phone]  Cell : [phone]    HISTORY OF PRESENTING ILLNESS:    Pt is lying in bed comfortable not in distress, no chest pains no SOB no palpitations  	  MEDICATIONS:  aspirin enteric coated 81 milliGRAM(s) Oral daily  furosemide    Tablet 40 milliGRAM(s) Oral two times a day  heparin  Infusion.  Unit(s)/Hr IV Continuous <Continuous>  heparin  Injectable 8000 Unit(s) IV Push every 6 hours PRN  heparin  Injectable 4000 Unit(s) IV Push every 6 hours PRN  isosorbide   mononitrate ER Tablet (IMDUR) 30 milliGRAM(s) Oral daily  losartan 100 milliGRAM(s) Oral daily  metoprolol succinate ER 25 milliGRAM(s) Oral daily  ranolazine 500 milliGRAM(s) Oral daily  warfarin 7.5 milliGRAM(s) Oral once  ALBUTerol    90 MICROgram(s) HFA Inhaler 2 Puff(s) Inhalation every 6 hours  benzonatate 100 milliGRAM(s) Oral every 8 hours PRN  pantoprazole    Tablet 40 milliGRAM(s) Oral before breakfast  polyethylene glycol 3350 17 Gram(s) Oral daily  atorvastatin 20 milliGRAM(s) Oral at bedtime  dextrose 40% Gel 15 Gram(s) Oral once PRN  dextrose 50% Injectable 12.5 Gram(s) IV Push once  dextrose 50% Injectable 25 Gram(s) IV Push once  dextrose 50% Injectable 25 Gram(s) IV Push once  glucagon  Injectable 1 milliGRAM(s) IntraMuscular once PRN  insulin lispro (HumaLOG) corrective regimen sliding scale   SubCutaneous three times a day before meals  insulin lispro (HumaLOG) corrective regimen sliding scale   SubCutaneous at bedtime  benzocaine 15 mG/menthol 3.6 mG (Sugar-Free) Lozenge 1 Lozenge Oral four times a day PRN  dextrose 5%. 1000 milliLiter(s) IV Continuous <Continuous>      PAST MEDICAL/SURGICAL HISTORY  PAST MEDICAL & SURGICAL HISTORY:  Heart problem  Hyperlipidemia  Hypertension  No significant past surgical history      SOCIAL HISTORY: Substance Use (street drugs): ( x ) never used  (  ) other:    FAMILY HISTORY:  No pertinent family history in first degree relatives      REVIEW OF SYSTEMS:  CONSTITUTIONAL: No fever, weight loss, or fatigue  EYES: No eye pain, visual disturbances, or discharge  ENMT:  No difficulty hearing, tinnitus, vertigo; No sinus or throat pain  BREASTS: No pain, masses, or nipple discharge  GASTROINTESTINAL: No abdominal or epigastric pain. No nausea, vomiting, or hematemesis; No diarrhea or constipation. No melena or hematochezia.  GENITOURINARY: No dysuria, frequency, hematuria, or incontinence  NEUROLOGICAL: No headaches, memory loss, loss of strength, numbness, or tremors  ENDOCRINE: No heat or cold intolerance; No hair loss  MUSCULOSKELETAL: No joint pain or swelling; No muscle, back, or extremity pain  PSYCHIATRIC: No depression, anxiety, mood swings, or difficulty sleeping  HEME/LYMPH: No easy bruising, or bleeding gums  All others negative    PHYSICAL EXAM:  T(C): 36.7 (12-29-19 @ 13:10), Max: 36.7 (12-29-19 @ 13:10)  HR: 65 (12-29-19 @ 13:10) (65 - 72)  BP: 138/67 (12-29-19 @ 13:10) (138/67 - 155/83)  RR: 17 (12-29-19 @ 13:10) (17 - 18)  SpO2: 100% (12-29-19 @ 13:10) (100% - 100%)  Wt(kg): --  I&O's Summary    28 Dec 2019 07:01  -  29 Dec 2019 07:00  --------------------------------------------------------  IN: 710 mL / OUT: 1400 mL / NET: -690 mL    GENERAL: NAD  EYES: EOMI, PERRLA, conjunctiva and sclera clear  ENMT: No tonsillar erythema, exudates, or enlargement; Moist mucous membranes, Good dentition, No lesions  Cardiovascular: Normal S1 S2, No JVD, No murmurs, No edema irregular  Respiratory: Lungs clear to auscultation	  Gastrointestinal:  Soft, Non-tender, + BS	  Extremities: Normal range of motion, No clubbing, cyanosis or edema  LYMPH: No lymphadenopathy noted  NERVOUS SYSTEM:  Alert & Oriented X3, Good concentration; Motor Strength 5/5 B/L upper and lower extremities; DTRs 2+ intact and symmetric                              14.1   8.99  )-----------( 197      ( 29 Dec 2019 06:00 )             44.1     12-29    141  |  103  |  37<H>  ----------------------------<  193<H>  3.7   |  24  |  1.11    Ca    9.0      29 Dec 2019 06:00  Phos  3.1     12-29  Mg     1.9     12-29      proBNP:   Lipid Profile:   HgA1c:   TSH:     Consultant(s) Notes Reviewed:  [x ] YES  [ ] NO    Care Discussed with Consultants/Other Providers [ x] YES  [ ] NO    Imaging Personally Reviewed independently:  [x] YES  [ ] NO    All labs, radiologic studies, vitals, orders and medications list reviewed. Patient is seen and examined at bedside. Case discussed with medical team.
Micky Simon MD  Interventional Cardiology / Endovascular Specialist  [place] Office : [address] [place] [address] [place] [address]  Tel:   [place] Office : [address] [facility] [place] [address]  Tel: [phone]  Cell : [phone]    HISTORY OF PRESENTING ILLNESS:    Pt is lying in bed comfortable not in distress, no chest pains no SOB no palpitations  	  MEDICATIONS:  aspirin enteric coated 81 milliGRAM(s) Oral daily  furosemide    Tablet 40 milliGRAM(s) Oral two times a day  heparin  Infusion.  Unit(s)/Hr IV Continuous <Continuous>  heparin  Injectable 8000 Unit(s) IV Push every 6 hours PRN  heparin  Injectable 4000 Unit(s) IV Push every 6 hours PRN  isosorbide   mononitrate ER Tablet (IMDUR) 30 milliGRAM(s) Oral daily  losartan 100 milliGRAM(s) Oral daily  metoprolol succinate ER 25 milliGRAM(s) Oral daily  ranolazine 500 milliGRAM(s) Oral daily  warfarin 5 milliGRAM(s) Oral once  ALBUTerol    90 MICROgram(s) HFA Inhaler 2 Puff(s) Inhalation every 6 hours  pantoprazole    Tablet 40 milliGRAM(s) Oral before breakfast  polyethylene glycol 3350 17 Gram(s) Oral daily  atorvastatin 20 milliGRAM(s) Oral at bedtime  dextrose 40% Gel 15 Gram(s) Oral once PRN  dextrose 50% Injectable 12.5 Gram(s) IV Push once  dextrose 50% Injectable 25 Gram(s) IV Push once  dextrose 50% Injectable 25 Gram(s) IV Push once  glucagon  Injectable 1 milliGRAM(s) IntraMuscular once PRN  insulin lispro (HumaLOG) corrective regimen sliding scale   SubCutaneous three times a day before meals  insulin lispro (HumaLOG) corrective regimen sliding scale   SubCutaneous at bedtime  dextrose 5%. 1000 milliLiter(s) IV Continuous <Continuous>      PAST MEDICAL/SURGICAL HISTORY  PAST MEDICAL & SURGICAL HISTORY:  Heart problem  Hyperlipidemia  Hypertension  No significant past surgical history      SOCIAL HISTORY: Substance Use (street drugs): ( x ) never used  (  ) other:    FAMILY HISTORY:  No pertinent family history in first degree relatives      REVIEW OF SYSTEMS:  CONSTITUTIONAL: No fever, weight loss, or fatigue  EYES: No eye pain, visual disturbances, or discharge  ENMT:  No difficulty hearing, tinnitus, vertigo; No sinus or throat pain  BREASTS: No pain, masses, or nipple discharge  GASTROINTESTINAL: No abdominal or epigastric pain. No nausea, vomiting, or hematemesis; No diarrhea or constipation. No melena or hematochezia.  GENITOURINARY: No dysuria, frequency, hematuria, or incontinence  NEUROLOGICAL: No headaches, memory loss, loss of strength, numbness, or tremors  ENDOCRINE: No heat or cold intolerance; No hair loss  MUSCULOSKELETAL: No joint pain or swelling; No muscle, back, or extremity pain  PSYCHIATRIC: No depression, anxiety, mood swings, or difficulty sleeping  HEME/LYMPH: No easy bruising, or bleeding gums  All others negative    PHYSICAL EXAM:  T(C): 37.1 (12-28-19 @ 13:14), Max: 37.1 (12-28-19 @ 13:14)  HR: 65 (12-28-19 @ 13:14) (65 - 72)  BP: 159/79 (12-28-19 @ 13:14) (129/81 - 159/79)  RR: 18 (12-28-19 @ 13:14) (18 - 18)  SpO2: 98% (12-28-19 @ 13:14) (96% - 98%)  Wt(kg): --  I&O's Summary    27 Dec 2019 07:01  -  28 Dec 2019 07:00  --------------------------------------------------------  IN: 820 mL / OUT: 2450 mL / NET: -1630 mL      GENERAL: NAD  EYES: EOMI, PERRLA, conjunctiva and sclera clear  ENMT: No tonsillar erythema, exudates, or enlargement; Moist mucous membranes, Good dentition, No lesions  Cardiovascular: Normal S1 S2, No JVD, No murmurs, No edema irregular  Respiratory: Lungs clear to auscultation	  Gastrointestinal:  Soft, Non-tender, + BS	  Extremities: Normal range of motion, No clubbing, cyanosis or edema  LYMPH: No lymphadenopathy noted  NERVOUS SYSTEM:  Alert & Oriented X3, Good concentration; Motor Strength 5/5 B/L upper and lower extremities; DTRs 2+ intact and symmetric                                  14.3   10.00 )-----------( 186      ( 28 Dec 2019 04:50 )             45.2     12-28    140  |  100  |  42<H>  ----------------------------<  215<H>  3.7   |  25  |  1.50<H>    Ca    9.4      28 Dec 2019 04:50  Phos  3.1     12-28  Mg     1.9     12-28      proBNP:   Lipid Profile:   HgA1c:   TSH:     Consultant(s) Notes Reviewed:  [x ] YES  [ ] NO    Care Discussed with Consultants/Other Providers [ x] YES  [ ] NO    Imaging Personally Reviewed independently:  [x] YES  [ ] NO    All labs, radiologic studies, vitals, orders and medications list reviewed. Patient is seen and examined at bedside. Case discussed with medical team.
Micky Simon MD  Interventional Cardiology / Endovascular Specialist  [place] Office : [address] [place] [address] [place] [address]  Tel:   [place] Office : [address] [facility] [place] [address]  Tel: [phone]  Cell : [phone]    HISTORY OF PRESENTING ILLNESS:    Pt is lying in bed comfortable not in distress, no chest pains no SOB no palpitations  	  MEDICATIONS:  aspirin enteric coated 81 milliGRAM(s) Oral daily  furosemide    Tablet 40 milliGRAM(s) Oral daily  isosorbide   mononitrate ER Tablet (IMDUR) 30 milliGRAM(s) Oral daily  losartan 100 milliGRAM(s) Oral daily  metoprolol succinate ER 25 milliGRAM(s) Oral daily  ranolazine 500 milliGRAM(s) Oral daily      ALBUTerol    90 MICROgram(s) HFA Inhaler 2 Puff(s) Inhalation every 6 hours  benzonatate 100 milliGRAM(s) Oral every 8 hours PRN    acetaminophen   Tablet .. 650 milliGRAM(s) Oral every 6 hours PRN    pantoprazole    Tablet 40 milliGRAM(s) Oral before breakfast  polyethylene glycol 3350 17 Gram(s) Oral daily    atorvastatin 20 milliGRAM(s) Oral at bedtime  dextrose 40% Gel 15 Gram(s) Oral once PRN  dextrose 50% Injectable 12.5 Gram(s) IV Push once  dextrose 50% Injectable 25 Gram(s) IV Push once  dextrose 50% Injectable 25 Gram(s) IV Push once  insulin lispro (HumaLOG) corrective regimen sliding scale   SubCutaneous three times a day before meals  insulin lispro (HumaLOG) corrective regimen sliding scale   SubCutaneous at bedtime    benzocaine 15 mG/menthol 3.6 mG (Sugar-Free) Lozenge 1 Lozenge Oral four times a day PRN      PAST MEDICAL/SURGICAL HISTORY  PAST MEDICAL & SURGICAL HISTORY:  Heart problem  Hyperlipidemia  Hypertension  No significant past surgical history      SOCIAL HISTORY: Substance Use (street drugs): ( x ) never used  (  ) other:    FAMILY HISTORY:  No pertinent family history in first degree relatives      REVIEW OF SYSTEMS:  CONSTITUTIONAL: No fever, weight loss, or fatigue  EYES: No eye pain, visual disturbances, or discharge  ENMT:  No difficulty hearing, tinnitus, vertigo; No sinus or throat pain  BREASTS: No pain, masses, or nipple discharge  GASTROINTESTINAL: No abdominal or epigastric pain. No nausea, vomiting, or hematemesis; No diarrhea or constipation. No melena or hematochezia.  GENITOURINARY: No dysuria, frequency, hematuria, or incontinence  NEUROLOGICAL: No headaches, memory loss, loss of strength, numbness, or tremors  ENDOCRINE: No heat or cold intolerance; No hair loss  MUSCULOSKELETAL: No joint pain or swelling; No muscle, back, or extremity pain  PSYCHIATRIC: No depression, anxiety, mood swings, or difficulty sleeping  HEME/LYMPH: No easy bruising, or bleeding gums  All others negative    PHYSICAL EXAM:  T(C): 36.5 (01-01-20 @ 12:56), Max: 37 (12-31-19 @ 17:46)  HR: 62 (01-01-20 @ 12:56) (61 - 66)  BP: 141/90 (01-01-20 @ 12:56) (117/67 - 147/93)  RR: 18 (01-01-20 @ 12:56) (16 - 19)  SpO2: 97% (01-01-20 @ 12:56) (97% - 98%)  Wt(kg): --  I&O's Summary    31 Dec 2019 07:01  -  01 Jan 2020 07:00  --------------------------------------------------------  IN: 117 mL / OUT: 500 mL / NET: -383 mL    01 Jan 2020 07:01  -  01 Jan 2020 14:40  --------------------------------------------------------  IN: 0 mL / OUT: 900 mL / NET: -900 mL    GENERAL: NAD  EYES: EOMI, PERRLA, conjunctiva and sclera clear  ENMT: No tonsillar erythema, exudates, or enlargement; Moist mucous membranes, Good dentition, No lesions  Cardiovascular: Normal S1 S2, No JVD, No murmurs, No edema  Respiratory: Lungs clear to auscultation	  Gastrointestinal:  Soft, Non-tender, + BS	  Extremities: Normal range of motion, No clubbing, cyanosis or edema  LYMPH: No lymphadenopathy noted  NERVOUS SYSTEM:  Alert & Oriented X3, Good concentration; Motor Strength 5/5 B/L upper and lower extremities; DTRs 2+ intact and symmetric                          13.9   6.99  )-----------( 191      ( 01 Jan 2020 06:00 )             43.0     01-01    137  |  103  |  28<H>  ----------------------------<  237<H>  4.3   |  22  |  1.15    Ca    9.1      01 Jan 2020 06:00  Phos  2.4     01-01  Mg     2.0     01-01      proBNP:   Lipid Profile:   HgA1c:   TSH:     Consultant(s) Notes Reviewed:  [x ] YES  [ ] NO    Care Discussed with Consultants/Other Providers [ x] YES  [ ] NO    Imaging Personally Reviewed independently:  [x] YES  [ ] NO    All labs, radiologic studies, vitals, orders and medications list reviewed. Patient is seen and examined at bedside. Case discussed with medical team.
Micky Simon MD  Interventional Cardiology / Endovascular Specialist  [place] Office : [address] [place] [address] [place] [address]  Tel:   [place] Office : [address] [facility] [place] [address]  Tel: [phone]  Cell : [phone]    HISTORY OF PRESENTING ILLNESS:    Pt is lying in bed comfortable not in distress, no chest pains no SOB no palpitations  	  MEDICATIONS:  aspirin enteric coated 81 milliGRAM(s) Oral daily  furosemide    Tablet 40 milliGRAM(s) Oral daily  isosorbide   mononitrate ER Tablet (IMDUR) 30 milliGRAM(s) Oral daily  losartan 100 milliGRAM(s) Oral daily  metoprolol succinate ER 25 milliGRAM(s) Oral daily  ranolazine 500 milliGRAM(s) Oral daily  ALBUTerol    90 MICROgram(s) HFA Inhaler 2 Puff(s) Inhalation every 6 hours  benzonatate 100 milliGRAM(s) Oral every 8 hours PRN  acetaminophen   Tablet .. 650 milliGRAM(s) Oral every 6 hours PRN  pantoprazole    Tablet 40 milliGRAM(s) Oral before breakfast  polyethylene glycol 3350 17 Gram(s) Oral daily  atorvastatin 20 milliGRAM(s) Oral at bedtime  dextrose 40% Gel 15 Gram(s) Oral once PRN  dextrose 50% Injectable 12.5 Gram(s) IV Push once  dextrose 50% Injectable 25 Gram(s) IV Push once  dextrose 50% Injectable 25 Gram(s) IV Push once  insulin glargine Injectable (LANTUS) 8 Unit(s) SubCutaneous at bedtime  insulin lispro (HumaLOG) corrective regimen sliding scale   SubCutaneous three times a day before meals  insulin lispro (HumaLOG) corrective regimen sliding scale   SubCutaneous at bedtime  benzocaine 15 mG/menthol 3.6 mG (Sugar-Free) Lozenge 1 Lozenge Oral four times a day PRN      PAST MEDICAL/SURGICAL HISTORY  PAST MEDICAL & SURGICAL HISTORY:  Heart problem  Hyperlipidemia  Hypertension  No significant past surgical history      SOCIAL HISTORY: Substance Use (street drugs): ( x ) never used  (  ) other:    FAMILY HISTORY:  No pertinent family history in first degree relatives      REVIEW OF SYSTEMS:  CONSTITUTIONAL: No fever, weight loss, or fatigue  EYES: No eye pain, visual disturbances, or discharge  ENMT:  No difficulty hearing, tinnitus, vertigo; No sinus or throat pain  BREASTS: No pain, masses, or nipple discharge  GASTROINTESTINAL: No abdominal or epigastric pain. No nausea, vomiting, or hematemesis; No diarrhea or constipation. No melena or hematochezia.  GENITOURINARY: No dysuria, frequency, hematuria, or incontinence  NEUROLOGICAL: No headaches, memory loss, loss of strength, numbness, or tremors  ENDOCRINE: No heat or cold intolerance; No hair loss  MUSCULOSKELETAL: No joint pain or swelling; No muscle, back, or extremity pain  PSYCHIATRIC: No depression, anxiety, mood swings, or difficulty sleeping  HEME/LYMPH: No easy bruising, or bleeding gums  All others negative    PHYSICAL EXAM:  T(C): 36.7 (01-02-20 @ 12:45), Max: 36.7 (01-02-20 @ 12:45)  HR: 69 (01-02-20 @ 12:45) (68 - 72)  BP: 113/63 (01-02-20 @ 12:45) (113/63 - 138/86)  RR: 18 (01-02-20 @ 12:45) (16 - 18)  SpO2: 100% (01-02-20 @ 12:45) (98% - 100%)  Wt(kg): --  I&O's Summary    01 Jan 2020 07:01  -  02 Jan 2020 07:00  --------------------------------------------------------  IN: 300 mL / OUT: 1250 mL / NET: -950 mL    02 Jan 2020 07:01  -  02 Jan 2020 13:35  --------------------------------------------------------  IN: 0 mL / OUT: 120 mL / NET: -120 mL        GENERAL: NAD  EYES: EOMI, PERRLA, conjunctiva and sclera clear  ENMT: No tonsillar erythema, exudates, or enlargement; Moist mucous membranes, Good dentition, No lesions  Cardiovascular: Normal S1 S2, No JVD, No murmurs, No edema  Respiratory: Lungs clear to auscultation	  Gastrointestinal:  Soft, Non-tender, + BS	  Extremities: Normal range of motion, No clubbing, cyanosis or edema  LYMPH: No lymphadenopathy noted  NERVOUS SYSTEM:  Alert & Oriented X3, Good concentration; Motor Strength 5/5 B/L upper and lower extremities; DTRs 2+ intact and symmetric                                  13.9   6.99  )-----------( 191      ( 01 Jan 2020 06:00 )             43.0     01-01    137  |  103  |  28<H>  ----------------------------<  237<H>  4.3   |  22  |  1.15    Ca    9.1      01 Jan 2020 06:00  Phos  2.4     01-01  Mg     2.0     01-01      proBNP:   Lipid Profile:   HgA1c:   TSH:     Consultant(s) Notes Reviewed:  [x ] YES  [ ] NO    Care Discussed with Consultants/Other Providers [ x] YES  [ ] NO    Imaging Personally Reviewed independently:  [x] YES  [ ] NO    All labs, radiologic studies, vitals, orders and medications list reviewed. Patient is seen and examined at bedside. Case discussed with medical team.
Patient is a 64y old  Male who presents with a chief complaint of Afib, systolic HF, LV thrombus (01 Jan 2020 15:14)      SUBJECTIVE / OVERNIGHT EVENTS: (+) nose bleed this morning, resolved. No more bleeding. No other complaints.     MEDICATIONS  (STANDING):  ALBUTerol    90 MICROgram(s) HFA Inhaler 2 Puff(s) Inhalation every 6 hours  aspirin enteric coated 81 milliGRAM(s) Oral daily  atorvastatin 20 milliGRAM(s) Oral at bedtime  dextrose 50% Injectable 12.5 Gram(s) IV Push once  dextrose 50% Injectable 25 Gram(s) IV Push once  dextrose 50% Injectable 25 Gram(s) IV Push once  furosemide    Tablet 40 milliGRAM(s) Oral daily  insulin glargine Injectable (LANTUS) 8 Unit(s) SubCutaneous at bedtime  insulin lispro (HumaLOG) corrective regimen sliding scale   SubCutaneous three times a day before meals  insulin lispro (HumaLOG) corrective regimen sliding scale   SubCutaneous at bedtime  isosorbide   mononitrate ER Tablet (IMDUR) 30 milliGRAM(s) Oral daily  losartan 100 milliGRAM(s) Oral daily  metoprolol succinate ER 25 milliGRAM(s) Oral daily  pantoprazole    Tablet 40 milliGRAM(s) Oral before breakfast  polyethylene glycol 3350 17 Gram(s) Oral daily  ranolazine 500 milliGRAM(s) Oral daily    MEDICATIONS  (PRN):  acetaminophen   Tablet .. 650 milliGRAM(s) Oral every 6 hours PRN Mild Pain (1 - 3), Moderate Pain (4 - 6)  benzocaine 15 mG/menthol 3.6 mG (Sugar-Free) Lozenge 1 Lozenge Oral four times a day PRN sore throat/cough  benzonatate 100 milliGRAM(s) Oral every 8 hours PRN Cough  dextrose 40% Gel 15 Gram(s) Oral once PRN Blood Glucose LESS THAN 70 milliGRAM(s)/deciliter      Vital Signs Last 24 Hrs  T(C): 36.6 (02 Jan 2020 06:26), Max: 36.6 (01 Jan 2020 21:01)  T(F): 97.8 (02 Jan 2020 06:26), Max: 97.8 (01 Jan 2020 21:01)  HR: 68 (02 Jan 2020 06:26) (62 - 72)  BP: 134/80 (02 Jan 2020 06:26) (134/80 - 141/90)  BP(mean): --  RR: 16 (02 Jan 2020 06:26) (16 - 18)  SpO2: 98% (02 Jan 2020 06:26) (97% - 100%)  CAPILLARY BLOOD GLUCOSE      POCT Blood Glucose.: 226 mg/dL (02 Jan 2020 08:28)  POCT Blood Glucose.: 163 mg/dL (01 Jan 2020 21:33)  POCT Blood Glucose.: 273 mg/dL (01 Jan 2020 17:39)  POCT Blood Glucose.: 195 mg/dL (01 Jan 2020 12:53)    I&O's Summary    01 Jan 2020 07:01  -  02 Jan 2020 07:00  --------------------------------------------------------  IN: 300 mL / OUT: 1250 mL / NET: -950 mL    02 Jan 2020 07:01  -  02 Jan 2020 11:50  --------------------------------------------------------  IN: 0 mL / OUT: 120 mL / NET: -120 mL        PHYSICAL EXAM:  GENERAL: NAD, well-developed  HEAD:  Atraumatic, Normocephalic  EYES: EOMI, PERRLA, conjunctiva and sclera clear  NECK: Supple, No JVD  CHEST/LUNG: Clear to auscultation bilaterally; No wheeze  HEART: Regular rate and rhythm; No murmurs, rubs, or gallops  ABDOMEN: Soft, Nontender, Nondistended; Bowel sounds present  EXTREMITIES:  2+ Peripheral Pulses, No clubbing, cyanosis, or edema  PSYCH: AAOx3  NEUROLOGY: non-focal  SKIN: No rashes or lesions    LABS:                        13.9   6.99  )-----------( 191      ( 01 Jan 2020 06:00 )             43.0     01-01    137  |  103  |  28<H>  ----------------------------<  237<H>  4.3   |  22  |  1.15    Ca    9.1      01 Jan 2020 06:00  Phos  2.4     01-01  Mg     2.0     01-01      PT/INR - ( 02 Jan 2020 06:38 )   PT: 26.8 SEC;   INR: 2.29          PTT - ( 01 Jan 2020 06:00 )  PTT:96.3 SEC          RADIOLOGY & ADDITIONAL TESTS:    Imaging Personally Reviewed:    Consultant(s) Notes Reviewed:  Cardiology    Care Discussed with Consultants/Other Providers:
Patient is a 64y old  Male who presents with a chief complaint of SOB    SUBJECTIVE / OVERNIGHT EVENTS:    Care d/w daughter who provided translation    Pt denies CP or SOB, no f/c/n/v, voiding well    D/w daughter re: INR now >3 and not on clear dose, pt has no OP appt as Dr. Gonzalez away for holiday, rec staying until OP f/u can be arranged for INR checks     Tele: no events, remains in afib rate controlled     MEDICATIONS  (STANDING):  ALBUTerol    90 MICROgram(s) HFA Inhaler 2 Puff(s) Inhalation every 6 hours  aspirin enteric coated 81 milliGRAM(s) Oral daily  atorvastatin 20 milliGRAM(s) Oral at bedtime  furosemide    Tablet 40 milliGRAM(s) Oral daily  insulin lispro (HumaLOG) corrective regimen sliding scale   SubCutaneous three times a day before meals  insulin lispro (HumaLOG) corrective regimen sliding scale   SubCutaneous at bedtime  isosorbide   mononitrate ER Tablet (IMDUR) 30 milliGRAM(s) Oral daily  losartan 100 milliGRAM(s) Oral daily  metoprolol succinate ER 25 milliGRAM(s) Oral daily  pantoprazole    Tablet 40 milliGRAM(s) Oral before breakfast  polyethylene glycol 3350 17 Gram(s) Oral daily  ranolazine 500 milliGRAM(s) Oral daily    MEDICATIONS  (PRN):  acetaminophen   Tablet .. 650 milliGRAM(s) Oral every 6 hours PRN Mild Pain (1 - 3), Moderate Pain (4 - 6)  benzocaine 15 mG/menthol 3.6 mG (Sugar-Free) Lozenge 1 Lozenge Oral four times a day PRN sore throat/cough  benzonatate 100 milliGRAM(s) Oral every 8 hours PRN Cough  dextrose 40% Gel 15 Gram(s) Oral once PRN Blood Glucose LESS THAN 70 milliGRAM(s)/deciliter    T(C): 36.5 (01-01-20 @ 12:56), Max: 37 (12-31-19 @ 17:46)  HR: 62 (01-01-20 @ 12:56) (61 - 66)  BP: 141/90 (01-01-20 @ 12:56) (117/67 - 147/93)  RR: 18 (01-01-20 @ 12:56) (16 - 19)  SpO2: 97% (01-01-20 @ 12:56) (97% - 98%)    CAPILLARY BLOOD GLUCOSE  POCT Blood Glucose.: 195 mg/dL (01 Jan 2020 12:53)  POCT Blood Glucose.: 296 mg/dL (01 Jan 2020 08:58)  POCT Blood Glucose.: 266 mg/dL (31 Dec 2019 21:40)  POCT Blood Glucose.: 149 mg/dL (31 Dec 2019 17:45)    I&O's Summary    31 Dec 2019 07:01  -  01 Jan 2020 07:00  --------------------------------------------------------  IN: 117 mL / OUT: 500 mL / NET: -383 mL    01 Jan 2020 07:01  -  01 Jan 2020 15:19  --------------------------------------------------------  IN: 0 mL / OUT: 900 mL / NET: -900 mL    PHYSICAL EXAM:  GENERAL: NAD, well-developed  CHEST/LUNG: Clear to auscultation bilaterally; No wheeze  HEART: irregular; No murmurs, rubs, or gallops  ABDOMEN: Soft, Nontender, Nondistended; Bowel sounds present  EXTREMITIES:   warm and well perfused, No clubbing, cyanosis, or edema  PSYCH: AAOx3  NEUROLOGY: non-focal  SKIN: No rashes or lesions    LABS:                        13.9   6.99  )-----------( 191      ( 01 Jan 2020 06:00 )             43.0     01-01    137  |  103  |  28<H>  ----------------------------<  237<H>  4.3   |  22  |  1.15    Ca    9.1      01 Jan 2020 06:00  Phos  2.4     01-01  Mg     2.0     01-01    PT/INR - ( 01 Jan 2020 06:00 )   PT: 37.7 SEC;   INR: 3.27     PTT - ( 01 Jan 2020 06:00 )  PTT:96.3 SEC    Consultant(s) Notes Reviewed:  cards  Care Discussed with Consultants/Other Providers:
Patient is a 64y old  Male who presents with a chief complaint of SOB (27 Dec 2019 13:11)      SUBJECTIVE / OVERNIGHT EVENTS: feels close to baseline, SOB resolved     ROS:  No HA/DZ  No Vision changes   No CP, SOB  No N/V/D  No Edema  No Rash  NO weakness, numbness    MEDICATIONS  (STANDING):  ALBUTerol    90 MICROgram(s) HFA Inhaler 2 Puff(s) Inhalation every 6 hours  aspirin enteric coated 81 milliGRAM(s) Oral daily  atorvastatin 20 milliGRAM(s) Oral at bedtime  dextrose 5%. 1000 milliLiter(s) (50 mL/Hr) IV Continuous <Continuous>  dextrose 50% Injectable 12.5 Gram(s) IV Push once  dextrose 50% Injectable 25 Gram(s) IV Push once  dextrose 50% Injectable 25 Gram(s) IV Push once  furosemide    Tablet 40 milliGRAM(s) Oral two times a day  heparin  Infusion.  Unit(s)/Hr (18 mL/Hr) IV Continuous <Continuous>  insulin lispro (HumaLOG) corrective regimen sliding scale   SubCutaneous three times a day before meals  insulin lispro (HumaLOG) corrective regimen sliding scale   SubCutaneous at bedtime  isosorbide   mononitrate ER Tablet (IMDUR) 30 milliGRAM(s) Oral daily  losartan 100 milliGRAM(s) Oral daily  metoprolol succinate ER 25 milliGRAM(s) Oral daily  pantoprazole    Tablet 40 milliGRAM(s) Oral before breakfast  polyethylene glycol 3350 17 Gram(s) Oral daily  ranolazine 500 milliGRAM(s) Oral daily  warfarin 5 milliGRAM(s) Oral once    MEDICATIONS  (PRN):  dextrose 40% Gel 15 Gram(s) Oral once PRN Blood Glucose LESS THAN 70 milliGRAM(s)/deciliter  glucagon  Injectable 1 milliGRAM(s) IntraMuscular once PRN Glucose LESS THAN 70 milligrams/deciliter  heparin  Injectable 8000 Unit(s) IV Push every 6 hours PRN For aPTT less than 40  heparin  Injectable 4000 Unit(s) IV Push every 6 hours PRN For aPTT between 40 - 57      T(C): 36.4 (12-28-19 @ 05:59)  HR: 72 (12-28-19 @ 05:59)  BP: 134/83 (12-28-19 @ 05:59)  RR: 18 (12-28-19 @ 05:59)  SpO2: 96% (12-28-19 @ 05:59)  CAPILLARY BLOOD GLUCOSE      POCT Blood Glucose.: 265 mg/dL (28 Dec 2019 12:48)  POCT Blood Glucose.: 178 mg/dL (28 Dec 2019 09:03)  POCT Blood Glucose.: 162 mg/dL (27 Dec 2019 21:39)  POCT Blood Glucose.: 178 mg/dL (27 Dec 2019 18:10)    I&O's Summary    27 Dec 2019 07:01  -  28 Dec 2019 07:00  --------------------------------------------------------  IN: 820 mL / OUT: 2450 mL / NET: -1630 mL        PHYSICAL EXAM:  GENERAL: NAD, well-developed, AOx3  HEAD:  Atraumatic, Normocephalic  EYES: EOMI, PERRL, conjunctiva and sclera clear  NECK: Supple, No JVD  CHEST/LUNG: Clear to auscultation bilaterally  HEART: Regular rate and rhythm; No murmurs, rubs, or gallops, No Edema  ABDOMEN: Soft, Nontender, Nondistended; Bowel sounds present  EXTREMITIES:  2+ Peripheral Pulses, No clubbing, cyanosis  PSYCH: No SI/HI  NEUROLOGY: non-focal  SKIN: No rashes or lesions    LABS:                        14.3   10.00 )-----------( 186      ( 28 Dec 2019 04:50 )             45.2     12-28    140  |  100  |  42<H>  ----------------------------<  215<H>  3.7   |  25  |  1.50<H>    Ca    9.4      28 Dec 2019 04:50  Phos  3.1     12-28  Mg     1.9     12-28      PT/INR - ( 28 Dec 2019 10:45 )   PT: 13.6 SEC;   INR: 1.19          PTT - ( 28 Dec 2019 04:50 )  PTT:73.1 SEC              RADIOLOGY & ADDITIONAL TESTS:    Imaging Personally Reviewed:    Consultant(s) Notes Reviewed:      Care Discussed with Consultants/Other Providers:
Patient is a 64y old  Male who presents with a chief complaint of SOB (27 Dec 2019 13:11)      SUBJECTIVE / OVERNIGHT EVENTS: feels well, refused  services, had me dw her daughter over the phone     ROS:  No HA/DZ  No Vision changes   No CP, SOB  No N/V/D  No Edema  No Rash  NO weakness, numbness    MEDICATIONS  (STANDING):  ALBUTerol    90 MICROgram(s) HFA Inhaler 2 Puff(s) Inhalation every 6 hours  aspirin enteric coated 81 milliGRAM(s) Oral daily  atorvastatin 20 milliGRAM(s) Oral at bedtime  dextrose 5%. 1000 milliLiter(s) (50 mL/Hr) IV Continuous <Continuous>  dextrose 50% Injectable 12.5 Gram(s) IV Push once  dextrose 50% Injectable 25 Gram(s) IV Push once  dextrose 50% Injectable 25 Gram(s) IV Push once  furosemide    Tablet 40 milliGRAM(s) Oral two times a day  heparin  Infusion.  Unit(s)/Hr (18 mL/Hr) IV Continuous <Continuous>  insulin lispro (HumaLOG) corrective regimen sliding scale   SubCutaneous three times a day before meals  insulin lispro (HumaLOG) corrective regimen sliding scale   SubCutaneous at bedtime  isosorbide   mononitrate ER Tablet (IMDUR) 30 milliGRAM(s) Oral daily  losartan 100 milliGRAM(s) Oral daily  metoprolol succinate ER 25 milliGRAM(s) Oral daily  pantoprazole    Tablet 40 milliGRAM(s) Oral before breakfast  polyethylene glycol 3350 17 Gram(s) Oral daily  ranolazine 500 milliGRAM(s) Oral daily  warfarin 7.5 milliGRAM(s) Oral once    MEDICATIONS  (PRN):  benzocaine 15 mG/menthol 3.6 mG (Sugar-Free) Lozenge 1 Lozenge Oral four times a day PRN sore throat/cough  benzonatate 100 milliGRAM(s) Oral every 8 hours PRN Cough  dextrose 40% Gel 15 Gram(s) Oral once PRN Blood Glucose LESS THAN 70 milliGRAM(s)/deciliter  glucagon  Injectable 1 milliGRAM(s) IntraMuscular once PRN Glucose LESS THAN 70 milligrams/deciliter  heparin  Injectable 8000 Unit(s) IV Push every 6 hours PRN For aPTT less than 40  heparin  Injectable 4000 Unit(s) IV Push every 6 hours PRN For aPTT between 40 - 57      T(C): 36.6 (12-29-19 @ 05:19)  HR: 66 (12-29-19 @ 05:19)  BP: 142/67 (12-29-19 @ 05:19)  RR: 18 (12-29-19 @ 05:19)  SpO2: 100% (12-29-19 @ 05:19)  CAPILLARY BLOOD GLUCOSE      POCT Blood Glucose.: 184 mg/dL (29 Dec 2019 08:41)  POCT Blood Glucose.: 176 mg/dL (28 Dec 2019 21:48)  POCT Blood Glucose.: 206 mg/dL (28 Dec 2019 17:47)  POCT Blood Glucose.: 265 mg/dL (28 Dec 2019 12:48)    I&O's Summary    28 Dec 2019 07:01  -  29 Dec 2019 07:00  --------------------------------------------------------  IN: 710 mL / OUT: 1400 mL / NET: -690 mL        PHYSICAL EXAM:  GENERAL: NAD, well-developed, AOx3  HEAD:  Atraumatic, Normocephalic  EYES: EOMI, PERRL, conjunctiva and sclera clear  NECK: Supple, No JVD  CHEST/LUNG: Clear to auscultation bilaterally  HEART: Regular rate and rhythm; No murmurs, rubs, or gallops, No Edema  ABDOMEN: Soft, Nontender, Nondistended; Bowel sounds present  EXTREMITIES:  2+ Peripheral Pulses, No clubbing, cyanosis  PSYCH: No SI/HI  NEUROLOGY: non-focal  SKIN: No rashes or lesions    LABS:                        14.1   8.99  )-----------( 197      ( 29 Dec 2019 06:00 )             44.1     12-29    141  |  103  |  37<H>  ----------------------------<  193<H>  3.7   |  24  |  1.11    Ca    9.0      29 Dec 2019 06:00  Phos  3.1     12-29  Mg     1.9     12-29      PT/INR - ( 29 Dec 2019 06:00 )   PT: 14.4 SEC;   INR: 1.25          PTT - ( 29 Dec 2019 06:00 )  PTT:71.6 SEC              RADIOLOGY & ADDITIONAL TESTS:    Imaging Personally Reviewed:    Consultant(s) Notes Reviewed:      Care Discussed with Consultants/Other Providers:
Patient is a 64y old  Male who presents with a chief complaint of SOB (27 Dec 2019 13:11)      SUBJECTIVE / OVERNIGHT EVENTS: pt seen and examined, chart reviewed. Only c/o pain of right should X 2 months, denies any CP or SOB    MEDICATIONS  (STANDING):  ALBUTerol    90 MICROgram(s) HFA Inhaler 2 Puff(s) Inhalation every 6 hours  aspirin enteric coated 81 milliGRAM(s) Oral daily  atorvastatin 20 milliGRAM(s) Oral at bedtime  dextrose 5%. 1000 milliLiter(s) (50 mL/Hr) IV Continuous <Continuous>  dextrose 50% Injectable 12.5 Gram(s) IV Push once  dextrose 50% Injectable 25 Gram(s) IV Push once  dextrose 50% Injectable 25 Gram(s) IV Push once  furosemide    Tablet 40 milliGRAM(s) Oral two times a day  heparin  Infusion.  Unit(s)/Hr (18 mL/Hr) IV Continuous <Continuous>  insulin lispro (HumaLOG) corrective regimen sliding scale   SubCutaneous three times a day before meals  insulin lispro (HumaLOG) corrective regimen sliding scale   SubCutaneous at bedtime  isosorbide   mononitrate ER Tablet (IMDUR) 30 milliGRAM(s) Oral daily  losartan 100 milliGRAM(s) Oral daily  metoprolol succinate ER 25 milliGRAM(s) Oral daily  pantoprazole    Tablet 40 milliGRAM(s) Oral before breakfast  polyethylene glycol 3350 17 Gram(s) Oral daily  ranolazine 500 milliGRAM(s) Oral daily    MEDICATIONS  (PRN):  benzocaine 15 mG/menthol 3.6 mG (Sugar-Free) Lozenge 1 Lozenge Oral four times a day PRN sore throat/cough  benzonatate 100 milliGRAM(s) Oral every 8 hours PRN Cough  dextrose 40% Gel 15 Gram(s) Oral once PRN Blood Glucose LESS THAN 70 milliGRAM(s)/deciliter  glucagon  Injectable 1 milliGRAM(s) IntraMuscular once PRN Glucose LESS THAN 70 milligrams/deciliter  heparin  Injectable 8000 Unit(s) IV Push every 6 hours PRN For aPTT less than 40  heparin  Injectable 4000 Unit(s) IV Push every 6 hours PRN For aPTT between 40 - 57      Vital Signs Last 24 Hrs  T(C): 36.3 (30 Dec 2019 05:41), Max: 36.7 (29 Dec 2019 13:10)  T(F): 97.4 (30 Dec 2019 05:41), Max: 98 (29 Dec 2019 13:10)  HR: 69 (30 Dec 2019 05:41) (61 - 73)  BP: 145/75 (30 Dec 2019 05:41) (124/68 - 150/80)  BP(mean): --  RR: 17 (30 Dec 2019 05:41) (17 - 17)  SpO2: 100% (30 Dec 2019 05:41) (99% - 100%)  CAPILLARY BLOOD GLUCOSE      POCT Blood Glucose.: 215 mg/dL (30 Dec 2019 08:18)  POCT Blood Glucose.: 237 mg/dL (29 Dec 2019 21:31)  POCT Blood Glucose.: 226 mg/dL (29 Dec 2019 17:36)  POCT Blood Glucose.: 230 mg/dL (29 Dec 2019 12:42)    I&O's Summary    29 Dec 2019 07:01  -  30 Dec 2019 07:00  --------------------------------------------------------  IN: 1161 mL / OUT: 2950 mL / NET: -1789 mL        PHYSICAL EXAM:  GENERAL: NAD, well-developed  HEAD:  Atraumatic, Normocephalic  EYES: EOMI, PERRLA, conjunctiva and sclera clear  NECK: Supple, No JVD  CHEST/LUNG: Clear to auscultation bilaterally; No wheeze  HEART: Irregular at 70's; (+) 2/6 systolic murmurs at LSB, no rubs, or gallops  ABDOMEN: Soft, Nontender, Nondistended; Bowel sounds present  EXTREMITIES:  2+ Peripheral Pulses, No clubbing, cyanosis, or edema, (+) mild tenderness at right shoulder, no erythema or swelling.   PSYCH: AAOx3  NEUROLOGY: non-focal  SKIN: No rashes or lesions    LABS:                        14.4   8.31  )-----------( 194      ( 30 Dec 2019 05:30 )             45.5     12-30    139  |  101  |  33<H>  ----------------------------<  290<H>  4.1   |  22  |  1.13    Ca    9.1      30 Dec 2019 05:30  Phos  2.9     12-30  Mg     2.0     12-30      PT/INR - ( 30 Dec 2019 05:30 )   PT: 20.9 SEC;   INR: 1.85          PTT - ( 30 Dec 2019 05:30 )  PTT:82.1 SEC          RADIOLOGY & ADDITIONAL TESTS:    Imaging Personally Reviewed:    Consultant(s) Notes Reviewed:    Cardiology  Care Discussed with Consultants/Other Providers:
Patient seen and evaluated today.  No significant events overnight.  Had a mild nosebleed this a.m.; otherwise, feels generally well.  Telemetry review demonstrates AFib HR: 68 (01-02-20 @ 06:26) (62 - 72); occasional PVCs and 2-4 beat runs of NSVT.  Discussed outpatient follow up and repeat 2D TTE with daughter (via phone) and patient.    MEDICATIONS:  acetaminophen   Tablet .. 650 milliGRAM(s) Oral every 6 hours PRN  ALBUTerol    90 MICROgram(s) HFA Inhaler 2 Puff(s) Inhalation every 6 hours  aspirin enteric coated 81 milliGRAM(s) Oral daily  atorvastatin 20 milliGRAM(s) Oral at bedtime  benzocaine 15 mG/menthol 3.6 mG (Sugar-Free) Lozenge 1 Lozenge Oral four times a day PRN  benzonatate 100 milliGRAM(s) Oral every 8 hours PRN  furosemide    Tablet 40 milliGRAM(s) Oral daily  insulin glargine Injectable (LANTUS) 8 Unit(s) SubCutaneous at bedtime  insulin lispro (HumaLOG) corrective regimen sliding scale   SubCutaneous three times a day before meals  insulin lispro (HumaLOG) corrective regimen sliding scale   SubCutaneous at bedtime  isosorbide   mononitrate ER Tablet (IMDUR) 30 milliGRAM(s) Oral daily  losartan 100 milliGRAM(s) Oral daily  metoprolol succinate ER 25 milliGRAM(s) Oral daily  pantoprazole    Tablet 40 milliGRAM(s) Oral before breakfast  polyethylene glycol 3350 17 Gram(s) Oral daily  ranolazine 500 milliGRAM(s) Oral daily    REVIEW OF SYSTEMS:  CONSTITUTIONAL: No fever, weight loss, or fatigue  ENT: +nosebleed, left nares  NECK: No pain or stiffness  RESPIRATORY: No cough, wheezing, chills or hemoptysis; No Shortness of Breath  CARDIOVASCULAR: No chest pain, palpitations, passing out, dizziness, or leg swelling  GASTROINTESTINAL: No abdominal or epigastric pain. No nausea, vomiting, or hematemesis; No diarrhea or constipation. No melena or hematochezia.  NEUROLOGICAL: No headaches, memory loss, loss of strength, numbness, or tremors  SKIN: No itching, burning, rashes, or lesions   PSYCHIATRIC: No depression, anxiety, mood swings, or difficulty sleeping  HEME/LYMPH: No easy bruising, or bleeding gums  ALLERGY AND IMMUNOLOGIC: No hives or eczema	  All others negative    PHYSICAL EXAM:  T(C): 36.6 (01-02-20 @ 06:26), Max: 36.6 (01-01-20 @ 21:01)  HR: 68 (01-02-20 @ 06:26) (62 - 72)  BP: 134/80 (01-02-20 @ 06:26) (134/80 - 141/90)  RR: 16 (01-02-20 @ 06:26) (16 - 18)  SpO2: 98% (01-02-20 @ 06:26) (97% - 100%)  Wt(kg): --  I&O's Summary    01 Jan 2020 07:01  -  02 Jan 2020 07:00  --------------------------------------------------------  IN: 300 mL / OUT: 1250 mL / NET: -950 mL    Appearance: Normal	  HEENT:   Normal oral mucosa, PERRL, EOMI	  Lymphatic: No lymphadenopathy  Cardiovascular: Normal S1 S2, No JVD, No murmurs, No edema  Respiratory: Mild insp wheeze, otherwise lungs clear to auscultation	  Psychiatry: A & O x 3, Mood & affect appropriate  Gastrointestinal:  Soft, Non-tender, + BS	  Skin: No rashes, No ecchymoses, No cyanosis	  Neurologic: Non-focal  Extremities: Normal range of motion, No clubbing, cyanosis or edema  Vascular: Peripheral pulses palpable 2+ bilaterally    DIAGNOSTICS:  TELEMETRY: 	  as above    LABS:	 	                          13.9   6.99  )-----------( 191      ( 01 Jan 2020 06:00 )             43.0     01-01    137  |  103  |  28<H>  ----------------------------<  237<H>  4.3   |  22  |  1.15    Ca    9.1      01 Jan 2020 06:00  Phos  2.4     01-01  Mg     2.0     01-01      proBNP: Serum Pro-Brain Natriuretic Peptide: 1066 pg/mL (12-26 @ 18:00)  Serum Pro-Brain Natriuretic Peptide: 1696 pg/mL (12-24 @ 22:07)  Serum Pro-Brain Natriuretic Peptide: 1566 pg/mL (12-24 @ 20:08)
[facility] Division of Hospital Medicine  Jillian Crabtree DO  Pager: [phone]  Other Times:  [phone]    Patient is a 64y old  Male who presents with a chief complaint of     SUBJECTIVE / OVERNIGHT EVENTS:  [employer]  services used for translation, ID# [id number]. Patient seen reporting an improvement in his dyspnea, also complaining of Rt shoulder pain/numbness that starts from his neck.  Admits that he used to smoke 1-2 packs a day for 20 yrs, quit 20 years ago.    ADDITIONAL REVIEW OF SYSTEMS:    MEDICATIONS  (STANDING):  aspirin enteric coated 81 milliGRAM(s) Oral daily  atorvastatin 20 milliGRAM(s) Oral at bedtime  dextrose 5%. 1000 milliLiter(s) (50 mL/Hr) IV Continuous <Continuous>  dextrose 50% Injectable 12.5 Gram(s) IV Push once  dextrose 50% Injectable 25 Gram(s) IV Push once  dextrose 50% Injectable 25 Gram(s) IV Push once  furosemide   Injectable 40 milliGRAM(s) IV Push two times a day  insulin lispro (HumaLOG) corrective regimen sliding scale   SubCutaneous three times a day before meals  insulin lispro (HumaLOG) corrective regimen sliding scale   SubCutaneous at bedtime  isosorbide   mononitrate ER Tablet (IMDUR) 30 milliGRAM(s) Oral daily  losartan 100 milliGRAM(s) Oral daily  metoprolol succinate ER 25 milliGRAM(s) Oral daily  pantoprazole    Tablet 40 milliGRAM(s) Oral before breakfast  polyethylene glycol 3350 17 Gram(s) Oral daily  ranolazine 500 milliGRAM(s) Oral daily    MEDICATIONS  (PRN):  dextrose 40% Gel 15 Gram(s) Oral once PRN Blood Glucose LESS THAN 70 milliGRAM(s)/deciliter  glucagon  Injectable 1 milliGRAM(s) IntraMuscular once PRN Glucose LESS THAN 70 milligrams/deciliter      CAPILLARY BLOOD GLUCOSE      POCT Blood Glucose.: 181 mg/dL (25 Dec 2019 08:27)    I&O's Summary    24 Dec 2019 07:01  -  25 Dec 2019 07:00  --------------------------------------------------------  IN: 0 mL / OUT: 400 mL / NET: -400 mL    25 Dec 2019 07:01  -  25 Dec 2019 11:35  --------------------------------------------------------  IN: 240 mL / OUT: 400 mL / NET: -160 mL        PHYSICAL EXAM:  Vital Signs Last 24 Hrs  T(C): 36.8 (25 Dec 2019 05:00), Max: 37.1 (24 Dec 2019 23:34)  T(F): 98.2 (25 Dec 2019 05:00), Max: 98.7 (24 Dec 2019 23:34)  HR: 71 (25 Dec 2019 05:00) (69 - 97)  BP: 138/73 (25 Dec 2019 05:00) (133/84 - 160/99)  BP(mean): --  RR: 18 (25 Dec 2019 05:00) (18 - 20)  SpO2: 97% (25 Dec 2019 05:00) (97% - 98%)  CONSTITUTIONAL: NAD, middle-aged man laying in bed, cooperative with exam  EYES: PERRLA; conjunctiva and sclera clear  ENMT: Moist oral mucosa, no pharyngeal injection or exudates; normal dentition  NECK: Supple, no palpable masses; no thyromegaly  RESPIRATORY: Diffuse wheezing with expiration  CARDIOVASCULAR: Regular rate and rhythm, normal S1 and S2, no murmur/rub/gallop; No lower extremity edema; Peripheral pulses are 2+ bilaterally  ABDOMEN: Nontender to palpation, normoactive bowel sounds, no rebound/guarding; No hepatosplenomegaly  MUSCLOSKELETAL:  Normal gait; no clubbing or cyanosis of digits; no joint swelling or tenderness to palpation  PSYCH: A+O to person, place, and time; affect appropriate  NEUROLOGY: CN 2-12 are intact and symmetric; no gross sensory deficits;   SKIN: No rashes; no palpable lesions    LABS:                        13.5   8.78  )-----------( 180      ( 25 Dec 2019 05:40 )             42.9     12-25    137  |  99  |  20  ----------------------------<  190<H>  4.4   |  26  |  1.08    Ca    9.5      25 Dec 2019 05:40  Phos  3.7     12-25  Mg     1.7     12-25    TPro  6.5  /  Alb  3.8  /  TBili  0.6  /  DBili  x   /  AST  22  /  ALT  23  /  AlkPhos  72  12-24    PT/INR - ( 24 Dec 2019 20:15 )   PT: 13.8 SEC;   INR: 1.24          PTT - ( 24 Dec 2019 20:15 )  PTT:29.2 SEC            RADIOLOGY & ADDITIONAL TESTS:  Results Reviewed:   Imaging Personally Reviewed:  Electrocardiogram Personally Reviewed:    COORDINATION OF CARE:  Care Discussed with Consultants/Other Providers [Y/N]:  Prior or Outpatient Records Reviewed [Y/N]:
Micky Simon MD  Interventional Cardiology / Advance Heart Failure and Cardiac Transplant Specialist  [place] Office : [address] [place] [address] [place]. [address]  Tel:   [place] Office : [address] [facility] [place] [address]  Tel: [phone]  Cell : [phone]    Pt is lying in bed comfortable not in distress, no chest pains no SOB no palpitations  	  MEDICATIONS:  aspirin enteric coated 81 milliGRAM(s) Oral daily  furosemide    Tablet 40 milliGRAM(s) Oral two times a day  heparin  Infusion.  Unit(s)/Hr IV Continuous <Continuous>  heparin  Injectable 8000 Unit(s) IV Push every 6 hours PRN  heparin  Injectable 4000 Unit(s) IV Push every 6 hours PRN  isosorbide   mononitrate ER Tablet (IMDUR) 30 milliGRAM(s) Oral daily  losartan 100 milliGRAM(s) Oral daily  metoprolol succinate ER 25 milliGRAM(s) Oral daily  ranolazine 500 milliGRAM(s) Oral daily  warfarin 5 milliGRAM(s) Oral once      ALBUTerol    90 MICROgram(s) HFA Inhaler 2 Puff(s) Inhalation every 6 hours  benzonatate 100 milliGRAM(s) Oral every 8 hours PRN    acetaminophen   Tablet .. 650 milliGRAM(s) Oral every 6 hours PRN    pantoprazole    Tablet 40 milliGRAM(s) Oral before breakfast  polyethylene glycol 3350 17 Gram(s) Oral daily    atorvastatin 20 milliGRAM(s) Oral at bedtime  dextrose 40% Gel 15 Gram(s) Oral once PRN  dextrose 50% Injectable 12.5 Gram(s) IV Push once  dextrose 50% Injectable 25 Gram(s) IV Push once  dextrose 50% Injectable 25 Gram(s) IV Push once  glucagon  Injectable 1 milliGRAM(s) IntraMuscular once PRN  insulin lispro (HumaLOG) corrective regimen sliding scale   SubCutaneous three times a day before meals  insulin lispro (HumaLOG) corrective regimen sliding scale   SubCutaneous at bedtime    benzocaine 15 mG/menthol 3.6 mG (Sugar-Free) Lozenge 1 Lozenge Oral four times a day PRN  dextrose 5%. 1000 milliLiter(s) IV Continuous <Continuous>      PAST MEDICAL/SURGICAL HISTORY  PAST MEDICAL & SURGICAL HISTORY:  Heart problem  Hyperlipidemia  Hypertension  No significant past surgical history      SOCIAL HISTORY: Substance Use (street drugs): ( x ) never used  (  ) other:    FAMILY HISTORY:  No pertinent family history in first degree relatives      REVIEW OF SYSTEMS:  CONSTITUTIONAL: No fever, weight loss, or fatigue  EYES: No eye pain, visual disturbances, or discharge  ENMT:  No difficulty hearing, tinnitus, vertigo; No sinus or throat pain  BREASTS: No pain, masses, or nipple discharge  GASTROINTESTINAL: No abdominal or epigastric pain. No nausea, vomiting, or hematemesis; No diarrhea or constipation. No melena or hematochezia.  GENITOURINARY: No dysuria, frequency, hematuria, or incontinence  NEUROLOGICAL: No headaches, memory loss, loss of strength, numbness, or tremors  ENDOCRINE: No heat or cold intolerance; No hair loss  MUSCULOSKELETAL: No joint pain or swelling; No muscle, back, or extremity pain  PSYCHIATRIC: No depression, anxiety, mood swings, or difficulty sleeping  HEME/LYMPH: No easy bruising, or bleeding gums  All others negative    PHYSICAL EXAM:  T(C): 36.6 (12-30-19 @ 12:50), Max: 36.6 (12-30-19 @ 12:50)  HR: 70 (12-30-19 @ 12:50) (61 - 73)  BP: 123/82 (12-30-19 @ 12:50) (123/82 - 150/80)  RR: 18 (12-30-19 @ 12:50) (17 - 18)  SpO2: 100% (12-30-19 @ 12:50) (99% - 100%)  Wt(kg): --  I&O's Summary    29 Dec 2019 07:01  -  30 Dec 2019 07:00  --------------------------------------------------------  IN: 1161 mL / OUT: 2950 mL / NET: -1789 mL            EYES: EOMI, PERRLA, conjunctiva and sclera clear  ENMT: No tonsillar erythema, exudates, or enlargement; Moist mucous membranes, Good dentition, No lesions  Cardiovascular: Normal S1 S2, No JVD, No murmurs, No edema  Respiratory: Lungs clear to auscultation	  Gastrointestinal:  Soft, Non-tender, + BS	  Extremities: Normal range of motion, No clubbing, cyanosis or edema  LYMPH: No lymphadenopathy noted  NERVOUS SYSTEM:  Alert & Oriented X3, Good concentration; Motor Strength 5/5 B/L upper and lower extremities; DTRs 2+ intact and symmetric                                    14.4   8.31  )-----------( 194      ( 30 Dec 2019 05:30 )             45.5     12-30    139  |  103  |  30<H>  ----------------------------<  268<H>  3.9   |  24  |  1.05    Ca    8.9      30 Dec 2019 10:05  Phos  2.6     12-30  Mg     2.0     12-30      proBNP:   Lipid Profile:   HgA1c:   TSH:     Consultant(s) Notes Reviewed:  [x ] YES  [ ] NO    Care Discussed with Consultants/Other Providers [ x] YES  [ ] NO    Imaging Personally Reviewed independently:  [x] YES  [ ] NO    All labs, radiologic studies, vitals, orders and medications list reviewed. Patient is seen and examined at bedside. Case discussed with medical team.

## 2020-01-02 NOTE — DISCHARGE NOTE PROVIDER - CARE PROVIDER_API CALL
Teto Gonzalez)  Cardiology  [address] [place] [address]  Phone: [phone]  Fax: [phone]  Scheduled Appointment: 01/03/2020 03:00 PM    Moshe Sánchez)  Cardiac Electrophysiology; Cardiovascular Disease; Internal Medicine  [id number] [place] [address], [address] [place] [address]  Phone: [phone]  Fax: [phone]  Scheduled Appointment: 04/07/2020 10:30 AM

## 2020-01-02 NOTE — PROGRESS NOTE ADULT - PROBLEM SELECTOR PLAN 3
Unclear if pt with h/o afib. Pt denies, EKG on admission with suspected Afib, CHADVASC score of 5; also has a confirmed Left ventricular thrombus  -  s/p heparin gtt, now INR >3, over last 5 days 25 mg of coumadin thus avg daily dose of 5 mg  - Cont Coumadin 5mg daily as per Cardiology  -Appointment with Dr Gonzalez made for PT/INR check on Friday

## 2020-01-02 NOTE — DISCHARGE NOTE PROVIDER - NSDCCPCAREPLAN_GEN_ALL_CORE_FT
PRINCIPAL DISCHARGE DIAGNOSIS  Diagnosis: CHF exacerbation  Assessment and Plan of Treatment: You were admitted to the hospital for heart failure exacerbation. You were given a medication called lasix through your IV to reduce the fluid. Your symptoms resolved. PLEASE FOLLOW UP WITH CARDIOLOGIST DR. ESTRADA ON FRIDAY 1/3/20 AT 3 PM AND 1/5/20 AT 4 PM FOR FURTHER MANAGEMENT AND WORK UP.      SECONDARY DISCHARGE DIAGNOSES  Diagnosis: Atrial fibrillation  Assessment and Plan of Treatment: You were found to have an abnormal heart rhythm called atrial fibrillation on your EKG. You were given a medication called heparin to thin your blood and prevent clots while in the hospital.  You were transitioned to a medication called coumadin (5 mg daily) in order to continue to thin your blood and prevent clots outpatient. You must follow up with Dr. Estrada to check a lab called INR, which measures the thinness of your blood.  You had an echocardiogram done that showed a clot in your heart and dysfunction of your heart. You were seen by electrophysiology who recommended follow up for further discussion about a possible ICD placement and your atrial fibrillation. PLEASE FOLLOW UP WITH CARDIOLOGIST DR. ESTRADA ON FRIDAY 1/3/20 AT 3 PM TO CHECK YOUR INR AND 1/5/20 AT 4 PM FOR FURTHER MANAGEMENT AND WORK UP. PLEASE FOLLOW UP WITH DR REARDON (ELECTROPHYSIOLOGIST) ON 4/7/30 AT 10:30 AM FOR FURTHER WORKUP AND MANAGEMENT    Diagnosis: Type 2 diabetes mellitus without complication, without long-term current use of insulin  Assessment and Plan of Treatment: Continue consistent carbohydrate diet.  Monitor blood glucose levels throughout the day before meals and at bedtime.  Record blood sugars and bring to outpatient providers appointment in order to be reviewed by your doctor for management modifications.  Be aware of diabetes management symptoms including feeling cool and clammy may be related to low glucose levels.  Feeling hot and dry may indicate high glucose levels.  If  you feel these symptoms, check your blood sugar.  Make regular podiatry appointments in order to have feet checked for wounds and toe nails cut by a doctor to prevent infections.    Diagnosis: Hypertension  Assessment and Plan of Treatment: Continue blood pressure medication regimen as directed. Monitor for any visual changes, headaches or dizziness.  Monitor blood pressure regularly.  Follow up with your Please follow up with your primary care physician after discharge for continued monitoring and management. for further management for high blood pressure.

## 2020-01-02 NOTE — PROGRESS NOTE ADULT - ASSESSMENT
EKG - Afib PVC @ 82 bpm    Echo  2017  1. Mitral annular calcification, otherwise normal mitral  valve. Moderate-severe mitral regurgitation.  2. Calcified trileaflet aortic valve with decreased  opening. Peak transaortic valve gradient equals 27 mm Hg,  mean transaortic valve gradient equals 20 mm Hg, consistent  with probable mild to moderate aortic stenosis.  3. Severe left ventricular enlargement.  4. Severe segmental left ventricular systolic dysfunction.  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  Thrombus is  visualized within the aneurysm cavity.  Endocardial  visualization enhanced with intravenous injection of echo  contrast (Definity).  5. Unable to accurately evaluate right ventricular size or  systolic function.    Cath 2017   VENTRICLES: Global left ventricular function was moderately depressed. EF  estimated was 35 %. There was a large aneurysm involving the posterobasal wall  with evidence of calcification and likely thrombus.  VALVES: MITRAL VALVE: The mitral valve was evaluated by left ventriculography.  The mitral valve exhibited moderate to severe regurgitation.  CORONARY VESSELS: The coronary circulation is right dominant.  LM:   --  LM: Angiography showed minor luminal irregularities with no flow  limiting lesions.  --  Distal left main: There was a discrete 20 % stenosis.  LAD:   --  Proximal LAD: There was a discrete 40 % stenosis.  --  Mid LAD: There was a tubular 70 % stenosis. There was ZAYRA grade 3 flow  through the vessel (brisk flow). --  D1: Angiography showed minor luminal  irregularities with no flow limiting lesions.  CX:   --  Circumflex: Angiography showed minor luminal irregularities with no  flow limiting lesions.  RCA:   --  Proximal RCA: There was a 100 % stenosis. There was ZAYRA grade 0  flow through the vessel (no flow).    a/p     1) Newonset Atrial fibrillation - Chads Vasc score 5 start on IV heparin bridge to coumadin     2) LV aneurysm and thrombus - on heparin drip with bridge to coumadin     3) Acute on chronic CHF - cont lasix 40 PO daily
65 y/o M with PMH of CHF, CAD, HTN, Type 2 Diabetes, presents to the ED with 3 days of worsening chest pain and also with Dyspnea of exertion and shortness of breath. R/o Congestive Heart failure exacerbation. On Heparin drip and coumadin for A-fib and left ventricular thrombus.
63 y/o M with PMH of CHF, CAD, HTN, Type 2 Diabetes, presents to the ED with 3 days of worsening chest pain and also with Dyspnea of exertion and shortness of breath. R/o Congestive Heart failure exacerbation.
63 y/o M with PMH of CHF, CAD, HTN, Type 2 Diabetes, presents to the ED with 3 days of worsening chest pain and also with Dyspnea of exertion and shortness of breath. R/o Congestive Heart failure exacerbation.
63 y/o M with PMH of CHF, CAD, HTN, Type 2 Diabetes, presents to the ED with 3 days of worsening chest pain and also with Dyspnea of exertion and shortness of breath. R/o Congestive Heart failure exacerbation. On Heparin drip and coumadin for A-fib and left ventricular thrombus.
63 yo M with CHF, CAD, HTN, Type 2 Diabetes, presents to the ED with 3 days of worsening chest pain and also with Dyspnea of exertion and shortness of breath. R/o Congestive Heart failure exacerbation. On Heparin drip and coumadin for A-fib and left ventricular thrombus.
63 yo M, an ex-smoker (20 pack year, quit 20 yrs ago) with a PMH of CAD (100% stenosis of pRCA), HFrEF (LVEF 35% on 2017 cath) on OMT, HLD, T2DM, HTN, mod-severe MR, and LV aneurysm and thrombus (2017) Warfarin dc'd several months for unclear reasons.  Presented to the ED on 12/24/2019 with a cc of progressive chest pain associated with BLACK, PND, pharyngitis, and dyspnea x 3 days.  Hospital course significant for newly diagnosed Afib rate controlled and ADHF, occasional PVCs and brief 4 NSVT; CHA2 DS2 VaSC, 5:    - continue GDMT, diuretics, and IV hep bridge to coumadin per Cardiology  - continue telemetry  - Keep K+ 4.0 - 4.5 & Mg 2.0 - 2.5  - repeat outpatient 2D TTE with Cardiologist  - follow up as scheduled with Dr. Sánchez on 07-Apr-2020 10:30 to reassess candidacy for ICD and reevaluate Afib burden  - d/w EP attending, Dr. Sánchez
65 y/o M with PMH of CHF, CAD, HTN, Type 2 Diabetes, presents to the ED with 3 days of worsening chest pain and also with Dyspnea of exertion and shortness of breath. R/o Congestive Heart failure exacerbation.
65 yo M with CHF, CAD, HTN, Type 2 Diabetes, presents to the ED with 3 days of worsening chest pain and also with Dyspnea of exertion and shortness of breath. R/o Congestive Heart failure exacerbation. On Heparin drip and coumadin for A-fib and left ventricular thrombus.
EKG - Afib PVC @ 82 bpm    Echo  2017  1. Mitral annular calcification, otherwise normal mitral  valve. Moderate-severe mitral regurgitation.  2. Calcified trileaflet aortic valve with decreased  opening. Peak transaortic valve gradient equals 27 mm Hg,  mean transaortic valve gradient equals 20 mm Hg, consistent  with probable mild to moderate aortic stenosis.  3. Severe left ventricular enlargement.  4. Severe segmental left ventricular systolic dysfunction.  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  Thrombus is  visualized within the aneurysm cavity.  Endocardial  visualization enhanced with intravenous injection of echo  contrast (Definity).  5. Unable to accurately evaluate right ventricular size or  systolic function.    Cath 2017   VENTRICLES: Global left ventricular function was moderately depressed. EF  estimated was 35 %. There was a large aneurysm involving the posterobasal wall  with evidence of calcification and likely thrombus.  VALVES: MITRAL VALVE: The mitral valve was evaluated by left ventriculography.  The mitral valve exhibited moderate to severe regurgitation.  CORONARY VESSELS: The coronary circulation is right dominant.  LM:   --  LM: Angiography showed minor luminal irregularities with no flow  limiting lesions.  --  Distal left main: There was a discrete 20 % stenosis.  LAD:   --  Proximal LAD: There was a discrete 40 % stenosis.  --  Mid LAD: There was a tubular 70 % stenosis. There was ZAYRA grade 3 flow  through the vessel (brisk flow). --  D1: Angiography showed minor luminal  irregularities with no flow limiting lesions.  CX:   --  Circumflex: Angiography showed minor luminal irregularities with no  flow limiting lesions.  RCA:   --  Proximal RCA: There was a 100 % stenosis. There was ZAYRA grade 0  flow through the vessel (no flow).    a/p     1) Newonset Atrial fibrillation - Chads Vasc score 5 on coumadin     2) LV aneurysm and thrombus -  INR> 2.2  5 mg  coumadin tonight     3) Acute on chronic CHF - cont lasix 40 PO daily      4) Severe LV dysfunction - EP consult, will likely need ICD as out pt after clot resolves
EKG - Afib PVC @ 82 bpm    Echo  2017  1. Mitral annular calcification, otherwise normal mitral  valve. Moderate-severe mitral regurgitation.  2. Calcified trileaflet aortic valve with decreased  opening. Peak transaortic valve gradient equals 27 mm Hg,  mean transaortic valve gradient equals 20 mm Hg, consistent  with probable mild to moderate aortic stenosis.  3. Severe left ventricular enlargement.  4. Severe segmental left ventricular systolic dysfunction.  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  Thrombus is  visualized within the aneurysm cavity.  Endocardial  visualization enhanced with intravenous injection of echo  contrast (Definity).  5. Unable to accurately evaluate right ventricular size or  systolic function.    Cath 2017   VENTRICLES: Global left ventricular function was moderately depressed. EF  estimated was 35 %. There was a large aneurysm involving the posterobasal wall  with evidence of calcification and likely thrombus.  VALVES: MITRAL VALVE: The mitral valve was evaluated by left ventriculography.  The mitral valve exhibited moderate to severe regurgitation.  CORONARY VESSELS: The coronary circulation is right dominant.  LM:   --  LM: Angiography showed minor luminal irregularities with no flow  limiting lesions.  --  Distal left main: There was a discrete 20 % stenosis.  LAD:   --  Proximal LAD: There was a discrete 40 % stenosis.  --  Mid LAD: There was a tubular 70 % stenosis. There was ZAYRA grade 3 flow  through the vessel (brisk flow). --  D1: Angiography showed minor luminal  irregularities with no flow limiting lesions.  CX:   --  Circumflex: Angiography showed minor luminal irregularities with no  flow limiting lesions.  RCA:   --  Proximal RCA: There was a 100 % stenosis. There was ZAYRA grade 0  flow through the vessel (no flow).    a/p     1) Newonset Atrial fibrillation - Chads Vasc score 5 start on IV heparin bridge to coumadin     2) LV aneurysm and thrombus - D/C heparin drip INR>3 hold coumadin tonight     3) Acute on chronic CHF - cont lasix 40 PO daily      4) Severe LV dysfunction - EP consult, will likely need ICD as out pt after clot resolves
EKG - Afib PVC @ 82 bpm    Echo  2017  1. Mitral annular calcification, otherwise normal mitral  valve. Moderate-severe mitral regurgitation.  2. Calcified trileaflet aortic valve with decreased  opening. Peak transaortic valve gradient equals 27 mm Hg,  mean transaortic valve gradient equals 20 mm Hg, consistent  with probable mild to moderate aortic stenosis.  3. Severe left ventricular enlargement.  4. Severe segmental left ventricular systolic dysfunction.  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  Thrombus is  visualized within the aneurysm cavity.  Endocardial  visualization enhanced with intravenous injection of echo  contrast (Definity).  5. Unable to accurately evaluate right ventricular size or  systolic function.    Cath 2017   VENTRICLES: Global left ventricular function was moderately depressed. EF  estimated was 35 %. There was a large aneurysm involving the posterobasal wall  with evidence of calcification and likely thrombus.  VALVES: MITRAL VALVE: The mitral valve was evaluated by left ventriculography.  The mitral valve exhibited moderate to severe regurgitation.  CORONARY VESSELS: The coronary circulation is right dominant.  LM:   --  LM: Angiography showed minor luminal irregularities with no flow  limiting lesions.  --  Distal left main: There was a discrete 20 % stenosis.  LAD:   --  Proximal LAD: There was a discrete 40 % stenosis.  --  Mid LAD: There was a tubular 70 % stenosis. There was ZAYRA grade 3 flow  through the vessel (brisk flow). --  D1: Angiography showed minor luminal  irregularities with no flow limiting lesions.  CX:   --  Circumflex: Angiography showed minor luminal irregularities with no  flow limiting lesions.  RCA:   --  Proximal RCA: There was a 100 % stenosis. There was ZAYRA grade 0  flow through the vessel (no flow).    a/p     1) Newonset Atrial fibrillation - Chads Vasc score 5 start on IV heparin bridge to coumadin     2) LV aneurysm and thrombus - on heparin drip with bridge to coumadin     3) Acute on chronic CHF - cont lasix 40 PO daily
EKG - Afib PVC @ 82 bpm    Echo  2017  1. Mitral annular calcification, otherwise normal mitral  valve. Moderate-severe mitral regurgitation.  2. Calcified trileaflet aortic valve with decreased  opening. Peak transaortic valve gradient equals 27 mm Hg,  mean transaortic valve gradient equals 20 mm Hg, consistent  with probable mild to moderate aortic stenosis.  3. Severe left ventricular enlargement.  4. Severe segmental left ventricular systolic dysfunction.  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  Thrombus is  visualized within the aneurysm cavity.  Endocardial  visualization enhanced with intravenous injection of echo  contrast (Definity).  5. Unable to accurately evaluate right ventricular size or  systolic function.    Cath 2017   VENTRICLES: Global left ventricular function was moderately depressed. EF  estimated was 35 %. There was a large aneurysm involving the posterobasal wall  with evidence of calcification and likely thrombus.  VALVES: MITRAL VALVE: The mitral valve was evaluated by left ventriculography.  The mitral valve exhibited moderate to severe regurgitation.  CORONARY VESSELS: The coronary circulation is right dominant.  LM:   --  LM: Angiography showed minor luminal irregularities with no flow  limiting lesions.  --  Distal left main: There was a discrete 20 % stenosis.  LAD:   --  Proximal LAD: There was a discrete 40 % stenosis.  --  Mid LAD: There was a tubular 70 % stenosis. There was ZAYRA grade 3 flow  through the vessel (brisk flow). --  D1: Angiography showed minor luminal  irregularities with no flow limiting lesions.  CX:   --  Circumflex: Angiography showed minor luminal irregularities with no  flow limiting lesions.  RCA:   --  Proximal RCA: There was a 100 % stenosis. There was ZAYRA grade 0  flow through the vessel (no flow).    a/p     1) Newonset Atrial fibrillation - Chads Vasc score 5 start on IV heparin bridge to coumadin     2) LV aneurysm and thrombus - on heparin drip with bridge to coumadin INR 2.7 overlap with heparin    3) Acute on chronic CHF - cont lasix 40 PO daily      4) Severe LV dysfunction - EP consult, will likely need ICD as out pt after clot resolves
EKG - Afib PVC @ 82 bpm    Echo  2017  1. Mitral annular calcification, otherwise normal mitral  valve. Moderate-severe mitral regurgitation.  2. Calcified trileaflet aortic valve with decreased  opening. Peak transaortic valve gradient equals 27 mm Hg,  mean transaortic valve gradient equals 20 mm Hg, consistent  with probable mild to moderate aortic stenosis.  3. Severe left ventricular enlargement.  4. Severe segmental left ventricular systolic dysfunction.  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  Thrombus is  visualized within the aneurysm cavity.  Endocardial  visualization enhanced with intravenous injection of echo  contrast (Definity).  5. Unable to accurately evaluate right ventricular size or  systolic function.    Cath 2017   VENTRICLES: Global left ventricular function was moderately depressed. EF  estimated was 35 %. There was a large aneurysm involving the posterobasal wall  with evidence of calcification and likely thrombus.  VALVES: MITRAL VALVE: The mitral valve was evaluated by left ventriculography.  The mitral valve exhibited moderate to severe regurgitation.  CORONARY VESSELS: The coronary circulation is right dominant.  LM:   --  LM: Angiography showed minor luminal irregularities with no flow  limiting lesions.  --  Distal left main: There was a discrete 20 % stenosis.  LAD:   --  Proximal LAD: There was a discrete 40 % stenosis.  --  Mid LAD: There was a tubular 70 % stenosis. There was ZAYRA grade 3 flow  through the vessel (brisk flow). --  D1: Angiography showed minor luminal  irregularities with no flow limiting lesions.  CX:   --  Circumflex: Angiography showed minor luminal irregularities with no  flow limiting lesions.  RCA:   --  Proximal RCA: There was a 100 % stenosis. There was ZAYRA grade 0  flow through the vessel (no flow).    a/p     1) Newonset Atrial fibrillation - Chads Vasc score 5 start on IV heparin, will get 2d echo to access LV function, consider ischemia eval, will get stress test if no LV clot    2) LV aneurysm and thrombus - will get echo with definity to r/o LV thrombus     3) Acute on chronic CHF - cont lasix IV will switch to PO tomorrow
EKG - Afib PVC @ 82 bpm    Echo  2017  1. Mitral annular calcification, otherwise normal mitral  valve. Moderate-severe mitral regurgitation.  2. Calcified trileaflet aortic valve with decreased  opening. Peak transaortic valve gradient equals 27 mm Hg,  mean transaortic valve gradient equals 20 mm Hg, consistent  with probable mild to moderate aortic stenosis.  3. Severe left ventricular enlargement.  4. Severe segmental left ventricular systolic dysfunction.  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  Thrombus is  visualized within the aneurysm cavity.  Endocardial  visualization enhanced with intravenous injection of echo  contrast (Definity).  5. Unable to accurately evaluate right ventricular size or  systolic function.    Cath 2017   VENTRICLES: Global left ventricular function was moderately depressed. EF  estimated was 35 %. There was a large aneurysm involving the posterobasal wall  with evidence of calcification and likely thrombus.  VALVES: MITRAL VALVE: The mitral valve was evaluated by left ventriculography.  The mitral valve exhibited moderate to severe regurgitation.  CORONARY VESSELS: The coronary circulation is right dominant.  LM:   --  LM: Angiography showed minor luminal irregularities with no flow  limiting lesions.  --  Distal left main: There was a discrete 20 % stenosis.  LAD:   --  Proximal LAD: There was a discrete 40 % stenosis.  --  Mid LAD: There was a tubular 70 % stenosis. There was ZAYRA grade 3 flow  through the vessel (brisk flow). --  D1: Angiography showed minor luminal  irregularities with no flow limiting lesions.  CX:   --  Circumflex: Angiography showed minor luminal irregularities with no  flow limiting lesions.  RCA:   --  Proximal RCA: There was a 100 % stenosis. There was ZAYRA grade 0  flow through the vessel (no flow).    a/p     1) Newonset Atrial fibrillation - Chads Vasc score 5 start on IV heparin, will get 2d echo to access LV function, consider ischemia eval, will get stress test if no LV clot, pt hasnt cardioverted will consider RAJAN cardioversion prior to d/c    2) LV aneurysm and thrombus - will get echo with definity to r/o LV thrombus     3) Acute on chronic CHF - cont lasix 40 PO daily from tomorrow given elevation in creat
EKG - Afib PVC @ 82 bpm    Echo  2017  1. Mitral annular calcification, otherwise normal mitral  valve. Moderate-severe mitral regurgitation.  2. Calcified trileaflet aortic valve with decreased  opening. Peak transaortic valve gradient equals 27 mm Hg,  mean transaortic valve gradient equals 20 mm Hg, consistent  with probable mild to moderate aortic stenosis.  3. Severe left ventricular enlargement.  4. Severe segmental left ventricular systolic dysfunction.  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  Thrombus is  visualized within the aneurysm cavity.  Endocardial  visualization enhanced with intravenous injection of echo  contrast (Definity).  5. Unable to accurately evaluate right ventricular size or  systolic function.    Cath 2017   VENTRICLES: Global left ventricular function was moderately depressed. EF  estimated was 35 %. There was a large aneurysm involving the posterobasal wall  with evidence of calcification and likely thrombus.  VALVES: MITRAL VALVE: The mitral valve was evaluated by left ventriculography.  The mitral valve exhibited moderate to severe regurgitation.  CORONARY VESSELS: The coronary circulation is right dominant.  LM:   --  LM: Angiography showed minor luminal irregularities with no flow  limiting lesions.  --  Distal left main: There was a discrete 20 % stenosis.  LAD:   --  Proximal LAD: There was a discrete 40 % stenosis.  --  Mid LAD: There was a tubular 70 % stenosis. There was ZAYRA grade 3 flow  through the vessel (brisk flow). --  D1: Angiography showed minor luminal  irregularities with no flow limiting lesions.  CX:   --  Circumflex: Angiography showed minor luminal irregularities with no  flow limiting lesions.  RCA:   --  Proximal RCA: There was a 100 % stenosis. There was ZAYRA grade 0  flow through the vessel (no flow).    a/p     1) Newonset Atrial fibrillation - Chads Vasc score 5 start on IV heparin, will get 2d echo to access LV function, consider ischemia eval, will get stress test if no LV clot, pt hasnt cardioverted will consider RAJAN cardioversion prior to d/c    2) LV aneurysm and thrombus - will get echo with definity to r/o LV thrombus     3) Acute on chronic CHF - cont lasix IV will switch to PO tomorrow

## 2020-01-02 NOTE — PROGRESS NOTE ADULT - ATTENDING COMMENTS
Spoke to pt and daughter at length about f/u issues, they all understood and agreed with the plan. Pt advised not to blow his nose due to risk of nose bleed while on a/c He understood and agreed with the plan.  PA spoke to Dr Gonzalez re: f/u  Total time: 35min

## 2020-05-26 ENCOUNTER — APPOINTMENT (OUTPATIENT)
Dept: ELECTROPHYSIOLOGY | Facility: CLINIC | Age: 65
End: 2020-05-26

## 2020-05-26 ENCOUNTER — APPOINTMENT (OUTPATIENT)
Dept: ELECTROPHYSIOLOGY | Facility: CLINIC | Age: 65
End: 2020-05-26
Payer: COMMERCIAL

## 2020-05-26 DIAGNOSIS — I48.19 OTHER PERSISTENT ATRIAL FIBRILLATION: ICD-10-CM

## 2020-05-26 PROCEDURE — 99214 OFFICE O/P EST MOD 30 MIN: CPT | Mod: 95

## 2020-05-26 RX ORDER — FUROSEMIDE 40 MG/1
40 TABLET ORAL
Refills: 0 | Status: ACTIVE | COMMUNITY

## 2020-05-26 RX ORDER — ISOSORBIDE MONONITRATE 30 MG/1
30 TABLET, EXTENDED RELEASE ORAL
Refills: 0 | Status: ACTIVE | COMMUNITY
Start: 2020-05-26

## 2020-05-26 RX ORDER — RANOLAZINE 500 MG/1
500 TABLET, FILM COATED, EXTENDED RELEASE ORAL DAILY
Refills: 0 | Status: ACTIVE | COMMUNITY
Start: 2020-05-26

## 2020-05-26 RX ORDER — PANTOPRAZOLE 40 MG/1
40 TABLET, DELAYED RELEASE ORAL DAILY
Refills: 0 | Status: ACTIVE | COMMUNITY
Start: 2020-05-26

## 2020-05-26 NOTE — PLAN
[FreeTextEntry1] : Chronic systolic congestive heart failure\par \par Echocardiogram possible ICD. Check status of LV thrombus. Check LV function. \par Needs blood tests: pro BNP. CMP, CBC, PT, INR. \par ECG. \par In person visit. \par Consider ICD in light of LV dysfunction.  \par \par Moshe Sánchez MD

## 2020-05-26 NOTE — HISTORY OF PRESENT ILLNESS
[Home] : at home, [unfilled] , at the time of the visit. [Medical Office: ([facility])___] : at the medical office located in  [Verbal consent obtained from patient] : the patient, [unfilled] [Time Spent: ___ minutes] : I have spent [unfilled] minutes with the patient on the telephone [FreeTextEntry1] : Teto Gonzalez MD\par \par 65 y/o M with PMH of CHF, CAD, Type 2 Diabetes, HTN, denies hx of Afib CHADVASC score of 5 presented to the ED with 3 days of worsening chest pain on December 24, 2019 and also with Dyspnea of exertion and shortness of breath. Patient describes pain as intermittent and states that yesterday it has been lasting longer. Patient described pain as being punched in chest and states that pain started on the left sided chest and radiated to right side of chest. Patient states that yesterday pain was 8/10 but now pain is very mild. Patient states that shortness of breath has been becoming worse. Patient admits to feeling short of breath when sitting and walking and states that he is unable to lay flat and has to sleep with several pillows. Patient also admits to having swollen abdomen and states that he has feeling bloated lately. Patient admits to having \par constipation, knee pain and occasional right arm numbness which occurs when lifting arm and endorses epigastric pain. Patient denies fever, chills. On admission, patient was admitted to tele and was given lasix 40 mg IV BID for acute on chronic heart failure.  EKG on admission showed new onset Afib. \par Cardiac enzymes were unremarkable. Cardiology was consulted. Patient was placed on heparin gtt bridge to coumadin. Echocardiogram was ordered that showed. Severe segmental left ventricular systolic dysfunction. Large aneurysm of the basal to mid inferior wall, and basal to mid inferolateral wall is noted. Large thrombus measuring about 3.1 x 2.4 cm is noted with the aneurysm cavity. Patient was rate controlled on Metoprolol succinate 25 mg. Cath and cardioversion not done due to LV thrombus. EP was consulted for severe LV dysfunction. EP recommended to reassess candidacy for ICD and reevaluate Afib burden and get a repeat TTE outpatient for further evaluation. Patient was bridged to coumadin and will continue outpatient on 5 mg daily and was placed in oral lasix. Patient had slight wheezing and were given duonebs, which gave relief. Patient complained of right shoulder pain and b/l knee pain.  X Rays \par were negative. \par \par On 1/2/20 this case was reviewed with Dr. Paula, the patient is medically stable \par and optimized for discharge. All medications were reviewed and prescriptions \par were sent to mutually agreed upon pharmacy. \par \par Since discharge he has been short of breath and weak. Initially post discharge he was doing better, but in the past 2 months, weak and short of breath. He also nausea 10 o'clock in the morning for 2 months now. Epistaxis with full warfarin.  Takes half the dose instead. He does have claudication when he walks. Patient has not had blood tests in 2 months and is taking warfarin and other medications that require blood tests.

## 2020-06-09 ENCOUNTER — APPOINTMENT (OUTPATIENT)
Dept: ELECTROPHYSIOLOGY | Facility: CLINIC | Age: 65
End: 2020-06-09
Payer: COMMERCIAL

## 2020-06-09 ENCOUNTER — NON-APPOINTMENT (OUTPATIENT)
Age: 65
End: 2020-06-09

## 2020-06-09 ENCOUNTER — LABORATORY RESULT (OUTPATIENT)
Age: 65
End: 2020-06-09

## 2020-06-09 ENCOUNTER — APPOINTMENT (OUTPATIENT)
Dept: CV DIAGNOSITCS | Facility: HOSPITAL | Age: 65
End: 2020-06-09
Payer: COMMERCIAL

## 2020-06-09 ENCOUNTER — OUTPATIENT (OUTPATIENT)
Dept: OUTPATIENT SERVICES | Facility: HOSPITAL | Age: 65
LOS: 1 days | End: 2020-06-09

## 2020-06-09 VITALS
HEART RATE: 71 BPM | DIASTOLIC BLOOD PRESSURE: 92 MMHG | BODY MASS INDEX: 40.04 KG/M2 | SYSTOLIC BLOOD PRESSURE: 136 MMHG | TEMPERATURE: 96.7 F | OXYGEN SATURATION: 97 % | WEIGHT: 226 LBS | HEIGHT: 63 IN

## 2020-06-09 DIAGNOSIS — I51.3 INTRACARDIAC THROMBOSIS, NOT ELSEWHERE CLASSIFIED: ICD-10-CM

## 2020-06-09 DIAGNOSIS — Z86.79 PERSONAL HISTORY OF OTHER DISEASES OF THE CIRCULATORY SYSTEM: ICD-10-CM

## 2020-06-09 DIAGNOSIS — I50.22 CHRONIC SYSTOLIC (CONGESTIVE) HEART FAILURE: ICD-10-CM

## 2020-06-09 DIAGNOSIS — Z86.39 PERSONAL HISTORY OF OTHER ENDOCRINE, NUTRITIONAL AND METABOLIC DISEASE: ICD-10-CM

## 2020-06-09 DIAGNOSIS — I48.19 OTHER PERSISTENT ATRIAL FIBRILLATION: ICD-10-CM

## 2020-06-09 LAB
ALBUMIN SERPL ELPH-MCNC: 4.5 G/DL
ALP BLD-CCNC: 78 U/L
ALT SERPL-CCNC: 16 U/L
ANION GAP SERPL CALC-SCNC: 15 MMOL/L
AST SERPL-CCNC: 24 U/L
BASOPHILS # BLD AUTO: 0.06 K/UL
BASOPHILS NFR BLD AUTO: 0.9 %
BILIRUB SERPL-MCNC: 0.5 MG/DL
BUN SERPL-MCNC: 24 MG/DL
CALCIUM SERPL-MCNC: 9.5 MG/DL
CHLORIDE SERPL-SCNC: 104 MMOL/L
CO2 SERPL-SCNC: 23 MMOL/L
CREAT SERPL-MCNC: 1.19 MG/DL
DIGOXIN SERPL-MCNC: <0.3 NG/ML
EOSINOPHIL # BLD AUTO: 0.2 K/UL
EOSINOPHIL NFR BLD AUTO: 2.9 %
GLUCOSE SERPL-MCNC: 73 MG/DL
HCT VFR BLD CALC: 42.8 %
HGB BLD-MCNC: 13.8 G/DL
IMM GRANULOCYTES NFR BLD AUTO: 0.3 %
LYMPHOCYTES # BLD AUTO: 0.92 K/UL
LYMPHOCYTES NFR BLD AUTO: 13.2 %
MAGNESIUM SERPL-MCNC: 2 MG/DL
MAN DIFF?: NORMAL
MCHC RBC-ENTMCNC: 29.1 PG
MCHC RBC-ENTMCNC: 32.2 GM/DL
MCV RBC AUTO: 90.1 FL
MONOCYTES # BLD AUTO: 0.52 K/UL
MONOCYTES NFR BLD AUTO: 7.4 %
NEUTROPHILS # BLD AUTO: 5.26 K/UL
NEUTROPHILS NFR BLD AUTO: 75.3 %
NT-PROBNP SERPL-MCNC: 1388 PG/ML
PLATELET # BLD AUTO: 191 K/UL
POTASSIUM SERPL-SCNC: 4.1 MMOL/L
PROT SERPL-MCNC: 7.6 G/DL
RBC # BLD: 4.75 M/UL
RBC # FLD: 14.3 %
SODIUM SERPL-SCNC: 142 MMOL/L
T3RU NFR SERPL: 1 TBI
T4 SERPL-MCNC: 6.3 UG/DL
TSH SERPL-ACNC: 0.62 UIU/ML
WBC # FLD AUTO: 6.98 K/UL

## 2020-06-09 PROCEDURE — 93000 ELECTROCARDIOGRAM COMPLETE: CPT

## 2020-06-09 PROCEDURE — 99214 OFFICE O/P EST MOD 30 MIN: CPT

## 2020-06-09 PROCEDURE — 93306 TTE W/DOPPLER COMPLETE: CPT | Mod: 26

## 2020-06-09 RX ORDER — PANTOPRAZOLE 40 MG/1
40 TABLET, DELAYED RELEASE ORAL
Refills: 0 | Status: DISCONTINUED | COMMUNITY
End: 2020-06-09

## 2020-06-09 RX ORDER — LOSARTAN POTASSIUM 100 MG/1
100 TABLET, FILM COATED ORAL DAILY
Qty: 30 | Refills: 5 | Status: DISCONTINUED | COMMUNITY
Start: 2020-05-26 | End: 2020-06-09

## 2020-06-09 RX ORDER — GLIMEPIRIDE 1 MG/1
1 TABLET ORAL
Qty: 90 | Refills: 0 | Status: ACTIVE | COMMUNITY
Start: 2020-05-21

## 2020-06-09 RX ORDER — WARFARIN 3 MG/1
3 TABLET ORAL
Qty: 30 | Refills: 0 | Status: ACTIVE | COMMUNITY
Start: 2020-05-24

## 2020-06-09 RX ORDER — WARFARIN 5 MG/1
5 TABLET ORAL
Qty: 30 | Refills: 0 | Status: ACTIVE | COMMUNITY
Start: 2020-01-02

## 2020-06-09 NOTE — DISCUSSION/SUMMARY
[FreeTextEntry1] : Primo No is a 64y/o man with Hx of prior smoker (20 pack per year), CAD (100% stenosis of pRCA), chronic systolic CHF (LVEF 35% on 2017 cath) on OMT, HLD, DMII, HTN, mod-severe MR, LV aneurysm and thrombus (dating back to 2017) and newly found afib who presents today for routine f/u. \par \par Impression:\par \par 1. Chronic systolic CHF: Known chronic CHF, dating back to 2017 with last hospitalization in 12/2019. On OMT. Repeat ECHO performed today. If LVEF remains < 35%, recommend undergoing placement of ICD for primary prevention of SCD. Risks, benefits, and alternatives discussed. Given recent weight gain and abdominal distention noted, would benefit from improved diuretic therapy. Will draw blood work including pro BNP. Discussed Entresto with Dr. Teto Gonzalez. \par \par 2. Persistent atrial fibrillation: EKG today shows atrial fibrillation. On Coumadin. Will check INR today. Well rate controlled. \par \par 3. HTN: resume oral antihypertensives as prescribed. Encouraged heart healthy diet, sodium restriction, and weight loss. Continue regular f/u with Cardiologist for further HTN management.\par \par 4. HLD: resume statin therapy as prescribed and regular f/u with Cardiologist for routine lipid monitoring and management.\par

## 2020-06-09 NOTE — PHYSICAL EXAM
[General Appearance - Well Developed] : well developed [Normal Appearance] : normal appearance [General Appearance - Well Nourished] : well nourished [No Deformities] : no deformities [Well Groomed] : well groomed [Normal Conjunctiva] : the conjunctiva exhibited no abnormalities [General Appearance - In No Acute Distress] : no acute distress [Eyelids - No Xanthelasma] : the eyelids demonstrated no xanthelasmas [Normal Oral Mucosa] : normal oral mucosa [No Oral Cyanosis] : no oral cyanosis [No Oral Pallor] : no oral pallor [Normal Jugular Venous A Waves Present] : normal jugular venous A waves present [Normal Jugular Venous V Waves Present] : normal jugular venous V waves present [No Jugular Venous Márquez A Waves] : no jugular venous márquez A waves [Exaggerated Use Of Accessory Muscles For Inspiration] : no accessory muscle use [Respiration, Rhythm And Depth] : normal respiratory rhythm and effort [Auscultation Breath Sounds / Voice Sounds] : lungs were clear to auscultation bilaterally [Heart Sounds] : normal S1 and S2 [Murmurs] : no murmurs present [Abdomen Soft] : soft [Abdomen Tenderness] : non-tender [Abdomen Mass (___ Cm)] : no abdominal mass palpated [Abnormal Walk] : normal gait [Gait - Sufficient For Exercise Testing] : the gait was sufficient for exercise testing [Nail Clubbing] : no clubbing of the fingernails [Cyanosis, Localized] : no localized cyanosis [Petechial Hemorrhages (___cm)] : no petechial hemorrhages [] : no rash [Skin Color & Pigmentation] : normal skin color and pigmentation [Skin Lesions] : no skin lesions [No Venous Stasis] : no venous stasis [No Skin Ulcers] : no skin ulcer [Oriented To Time, Place, And Person] : oriented to person, place, and time [No Xanthoma] : no  xanthoma was observed [Affect] : the affect was normal [Mood] : the mood was normal [No Anxiety] : not feeling anxious [FreeTextEntry1] : irregular rate/rhythm

## 2020-06-09 NOTE — HISTORY OF PRESENT ILLNESS
[FreeTextEntry1] : Teto Gonzalez MD\frederick Paredes MD (PCP)\par \frederick Primo No is a 64y/o man with Hx of prior smoker (20 pack per year), CAD (100% stenosis of pRCA), chronic systolic CHF (LVEF 35% on 2017 cath) on OMT, HLD, DMII, HTN, mod-severe MR, LV aneurysm and thrombus (dating back to 2017) and newly found afib who presents today for routine f/u. Was hospitalized back in 12/2019 for acute on chronic CHF exacerbation which was c/b by newly diagnosed Afib, rate controlled, occasional PVCs and brief 4 NSVT. Was on heparin gtt at the time and bridged to Coumadin. Has since been maintained on Coumadin, only taking half dose secondary to epistaxis with full dosing. Bleeding has stopped since taking smaller doses. Unsure of INRs, no recent checks. Will check today. Has some exertional fatigue but feels mostly limited secondary to b/l knee pain. Has some weakness. Denies chest pain, palpitations, SOB, syncope or near syncope. Recently gained about 8 lbs.

## 2020-06-10 LAB
INR PPP: 1.96 RATIO
PT BLD: 23 SEC

## 2020-06-22 ENCOUNTER — RX CHANGE (OUTPATIENT)
Age: 65
End: 2020-06-22

## 2020-06-23 ENCOUNTER — RX CHANGE (OUTPATIENT)
Age: 65
End: 2020-06-23

## 2020-07-14 ENCOUNTER — RX CHANGE (OUTPATIENT)
Age: 65
End: 2020-07-14

## 2020-07-14 RX ORDER — DIGOXIN 125 UG/1
125 TABLET ORAL
Qty: 36 | Refills: 1 | Status: ACTIVE | COMMUNITY
Start: 2020-07-14 | End: 1900-01-01

## 2020-07-21 ENCOUNTER — APPOINTMENT (OUTPATIENT)
Dept: CARDIOLOGY | Facility: CLINIC | Age: 65
End: 2020-07-21

## 2020-08-17 ENCOUNTER — RX RENEWAL (OUTPATIENT)
Age: 65
End: 2020-08-17

## 2020-08-17 RX ORDER — SACUBITRIL AND VALSARTAN 24; 26 MG/1; MG/1
24-26 TABLET, FILM COATED ORAL TWICE DAILY
Qty: 60 | Refills: 1 | Status: ACTIVE | COMMUNITY
Start: 2020-06-09 | End: 1900-01-01

## 2020-08-18 RX ORDER — DIGOXIN 125 UG/1
125 TABLET ORAL
Qty: 14 | Refills: 2 | Status: ACTIVE | COMMUNITY
Start: 2020-05-26 | End: 1900-01-01

## 2021-07-05 ENCOUNTER — EMERGENCY (EMERGENCY)
Facility: HOSPITAL | Age: 66
LOS: 1 days | Discharge: HOME | End: 2021-07-05
Attending: STUDENT IN AN ORGANIZED HEALTH CARE EDUCATION/TRAINING PROGRAM | Admitting: STUDENT IN AN ORGANIZED HEALTH CARE EDUCATION/TRAINING PROGRAM
Payer: COMMERCIAL

## 2021-07-05 VITALS
DIASTOLIC BLOOD PRESSURE: 77 MMHG | HEART RATE: 85 BPM | SYSTOLIC BLOOD PRESSURE: 158 MMHG | HEIGHT: 67 IN | RESPIRATION RATE: 18 BRPM | OXYGEN SATURATION: 98 % | TEMPERATURE: 98 F

## 2021-07-05 VITALS
RESPIRATION RATE: 18 BRPM | HEART RATE: 81 BPM | TEMPERATURE: 98 F | DIASTOLIC BLOOD PRESSURE: 88 MMHG | SYSTOLIC BLOOD PRESSURE: 147 MMHG | OXYGEN SATURATION: 95 %

## 2021-07-05 LAB
ALBUMIN SERPL ELPH-MCNC: 4.1 G/DL — SIGNIFICANT CHANGE UP (ref 3.3–5)
ALP SERPL-CCNC: 126 U/L — HIGH (ref 40–120)
ALT FLD-CCNC: 20 U/L — SIGNIFICANT CHANGE UP (ref 4–41)
ANION GAP SERPL CALC-SCNC: 13 MMOL/L — SIGNIFICANT CHANGE UP (ref 7–14)
AST SERPL-CCNC: 24 U/L — SIGNIFICANT CHANGE UP (ref 4–40)
BASOPHILS # BLD AUTO: 0.06 K/UL — SIGNIFICANT CHANGE UP (ref 0–0.2)
BASOPHILS NFR BLD AUTO: 0.7 % — SIGNIFICANT CHANGE UP (ref 0–2)
BILIRUB SERPL-MCNC: 1.2 MG/DL — SIGNIFICANT CHANGE UP (ref 0.2–1.2)
BUN SERPL-MCNC: 25 MG/DL — HIGH (ref 7–23)
CALCIUM SERPL-MCNC: 9.8 MG/DL — SIGNIFICANT CHANGE UP (ref 8.4–10.5)
CHLORIDE SERPL-SCNC: 98 MMOL/L — SIGNIFICANT CHANGE UP (ref 98–107)
CO2 SERPL-SCNC: 29 MMOL/L — SIGNIFICANT CHANGE UP (ref 22–31)
CREAT SERPL-MCNC: 1.23 MG/DL — SIGNIFICANT CHANGE UP (ref 0.5–1.3)
EOSINOPHIL # BLD AUTO: 0.19 K/UL — SIGNIFICANT CHANGE UP (ref 0–0.5)
EOSINOPHIL NFR BLD AUTO: 2.4 % — SIGNIFICANT CHANGE UP (ref 0–6)
GLUCOSE SERPL-MCNC: 217 MG/DL — HIGH (ref 70–99)
HCT VFR BLD CALC: 40 % — SIGNIFICANT CHANGE UP (ref 39–50)
HGB BLD-MCNC: 12.9 G/DL — LOW (ref 13–17)
IANC: 6.36 K/UL — SIGNIFICANT CHANGE UP (ref 1.5–8.5)
IMM GRANULOCYTES NFR BLD AUTO: 0.2 % — SIGNIFICANT CHANGE UP (ref 0–1.5)
LYMPHOCYTES # BLD AUTO: 0.75 K/UL — LOW (ref 1–3.3)
LYMPHOCYTES # BLD AUTO: 9.3 % — LOW (ref 13–44)
MCHC RBC-ENTMCNC: 28.9 PG — SIGNIFICANT CHANGE UP (ref 27–34)
MCHC RBC-ENTMCNC: 32.3 GM/DL — SIGNIFICANT CHANGE UP (ref 32–36)
MCV RBC AUTO: 89.5 FL — SIGNIFICANT CHANGE UP (ref 80–100)
MONOCYTES # BLD AUTO: 0.69 K/UL — SIGNIFICANT CHANGE UP (ref 0–0.9)
MONOCYTES NFR BLD AUTO: 8.6 % — SIGNIFICANT CHANGE UP (ref 2–14)
NEUTROPHILS # BLD AUTO: 6.36 K/UL — SIGNIFICANT CHANGE UP (ref 1.8–7.4)
NEUTROPHILS NFR BLD AUTO: 78.8 % — HIGH (ref 43–77)
NRBC # BLD: 0 /100 WBCS — SIGNIFICANT CHANGE UP
NRBC # FLD: 0 K/UL — SIGNIFICANT CHANGE UP
PLATELET # BLD AUTO: 210 K/UL — SIGNIFICANT CHANGE UP (ref 150–400)
POTASSIUM SERPL-MCNC: 4.5 MMOL/L — SIGNIFICANT CHANGE UP (ref 3.5–5.3)
POTASSIUM SERPL-SCNC: 4.5 MMOL/L — SIGNIFICANT CHANGE UP (ref 3.5–5.3)
PROT SERPL-MCNC: 7.8 G/DL — SIGNIFICANT CHANGE UP (ref 6–8.3)
RBC # BLD: 4.47 M/UL — SIGNIFICANT CHANGE UP (ref 4.2–5.8)
RBC # FLD: 14 % — SIGNIFICANT CHANGE UP (ref 10.3–14.5)
SODIUM SERPL-SCNC: 140 MMOL/L — SIGNIFICANT CHANGE UP (ref 135–145)
TROPONIN T, HIGH SENSITIVITY RESULT: 23 NG/L — SIGNIFICANT CHANGE UP
TROPONIN T, HIGH SENSITIVITY RESULT: 24 NG/L — SIGNIFICANT CHANGE UP
WBC # BLD: 8.07 K/UL — SIGNIFICANT CHANGE UP (ref 3.8–10.5)
WBC # FLD AUTO: 8.07 K/UL — SIGNIFICANT CHANGE UP (ref 3.8–10.5)

## 2021-07-05 PROCEDURE — 71045 X-RAY EXAM CHEST 1 VIEW: CPT | Mod: 26

## 2021-07-05 PROCEDURE — 99284 EMERGENCY DEPT VISIT MOD MDM: CPT

## 2021-07-05 RX ORDER — ALBUTEROL 90 UG/1
3 AEROSOL, METERED ORAL ONCE
Refills: 0 | Status: COMPLETED | OUTPATIENT
Start: 2021-07-05 | End: 2021-07-05

## 2021-07-05 RX ORDER — ALBUTEROL 90 UG/1
2.5 AEROSOL, METERED ORAL
Refills: 0 | Status: DISCONTINUED | OUTPATIENT
Start: 2021-07-05 | End: 2021-07-05

## 2021-07-05 RX ORDER — METOCLOPRAMIDE HCL 10 MG
10 TABLET ORAL ONCE
Refills: 0 | Status: COMPLETED | OUTPATIENT
Start: 2021-07-05 | End: 2021-07-05

## 2021-07-05 RX ADMIN — ALBUTEROL 3 PUFF(S): 90 AEROSOL, METERED ORAL at 14:54

## 2021-07-05 RX ADMIN — Medication 10 MILLIGRAM(S): at 14:13

## 2021-07-05 NOTE — ED ADULT NURSE NOTE - NS ED NOTE ABUSE SUSPICION NEGLECT YN
Render Post-Care Instructions In Note?: yes Render In Bullet Format When Appropriate: No Number Of Freeze-Thaw Cycles: 2 freeze-thaw cycles Total Number Of Aks Treated: 3 Consent: The patient's consent was obtained including but not limited to risks of crusting, scabbing, blistering, scarring, darker or lighter pigmentary change, recurrence, incomplete removal and infection. Post-Care Instructions: I reviewed with the patient in detail post-care instructions. Patient is to wear sunprotection, and avoid picking at any of the treated lesions. Pt may apply Vaseline to crusted or scabbing areas. Detail Level: Zone No

## 2021-07-05 NOTE — ED PROVIDER NOTE - CLINICAL SUMMARY MEDICAL DECISION MAKING FREE TEXT BOX
66M p/w sx c/w URI, will screen for cardiac etiology given comorbidities, will r/o pna w/ cxr; if results wnl pt likely dc w/ close outpt f/u

## 2021-07-05 NOTE — ED PROVIDER NOTE - OBJECTIVE STATEMENT
66M w/ pmh HTN, CHF - p/w productive cough, chest pressure, sore throat, sob x 3 days. prior smoker. no fever, vomiting, dizziness, headache. is covid vaccinated. ambulatory sat in room 91-97% on RA.

## 2021-07-05 NOTE — ED PROVIDER NOTE - PHYSICAL EXAMINATION
*GEN:   obese, in no acute distress, AOx3  *EYES:   extra-occular movements intact  *HEENT:   airway patent  *CV:   regular rate and rhythm  *RESP:   coarse breath sounds bilat, non-labored  *ABD:   soft, non-tender  *EXTREM:   no MSK tenderness, full ROM throughout, no leg swelling  *SKIN:   dry, intact  *NEURO:   AOx3, no focal weakness or loss of sensation

## 2021-07-05 NOTE — ED PROVIDER NOTE - PATIENT PORTAL LINK FT
You can access the FollowMyHealth Patient Portal offered by [facility] by registering at the following website: [email or web address]. By joining [employer]’s FollowMyHealth portal, you will also be able to view your health information using other applications (apps) compatible with our system. You can access the FollowMyHealth Patient Portal offered by [facility] by registering at the following website: [email or web address]. By joining [employer]’s FollowMyHealth portal, you will also be able to view your health information using other applications (apps) compatible with our system.

## 2021-07-05 NOTE — ED PROVIDER NOTE - NSFOLLOWUPINSTRUCTIONS_ED_ALL_ED_FT
Follow up with your primary care doctor within the next 3-5 days.    Return to the ER immediately if your symptoms worsen, or experience the following issues: high fevers, severe vomiting, passing out.

## 2021-07-05 NOTE — ED ADULT NURSE NOTE - OBJECTIVE STATEMENT
Pt presenting to intake AxOx4 ambulatory at baseline with c/o cough, SOB. PMH HTN, DM, CHF. On arrival to ED pt's breathing is even and unlabored. Palor/diaphoresis not noted. Pt denies CP, fever, N/V/D, abdominal pain. IV established with 20g in RAC. Labs drawn and sent. MD at bedside, will continue to monitor.

## 2021-07-05 NOTE — ED PROVIDER NOTE - PROGRESS NOTE DETAILS
Hudson Oliver MD: pt has new headache, continues to feel short of breath. ordered meds, add on probnp

## 2021-07-05 NOTE — ED PROVIDER NOTE - NSFOLLOWUPCLINICS_GEN_ALL_ED_FT
[facility] General Internal Medicine  General Internal Medicine  [address] [place] [address]  Phone: [phone]  Fax:

## 2021-07-08 ENCOUNTER — INPATIENT (INPATIENT)
Facility: HOSPITAL | Age: 66
LOS: 6 days | Discharge: HOME | DRG: 189 | End: 2021-07-15
Attending: HOSPITALIST | Admitting: HOSPITALIST
Payer: COMMERCIAL

## 2021-07-08 VITALS
WEIGHT: 98.11 LBS | RESPIRATION RATE: 18 BRPM | TEMPERATURE: 98 F | HEART RATE: 90 BPM | SYSTOLIC BLOOD PRESSURE: 151 MMHG | OXYGEN SATURATION: 98 % | HEIGHT: 67 IN | DIASTOLIC BLOOD PRESSURE: 83 MMHG

## 2021-07-08 DIAGNOSIS — R06.2 WHEEZING: ICD-10-CM

## 2021-07-08 LAB
ALBUMIN SERPL ELPH-MCNC: 3 G/DL — LOW (ref 3.5–5)
ALP SERPL-CCNC: 127 U/L — HIGH (ref 40–120)
ALT FLD-CCNC: 28 U/L DA — SIGNIFICANT CHANGE UP (ref 10–60)
ANION GAP SERPL CALC-SCNC: 11 MMOL/L — SIGNIFICANT CHANGE UP (ref 5–17)
AST SERPL-CCNC: 39 U/L — SIGNIFICANT CHANGE UP (ref 10–40)
BASOPHILS # BLD AUTO: 0.06 K/UL — SIGNIFICANT CHANGE UP (ref 0–0.2)
BASOPHILS NFR BLD AUTO: 0.8 % — SIGNIFICANT CHANGE UP (ref 0–2)
BILIRUB SERPL-MCNC: 1.4 MG/DL — HIGH (ref 0.2–1.2)
BUN SERPL-MCNC: 33 MG/DL — HIGH (ref 7–18)
CALCIUM SERPL-MCNC: 9.7 MG/DL — SIGNIFICANT CHANGE UP (ref 8.4–10.5)
CHLORIDE SERPL-SCNC: 95 MMOL/L — LOW (ref 96–108)
CO2 SERPL-SCNC: 33 MMOL/L — HIGH (ref 22–31)
CREAT SERPL-MCNC: 1.8 MG/DL — HIGH (ref 0.5–1.3)
EOSINOPHIL # BLD AUTO: 0.61 K/UL — HIGH (ref 0–0.5)
EOSINOPHIL NFR BLD AUTO: 8.4 % — HIGH (ref 0–6)
GLUCOSE SERPL-MCNC: 155 MG/DL — HIGH (ref 70–99)
HCT VFR BLD CALC: 43.2 % — SIGNIFICANT CHANGE UP (ref 39–50)
HGB BLD-MCNC: 13.9 G/DL — SIGNIFICANT CHANGE UP (ref 13–17)
IMM GRANULOCYTES NFR BLD AUTO: 0.5 % — SIGNIFICANT CHANGE UP (ref 0–1.5)
LYMPHOCYTES # BLD AUTO: 0.74 K/UL — LOW (ref 1–3.3)
LYMPHOCYTES # BLD AUTO: 10.2 % — LOW (ref 13–44)
MCHC RBC-ENTMCNC: 29.5 PG — SIGNIFICANT CHANGE UP (ref 27–34)
MCHC RBC-ENTMCNC: 32.2 GM/DL — SIGNIFICANT CHANGE UP (ref 32–36)
MCV RBC AUTO: 91.7 FL — SIGNIFICANT CHANGE UP (ref 80–100)
MONOCYTES # BLD AUTO: 1.15 K/UL — HIGH (ref 0–0.9)
MONOCYTES NFR BLD AUTO: 15.8 % — HIGH (ref 2–14)
NEUTROPHILS # BLD AUTO: 4.69 K/UL — SIGNIFICANT CHANGE UP (ref 1.8–7.4)
NEUTROPHILS NFR BLD AUTO: 64.3 % — SIGNIFICANT CHANGE UP (ref 43–77)
NRBC # BLD: 0 /100 WBCS — SIGNIFICANT CHANGE UP (ref 0–0)
NT-PROBNP SERPL-SCNC: 4422 PG/ML — HIGH (ref 0–125)
PLATELET # BLD AUTO: 280 K/UL — SIGNIFICANT CHANGE UP (ref 150–400)
POTASSIUM SERPL-MCNC: 3.1 MMOL/L — LOW (ref 3.5–5.3)
POTASSIUM SERPL-SCNC: 3.1 MMOL/L — LOW (ref 3.5–5.3)
PROT SERPL-MCNC: 8.4 G/DL — HIGH (ref 6–8.3)
RBC # BLD: 4.71 M/UL — SIGNIFICANT CHANGE UP (ref 4.2–5.8)
RBC # FLD: 14.2 % — SIGNIFICANT CHANGE UP (ref 10.3–14.5)
SARS-COV-2 RNA SPEC QL NAA+PROBE: SIGNIFICANT CHANGE UP
SODIUM SERPL-SCNC: 139 MMOL/L — SIGNIFICANT CHANGE UP (ref 135–145)
TROPONIN I SERPL-MCNC: 0.06 NG/ML — HIGH (ref 0–0.04)
WBC # BLD: 7.29 K/UL — SIGNIFICANT CHANGE UP (ref 3.8–10.5)
WBC # FLD AUTO: 7.29 K/UL — SIGNIFICANT CHANGE UP (ref 3.8–10.5)

## 2021-07-08 PROCEDURE — 71045 X-RAY EXAM CHEST 1 VIEW: CPT | Mod: 26

## 2021-07-08 PROCEDURE — 99285 EMERGENCY DEPT VISIT HI MDM: CPT

## 2021-07-08 PROCEDURE — 99223 1ST HOSP IP/OBS HIGH 75: CPT

## 2021-07-08 PROCEDURE — 93010 ELECTROCARDIOGRAM REPORT: CPT

## 2021-07-08 RX ORDER — FUROSEMIDE 40 MG
40 TABLET ORAL ONCE
Refills: 0 | Status: DISCONTINUED | OUTPATIENT
Start: 2021-07-08 | End: 2021-07-08

## 2021-07-08 RX ORDER — AZITHROMYCIN 500 MG/1
500 TABLET, FILM COATED ORAL ONCE
Refills: 0 | Status: COMPLETED | OUTPATIENT
Start: 2021-07-08 | End: 2021-07-08

## 2021-07-08 RX ORDER — IPRATROPIUM/ALBUTEROL SULFATE 18-103MCG
3 AEROSOL WITH ADAPTER (GRAM) INHALATION ONCE
Refills: 0 | Status: COMPLETED | OUTPATIENT
Start: 2021-07-08 | End: 2021-07-08

## 2021-07-08 RX ORDER — POTASSIUM CHLORIDE 20 MEQ
40 PACKET (EA) ORAL EVERY 4 HOURS
Refills: 0 | Status: COMPLETED | OUTPATIENT
Start: 2021-07-08 | End: 2021-07-09

## 2021-07-08 RX ORDER — ONDANSETRON 8 MG/1
4 TABLET, FILM COATED ORAL ONCE
Refills: 0 | Status: COMPLETED | OUTPATIENT
Start: 2021-07-08 | End: 2021-07-08

## 2021-07-08 RX ORDER — CEFTRIAXONE 500 MG/1
1000 INJECTION, POWDER, FOR SOLUTION INTRAMUSCULAR; INTRAVENOUS ONCE
Refills: 0 | Status: COMPLETED | OUTPATIENT
Start: 2021-07-08 | End: 2021-07-08

## 2021-07-08 RX ADMIN — Medication 3 MILLILITER(S): at 18:43

## 2021-07-08 RX ADMIN — ONDANSETRON 4 MILLIGRAM(S): 8 TABLET, FILM COATED ORAL at 19:01

## 2021-07-08 RX ADMIN — AZITHROMYCIN 255 MILLIGRAM(S): 500 TABLET, FILM COATED ORAL at 18:43

## 2021-07-08 RX ADMIN — Medication 125 MILLIGRAM(S): at 18:43

## 2021-07-08 RX ADMIN — Medication 40 MILLIEQUIVALENT(S): at 22:05

## 2021-07-08 RX ADMIN — CEFTRIAXONE 100 MILLIGRAM(S): 500 INJECTION, POWDER, FOR SOLUTION INTRAMUSCULAR; INTRAVENOUS at 22:03

## 2021-07-08 NOTE — H&P ADULT - ATTENDING COMMENTS
Pt seen and examined.  Case discussed with MAR.    66 year old man with PMH of multi-vessel CAD, severe MV regurgitation, CHF( ischemic cardiomyopathy), DM2, HTN, ? paroxysmal A-fibrillation on OAC who presented to the ED on account of progressive SOB X 1 week, associated productive cough.     Vital Signs Last 24 Hrs  T(C): 37 (08 Jul 2021 20:04), Max: 37 (08 Jul 2021 20:04)  T(F): 98.6 (08 Jul 2021 20:04), Max: 98.6 (08 Jul 2021 20:04)  HR: 84 (08 Jul 2021 20:04) (84 - 90)  BP: 135/66 (08 Jul 2021 20:04) (135/66 - 151/83)  RR: 18 (08 Jul 2021 20:04) (18 - 18)  SpO2: 84% (08 Jul 2021 20:04) (84% - 98%)    Labs                        13.9   7.29  )-----------( 280      ( 08 Jul 2021 18:45 )             43.2     07-08    139  |  95<L>  |  33<H>  ----------------------------<  155<H>  3.1<L>   |  33<H>  |  1.80<H>    Ca    9.7      08 Jul 2021 18:45    TPro  8.4<H>  /  Alb  3.0<L>  /  TBili  1.4<H>  /  DBili  x   /  AST  39  /  ALT  28  /  AlkPhos  127<H>  07-08 Pt seen and examined.  Case discussed with MAR.    66 year old man with PMH of multi-vessel CAD, severe MV regurgitation, CHF( ischemic cardiomyopathy), DM2, HTN, ? paroxysmal A-fibrillation on OAC who presented to the ED on account of progressive SOB X 1 week, associated productive cough.     Vital Signs Last 24 Hrs  T(C): 37 (08 Jul 2021 20:04), Max: 37 (08 Jul 2021 20:04)  T(F): 98.6 (08 Jul 2021 20:04), Max: 98.6 (08 Jul 2021 20:04)  HR: 84 (08 Jul 2021 20:04) (84 - 90)  BP: 135/66 (08 Jul 2021 20:04) (135/66 - 151/83)  RR: 18 (08 Jul 2021 20:04) (18 - 18)  SpO2: 84% (08 Jul 2021 20:04) (84% - 98%)    Labs                        13.9   7.29  )-----------( 280      ( 08 Jul 2021 18:45 )             43.2     07-08    139  |  95<L>  |  33<H>  ----------------------------<  155<H>  3.1<L>   |  33<H>  |  1.80<H>    Ca    9.7      08 Jul 2021 18:45    TPro  8.4<H>  /  Alb  3.0<L>  /  TBili  1.4<H>  /  DBili  x   /  AST  39  /  ALT  28  /  AlkPhos  127<H>  07-08    CARDIAC MARKERS ( 08 Jul 2021 18:45 )  0.062 ng/mL / x     / x     / x     / x        PRo BNP - 4422  CXR (7/8/21)   Independent assessment compared to CXR 7/5/21)  Film is AP compared to the PA cxr from few days ago   Increased homogenous interstitial infiltrates in the right lung compared to baseline suspicious for pulmonary vascular congestion. The diaphragm appear well delineated.    Impression  66 year old man with PMH as detailed above presenting with 1 week of progressive respiratory distress concerning for CHF exacerbation +/- an undiagnosed obstructive airway disease.   Underlying hx of multivessel CAD ( noted on remote cardiac cath ) and DM with suspicion for ischemic cardiomyopathy. Will repeat ECHO on this visit and allow cardiology evaluation for possible AICD evaluation and plans for management of multivessel CAD.    A/P  - Acute respiratory failure with hypoxia  - Suspected CHF  r/o obstructive airway disease   - EUGENIO likely pre-renal  - Dehydration with contraction alkalosis  - Hypokalemia  - Elevated troponin Pt seen and examined.  Case discussed with MAR.    66 year old man with PMH of multi-vessel CAD, severe MV regurgitation, CHF( ischemic cardiomyopathy), DM2, HTN, ? paroxysmal A-fibrillation on OAC who presented to the ED on account of progressive SOB X 1 week, associated productive cough.     Vital Signs Last 24 Hrs  T(C): 37 (08 Jul 2021 20:04), Max: 37 (08 Jul 2021 20:04)  T(F): 98.6 (08 Jul 2021 20:04), Max: 98.6 (08 Jul 2021 20:04)  HR: 84 (08 Jul 2021 20:04) (84 - 90)  BP: 135/66 (08 Jul 2021 20:04) (135/66 - 151/83)  RR: 18 (08 Jul 2021 20:04) (18 - 18)  SpO2: 84% (08 Jul 2021 20:04) (84% - 98%)    Labs                        13.9   7.29  )-----------( 280      ( 08 Jul 2021 18:45 )             43.2     07-08    139  |  95<L>  |  33<H>  ----------------------------<  155<H>  3.1<L>   |  33<H>  |  1.80<H>    Ca    9.7      08 Jul 2021 18:45    TPro  8.4<H>  /  Alb  3.0<L>  /  TBili  1.4<H>  /  DBili  x   /  AST  39  /  ALT  28  /  AlkPhos  127<H>  07-08    CARDIAC MARKERS ( 08 Jul 2021 18:45 )  0.062 ng/mL / x     / x     / x     / x        Pro BNP - 4422  CXR (7/8/21)   Independent assessment compared to CXR 7/5/21)  Film is AP compared to the PA cxr from few days ago   Increased homogenous interstitial infiltrates in the right lung compared to baseline suspicious for pulmonary vascular congestion. The diaphragm appear well delineated.    Impression  66 year old man with PMH as detailed above presenting with 1 week of progressive respiratory distress concerning for CHF exacerbation +/- and undiagnosed obstructive airway disease.   Underlying hx of multivessel CAD ( noted on remote cardiac cath ) and DM with suspicion for ischemic cardiomyopathy. Will repeat ECHO on this visit and allow cardiology evaluation for possible AICD evaluation and plans for management of multivessel CAD.    A/P  - Acute respiratory failure with hypoxia  - Suspected CHF  r/o obstructive airway disease   - EUGENIO likely pre-renal  - Hypokalemia  - Elevated troponin - likely demand ischemia vs ?? NSTEMI    - Plan   Admit to Medicine   Continue supplemental O2  Supportive care with bronchodilators via MDI or nebs  IV steroids -solumedrol 40mg q 12hrly and taper with improvement  Diuresis with lasix - 20mg IV daily; avoid overdiuresis  Correct low K+; check Mg  Serial troponin and EKG  ECHO  2D  Cardiology consult

## 2021-07-08 NOTE — H&P ADULT - PROBLEM SELECTOR PLAN 5
- h/o Afib and cardiac thrombus (?), on warfarin 4mg at home; not on rate-control meds  - EKG afib, RVR  - c/w home meds

## 2021-07-08 NOTE — H&P ADULT - PROBLEM SELECTOR PLAN 4
- noted to have Cr 1.80; last Cr 1.23 (Jul 2021)  - likely 2/2 volume depletion  - monitor Cr  - f/u FeNa

## 2021-07-08 NOTE — H&P ADULT - MUSCULOSKELETAL
detailed exam normal/ROM intact/no joint swelling/no joint erythema/no joint warmth/no calf tenderness/normal strength

## 2021-07-08 NOTE — H&P ADULT - PROBLEM SELECTOR PLAN 7
- h/o HLD, on Rosuvastatin 40mg qHs at home; ASA recently d/c'd  - c/w atorvastatin 80mg qHs  - f/u lipid panel

## 2021-07-08 NOTE — ED PROVIDER NOTE - CLINICAL SUMMARY MEDICAL DECISION MAKING FREE TEXT BOX
66M with PMH including CHF, afib on warfarin, prior 30 year smoker p/w cough productive of yellow phlegm and SOB x 1 week. No fever, chest pain, LE swelling. Pt well appearing, no signs of fluid overload, not in respiratory distress, but with diffuse wheezing/rhonchi c/w PNA vs. COPD. Labs, XR, and likely admit.

## 2021-07-08 NOTE — H&P ADULT - PROBLEM SELECTOR PLAN 1
- p/w SOB since last Friday  - Ddx: COPD exac, CHF exac or combo  - VS: O2 sat 84% prior admission  - PE: diffuse rhonchi noted b/l UL > LL  - WBC 7.29  - BNP 4422  - trop 0.062  - CXR infiltrates noted on right side (f/u official read)  - s/p ceft & azithro, steroid, albuterol  - c/w ceftriaxone, azithromycin, and steroid  - f/u trop 2, 3

## 2021-07-08 NOTE — ED PROVIDER NOTE - PROGRESS NOTE DETAILS
Pt feeling well. Stable on 2L 02. Likely COPD vs. PNA. Low suspicion for ACS given lack of chest pain and non-ischemic EKG.

## 2021-07-08 NOTE — ED PROVIDER NOTE - PHYSICAL EXAMINATION
Gen: AAOx3, non-toxic  Head: NCAT  HEENT: EOMI, oral mucosa moist, normal conjunctiva  Lung: diffuse rhonchi/wheezing, no respiratory distress   CV: RRR, no murmurs, rubs or gallops  Abd: soft, NTND, no guarding, no CVA tenderness  MSK: no visible deformities  Neuro: No focal sensory or motor deficits  Skin: Warm, well perfused, no rash, no LE edema or calf TTP   Psych: normal affect.     Ashu Payne DO

## 2021-07-08 NOTE — ED PROVIDER NOTE - OBJECTIVE STATEMENT
66M with PMH including CHF, afib on warfarin, prior 30 year smoker p/w cough productive of yellow phlegm and SOB x 1 week. Denies any fever, chest pain, LE swelling.

## 2021-07-08 NOTE — ED PROVIDER NOTE - NS ED ROS FT
ROS:  GENERAL: No fever, no chills  EYES: no change in vision  HEENT: no trouble swallowing, no trouble speaking  CARDIAC: no chest pain  PULMONARY: +cough/sob   GI: no abdominal pain, no nausea, no vomiting, no diarrhea, no constipation  : No dysuria, no frequency, no change in appearance, or odor of urine  SKIN: no rashes  NEURO: no headache, no weakness  MSK: No joint pain    Ashu Payne, DO

## 2021-07-08 NOTE — H&P ADULT - HISTORY OF PRESENT ILLNESS
Patient is a 66 year old male, w/ PMH w/ CHF, afib, HTN, HLD, and DM, sent by urgent care due to hypoxia. He reports difficult breathing since last Friday. He reports no inciting factor, including recent traveling. He endorses unable to lay flat on surface, diaphoresis, recent wt loss of 10lb in a month, and generalized weakness. He denies fever, chills, n/v, chest pain, abdominal pain, urinary or bowel movement changes.

## 2021-07-08 NOTE — H&P ADULT - ASSESSMENT
Patient is a 66 year old male, w/ PMH w/ CHF, afib, HTN, HLD, and DM, sent by urgent care due to hypoxia. Admitted for COPD exac

## 2021-07-09 DIAGNOSIS — Z29.9 ENCOUNTER FOR PROPHYLACTIC MEASURES, UNSPECIFIED: ICD-10-CM

## 2021-07-09 DIAGNOSIS — I10 ESSENTIAL (PRIMARY) HYPERTENSION: ICD-10-CM

## 2021-07-09 DIAGNOSIS — I48.91 UNSPECIFIED ATRIAL FIBRILLATION: ICD-10-CM

## 2021-07-09 DIAGNOSIS — E78.5 HYPERLIPIDEMIA, UNSPECIFIED: ICD-10-CM

## 2021-07-09 DIAGNOSIS — N17.9 ACUTE KIDNEY FAILURE, UNSPECIFIED: ICD-10-CM

## 2021-07-09 DIAGNOSIS — R06.02 SHORTNESS OF BREATH: ICD-10-CM

## 2021-07-09 DIAGNOSIS — E11.9 TYPE 2 DIABETES MELLITUS WITHOUT COMPLICATIONS: ICD-10-CM

## 2021-07-09 DIAGNOSIS — J44.1 CHRONIC OBSTRUCTIVE PULMONARY DISEASE WITH (ACUTE) EXACERBATION: ICD-10-CM

## 2021-07-09 DIAGNOSIS — I50.9 HEART FAILURE, UNSPECIFIED: ICD-10-CM

## 2021-07-09 LAB
A1C WITH ESTIMATED AVERAGE GLUCOSE RESULT: 8.8 % — HIGH (ref 4–5.6)
ALBUMIN SERPL ELPH-MCNC: 3.1 G/DL — LOW (ref 3.5–5)
ALP SERPL-CCNC: 125 U/L — HIGH (ref 40–120)
ALT FLD-CCNC: 29 U/L DA — SIGNIFICANT CHANGE UP (ref 10–60)
ANION GAP SERPL CALC-SCNC: 7 MMOL/L — SIGNIFICANT CHANGE UP (ref 5–17)
APTT BLD: 47.1 SEC — HIGH (ref 27.5–35.5)
AST SERPL-CCNC: 32 U/L — SIGNIFICANT CHANGE UP (ref 10–40)
BASE EXCESS BLDCOV CALC-SCNC: 5.6 MMOL/L — HIGH (ref -9.3–0.3)
BASOPHILS # BLD AUTO: 0 K/UL — SIGNIFICANT CHANGE UP (ref 0–0.2)
BASOPHILS NFR BLD AUTO: 0 % — SIGNIFICANT CHANGE UP (ref 0–2)
BILIRUB SERPL-MCNC: 1 MG/DL — SIGNIFICANT CHANGE UP (ref 0.2–1.2)
BUN SERPL-MCNC: 37 MG/DL — HIGH (ref 7–18)
CALCIUM SERPL-MCNC: 9.8 MG/DL — SIGNIFICANT CHANGE UP (ref 8.4–10.5)
CHLORIDE SERPL-SCNC: 94 MMOL/L — LOW (ref 96–108)
CHOLEST SERPL-MCNC: 93 MG/DL — SIGNIFICANT CHANGE UP
CO2 SERPL-SCNC: 34 MMOL/L — HIGH (ref 22–31)
COVID-19 SPIKE DOMAIN AB INTERP: POSITIVE
COVID-19 SPIKE DOMAIN ANTIBODY RESULT: >250 U/ML — HIGH
CREAT ?TM UR-MCNC: 84 MG/DL — SIGNIFICANT CHANGE UP
CREAT SERPL-MCNC: 1.87 MG/DL — HIGH (ref 0.5–1.3)
D DIMER BLD IA.RAPID-MCNC: 415 NG/ML DDU — HIGH
EOSINOPHIL # BLD AUTO: 0 K/UL — SIGNIFICANT CHANGE UP (ref 0–0.5)
EOSINOPHIL NFR BLD AUTO: 0 % — SIGNIFICANT CHANGE UP (ref 0–6)
ESTIMATED AVERAGE GLUCOSE: 206 MG/DL — HIGH (ref 68–114)
FIO2 CORD, VENOUS: 21 — SIGNIFICANT CHANGE UP
GAS PNL BLDCOV: 7.26 — SIGNIFICANT CHANGE UP (ref 7.25–7.45)
GLUCOSE BLDC GLUCOMTR-MCNC: 410 MG/DL — HIGH (ref 70–99)
GLUCOSE BLDC GLUCOMTR-MCNC: 463 MG/DL — CRITICAL HIGH (ref 70–99)
GLUCOSE SERPL-MCNC: 350 MG/DL — HIGH (ref 70–99)
HCO3 BLDCOV-SCNC: 36 MMOL/L — SIGNIFICANT CHANGE UP
HCT VFR BLD CALC: 42.3 % — SIGNIFICANT CHANGE UP (ref 39–50)
HDLC SERPL-MCNC: 30 MG/DL — LOW
HGB BLD-MCNC: 13.3 G/DL — SIGNIFICANT CHANGE UP (ref 13–17)
INR BLD: 3.6 RATIO — HIGH (ref 0.88–1.16)
LIPID PNL WITH DIRECT LDL SERPL: 44 MG/DL — SIGNIFICANT CHANGE UP
LYMPHOCYTES # BLD AUTO: 0.34 K/UL — LOW (ref 1–3.3)
LYMPHOCYTES # BLD AUTO: 6 % — LOW (ref 13–44)
MAGNESIUM SERPL-MCNC: 2.4 MG/DL — SIGNIFICANT CHANGE UP (ref 1.6–2.6)
MANUAL SMEAR VERIFICATION: SIGNIFICANT CHANGE UP
MCHC RBC-ENTMCNC: 29.2 PG — SIGNIFICANT CHANGE UP (ref 27–34)
MCHC RBC-ENTMCNC: 31.4 GM/DL — LOW (ref 32–36)
MCV RBC AUTO: 92.8 FL — SIGNIFICANT CHANGE UP (ref 80–100)
MONOCYTES # BLD AUTO: 0.11 K/UL — SIGNIFICANT CHANGE UP (ref 0–0.9)
MONOCYTES NFR BLD AUTO: 2 % — SIGNIFICANT CHANGE UP (ref 2–14)
NEUTROPHILS # BLD AUTO: 5.24 K/UL — SIGNIFICANT CHANGE UP (ref 1.8–7.4)
NEUTROPHILS NFR BLD AUTO: 92 % — HIGH (ref 43–77)
NON HDL CHOLESTEROL: 63 MG/DL — SIGNIFICANT CHANGE UP
NRBC # BLD: 0 /100 — SIGNIFICANT CHANGE UP (ref 0–0)
OSMOLALITY UR: 721 MOS/KG — SIGNIFICANT CHANGE UP (ref 50–1200)
PCO2 BLDCOV: 79 MMHG — HIGH (ref 27–49)
PHOSPHATE SERPL-MCNC: 3.8 MG/DL — SIGNIFICANT CHANGE UP (ref 2.5–4.5)
PLAT MORPH BLD: NORMAL — SIGNIFICANT CHANGE UP
PLATELET # BLD AUTO: 266 K/UL — SIGNIFICANT CHANGE UP (ref 150–400)
PLATELET COUNT - ESTIMATE: NORMAL — SIGNIFICANT CHANGE UP
PO2 BLDCOA: 51 MMHG — HIGH (ref 17–41)
POTASSIUM SERPL-MCNC: 4.7 MMOL/L — SIGNIFICANT CHANGE UP (ref 3.5–5.3)
POTASSIUM SERPL-SCNC: 4.7 MMOL/L — SIGNIFICANT CHANGE UP (ref 3.5–5.3)
PROT SERPL-MCNC: 8.5 G/DL — HIGH (ref 6–8.3)
PROTHROM AB SERPL-ACNC: 40.3 SEC — HIGH (ref 10.6–13.6)
RAPID RVP RESULT: SIGNIFICANT CHANGE UP
RBC # BLD: 4.56 M/UL — SIGNIFICANT CHANGE UP (ref 4.2–5.8)
RBC # FLD: 14.1 % — SIGNIFICANT CHANGE UP (ref 10.3–14.5)
RBC BLD AUTO: NORMAL — SIGNIFICANT CHANGE UP
SAO2 % BLDCOV: 77 % — SIGNIFICANT CHANGE UP
SARS-COV-2 IGG+IGM SERPL QL IA: >250 U/ML — HIGH
SARS-COV-2 IGG+IGM SERPL QL IA: POSITIVE
SARS-COV-2 RNA SPEC QL NAA+PROBE: SIGNIFICANT CHANGE UP
SODIUM SERPL-SCNC: 135 MMOL/L — SIGNIFICANT CHANGE UP (ref 135–145)
SODIUM UR-SCNC: 8 MMOL/L — SIGNIFICANT CHANGE UP
TRIGL SERPL-MCNC: 93 MG/DL — SIGNIFICANT CHANGE UP
TROPONIN I SERPL-MCNC: 0.03 NG/ML — SIGNIFICANT CHANGE UP (ref 0–0.04)
TROPONIN I SERPL-MCNC: 0.05 NG/ML — HIGH (ref 0–0.04)
TSH SERPL-MCNC: 0.16 UU/ML — LOW (ref 0.34–4.82)
WBC # BLD: 5.7 K/UL — SIGNIFICANT CHANGE UP (ref 3.8–10.5)
WBC # FLD AUTO: 5.7 K/UL — SIGNIFICANT CHANGE UP (ref 3.8–10.5)

## 2021-07-09 PROCEDURE — 99232 SBSQ HOSP IP/OBS MODERATE 35: CPT

## 2021-07-09 PROCEDURE — 71250 CT THORAX DX C-: CPT | Mod: 26

## 2021-07-09 RX ORDER — INSULIN LISPRO 100/ML
3 VIAL (ML) SUBCUTANEOUS
Refills: 0 | Status: DISCONTINUED | OUTPATIENT
Start: 2021-07-09 | End: 2021-07-10

## 2021-07-09 RX ORDER — LOSARTAN POTASSIUM 100 MG/1
1 TABLET, FILM COATED ORAL
Qty: 0 | Refills: 0 | DISCHARGE

## 2021-07-09 RX ORDER — FUROSEMIDE 40 MG
40 TABLET ORAL DAILY
Refills: 0 | Status: DISCONTINUED | OUTPATIENT
Start: 2021-07-09 | End: 2021-07-11

## 2021-07-09 RX ORDER — SODIUM CHLORIDE 9 MG/ML
1000 INJECTION, SOLUTION INTRAVENOUS
Refills: 0 | Status: DISCONTINUED | OUTPATIENT
Start: 2021-07-09 | End: 2021-07-11

## 2021-07-09 RX ORDER — DEXTROSE 50 % IN WATER 50 %
25 SYRINGE (ML) INTRAVENOUS ONCE
Refills: 0 | Status: DISCONTINUED | OUTPATIENT
Start: 2021-07-09 | End: 2021-07-11

## 2021-07-09 RX ORDER — INSULIN LISPRO 100/ML
VIAL (ML) SUBCUTANEOUS
Refills: 0 | Status: DISCONTINUED | OUTPATIENT
Start: 2021-07-09 | End: 2021-07-09

## 2021-07-09 RX ORDER — ATORVASTATIN CALCIUM 80 MG/1
80 TABLET, FILM COATED ORAL AT BEDTIME
Refills: 0 | Status: DISCONTINUED | OUTPATIENT
Start: 2021-07-09 | End: 2021-07-15

## 2021-07-09 RX ORDER — ALBUTEROL 90 UG/1
1 AEROSOL, METERED ORAL EVERY 4 HOURS
Refills: 0 | Status: COMPLETED | OUTPATIENT
Start: 2021-07-09 | End: 2022-06-07

## 2021-07-09 RX ORDER — RANOLAZINE 500 MG/1
1 TABLET, FILM COATED, EXTENDED RELEASE ORAL
Qty: 0 | Refills: 0 | DISCHARGE

## 2021-07-09 RX ORDER — FUROSEMIDE 40 MG
1 TABLET ORAL
Qty: 0 | Refills: 0 | DISCHARGE

## 2021-07-09 RX ORDER — GLUCAGON INJECTION, SOLUTION 0.5 MG/.1ML
1 INJECTION, SOLUTION SUBCUTANEOUS ONCE
Refills: 0 | Status: DISCONTINUED | OUTPATIENT
Start: 2021-07-09 | End: 2021-07-15

## 2021-07-09 RX ORDER — ATORVASTATIN CALCIUM 80 MG/1
1 TABLET, FILM COATED ORAL
Qty: 0 | Refills: 0 | DISCHARGE

## 2021-07-09 RX ORDER — ALBUTEROL 90 UG/1
2.5 AEROSOL, METERED ORAL EVERY 6 HOURS
Refills: 0 | Status: DISCONTINUED | OUTPATIENT
Start: 2021-07-09 | End: 2021-07-13

## 2021-07-09 RX ORDER — INSULIN GLARGINE 100 [IU]/ML
5 INJECTION, SOLUTION SUBCUTANEOUS AT BEDTIME
Refills: 0 | Status: DISCONTINUED | OUTPATIENT
Start: 2021-07-09 | End: 2021-07-10

## 2021-07-09 RX ORDER — AZITHROMYCIN 500 MG/1
500 TABLET, FILM COATED ORAL EVERY 24 HOURS
Refills: 0 | Status: DISCONTINUED | OUTPATIENT
Start: 2021-07-09 | End: 2021-07-12

## 2021-07-09 RX ORDER — DEXTROSE 50 % IN WATER 50 %
12.5 SYRINGE (ML) INTRAVENOUS ONCE
Refills: 0 | Status: DISCONTINUED | OUTPATIENT
Start: 2021-07-09 | End: 2021-07-11

## 2021-07-09 RX ORDER — BENZOCAINE AND MENTHOL 5; 1 G/100ML; G/100ML
1 LIQUID ORAL DAILY
Refills: 0 | Status: DISCONTINUED | OUTPATIENT
Start: 2021-07-09 | End: 2021-07-15

## 2021-07-09 RX ORDER — WARFARIN SODIUM 2.5 MG/1
4 TABLET ORAL ONCE
Refills: 0 | Status: DISCONTINUED | OUTPATIENT
Start: 2021-07-09 | End: 2021-07-09

## 2021-07-09 RX ORDER — SACUBITRIL AND VALSARTAN 24; 26 MG/1; MG/1
1 TABLET, FILM COATED ORAL
Refills: 0 | Status: DISCONTINUED | OUTPATIENT
Start: 2021-07-09 | End: 2021-07-09

## 2021-07-09 RX ORDER — ACETAMINOPHEN 500 MG
650 TABLET ORAL EVERY 6 HOURS
Refills: 0 | Status: DISCONTINUED | OUTPATIENT
Start: 2021-07-09 | End: 2021-07-15

## 2021-07-09 RX ORDER — ASPIRIN/CALCIUM CARB/MAGNESIUM 324 MG
1 TABLET ORAL
Qty: 0 | Refills: 0 | DISCHARGE

## 2021-07-09 RX ORDER — ISOSORBIDE DINITRATE 5 MG/1
1 TABLET ORAL
Qty: 0 | Refills: 0 | DISCHARGE

## 2021-07-09 RX ORDER — INSULIN LISPRO 100/ML
VIAL (ML) SUBCUTANEOUS
Refills: 0 | Status: DISCONTINUED | OUTPATIENT
Start: 2021-07-09 | End: 2021-07-10

## 2021-07-09 RX ORDER — CEFTRIAXONE 500 MG/1
1000 INJECTION, POWDER, FOR SOLUTION INTRAMUSCULAR; INTRAVENOUS EVERY 24 HOURS
Refills: 0 | Status: DISCONTINUED | OUTPATIENT
Start: 2021-07-09 | End: 2021-07-12

## 2021-07-09 RX ORDER — ISOSORBIDE MONONITRATE 60 MG/1
30 TABLET, EXTENDED RELEASE ORAL DAILY
Refills: 0 | Status: DISCONTINUED | OUTPATIENT
Start: 2021-07-09 | End: 2021-07-15

## 2021-07-09 RX ORDER — DEXTROSE 50 % IN WATER 50 %
15 SYRINGE (ML) INTRAVENOUS ONCE
Refills: 0 | Status: DISCONTINUED | OUTPATIENT
Start: 2021-07-09 | End: 2021-07-11

## 2021-07-09 RX ADMIN — Medication 650 MILLIGRAM(S): at 17:03

## 2021-07-09 RX ADMIN — ISOSORBIDE MONONITRATE 30 MILLIGRAM(S): 60 TABLET, EXTENDED RELEASE ORAL at 12:02

## 2021-07-09 RX ADMIN — Medication 100 MILLIGRAM(S): at 23:25

## 2021-07-09 RX ADMIN — Medication 60 MILLIGRAM(S): at 06:21

## 2021-07-09 RX ADMIN — SACUBITRIL AND VALSARTAN 1 TABLET(S): 24; 26 TABLET, FILM COATED ORAL at 06:21

## 2021-07-09 RX ADMIN — Medication 6: at 17:04

## 2021-07-09 RX ADMIN — Medication 100 MILLIGRAM(S): at 22:06

## 2021-07-09 RX ADMIN — AZITHROMYCIN 255 MILLIGRAM(S): 500 TABLET, FILM COATED ORAL at 17:04

## 2021-07-09 RX ADMIN — BENZOCAINE AND MENTHOL 1 LOZENGE: 5; 1 LIQUID ORAL at 17:20

## 2021-07-09 RX ADMIN — ATORVASTATIN CALCIUM 80 MILLIGRAM(S): 80 TABLET, FILM COATED ORAL at 22:05

## 2021-07-09 RX ADMIN — Medication 650 MILLIGRAM(S): at 17:43

## 2021-07-09 RX ADMIN — Medication 100 MILLIGRAM(S): at 17:03

## 2021-07-09 RX ADMIN — CEFTRIAXONE 100 MILLIGRAM(S): 500 INJECTION, POWDER, FOR SOLUTION INTRAMUSCULAR; INTRAVENOUS at 22:04

## 2021-07-09 RX ADMIN — INSULIN GLARGINE 5 UNIT(S): 100 INJECTION, SOLUTION SUBCUTANEOUS at 21:58

## 2021-07-09 RX ADMIN — Medication 40 MILLIEQUIVALENT(S): at 01:27

## 2021-07-09 RX ADMIN — Medication 3 UNIT(S): at 18:46

## 2021-07-09 RX ADMIN — ALBUTEROL 2.5 MILLIGRAM(S): 90 AEROSOL, METERED ORAL at 15:51

## 2021-07-09 RX ADMIN — ALBUTEROL 2.5 MILLIGRAM(S): 90 AEROSOL, METERED ORAL at 21:11

## 2021-07-09 RX ADMIN — Medication 40 MILLIGRAM(S): at 17:03

## 2021-07-09 NOTE — PROGRESS NOTE ADULT - PROBLEM SELECTOR PLAN 1
- sent from urgent care with SOB and hypoxia since last Friday, likely COPD Vx CHF excerebration  - saturating well on 2L  - BNP 4422  - trop 0.062  - CXR infiltrates noted on right side (f/u official read)  - c/w ceftriaxone, azithromycin, and steroid  - f/u trop 3  - start on albuterol  - Robitussin PRN

## 2021-07-09 NOTE — PROGRESS NOTE ADULT - ATTENDING COMMENTS
Patient seen and examined on bedside. [facility]  # [id number]. Patient seen and examined on bedside. [facility]  # [id number]. On 4 litre NC O2.  Bilateral diffuse ronchi, crackles head bilateral chest.    66 year old man with PMH as detailed above presenting with 1 week of progressive respiratory distress concerning for CHF exacerbation +/- and undiagnosed obstructive airway disease.   Underlying hx of multivessel CAD ( noted on remote cardiac cath ) and DM with suspicion for ischemic cardiomyopathy. Will repeat ECHO on this visit and allow cardiology evaluation for possible AICD evaluation and plans for management of multivessel CAD.    A/P  - Acute respiratory failure with hypoxia  - Suspected CHF  r/o obstructive airway disease vs.  pneumonia.  - EUGENIO creat 1.83 ( baseline 1.23)  - Hypokalemia  - Elevated troponin - likely demand ischemia   - h/o LV thrombus on Coumadin      Patient reports cough, sore throat and worsening sob x 5 days. reporst compliance to medications.  CXR showing Heart enlargement, large left ventricular calcified aneurysm, and mild CHF again noted.  Respiratory viral panel, follow CT chest w/o contrast.   On Ceftriaxone, Azithromycin.   IV steroids -solumedrol 40 mg q 12hr and taper with improvement. No h/o COPD h/o tobacco abuse quitted 25 years ago, but prior CXR showing hyperinflated lungs. bilateral ronchi. Supportive care with bronchodilators via MDI or nebs. Continue supplemental O2  Diuresis with lasix - 40mg IV daily; monitor renal fxn.  send urine lytes, will hold ARB. Diuresing, monitor renal fxn.  Serial troponin 0.062>0.047>0.027. follow d dimer.  Patient has h/o LV thrombus, on Coumadin. today INR 3.6, coumadin dose held today, follow INR and resume coumadin tomorrow depending on INR.  Follow ECHO 2D.   f/u Cardiology consult Patient seen and examined on bedside. [facility]  # [id number]. On 4 litre NC O2.  Bilateral diffuse ronchi, crackles head bilateral chest.    66 year old man with PMH CHF, afib ( coumadin), h/o LV thrombus, h/o multivessel CAD, h/o PAD, HTN, HLD, and DM presenting with 1 week of progressive respiratory distress concerning for CHF exacerbation +/- and undiagnosed obstructive airway disease.   Underlying hx of multivessel CAD ( noted on remote cardiac cath ) and DM with suspicion for ischemic cardiomyopathy. Will repeat ECHO on this visit and allow cardiology evaluation for possible AICD evaluation and plans for management of multivessel CAD.    A/P  - Acute respiratory failure with hypoxia  - Suspected CHF  r/o obstructive airway disease vs.  pneumonia.  - EUGENIO creat 1.83 ( baseline 1.23)  - Hypokalemia  - Elevated troponin - likely demand ischemia   - h/o LV thrombus on Coumadin      Patient reports cough, sore throat and worsening sob x 5 days. reporst compliance to medications.  CXR showing Heart enlargement, large left ventricular calcified aneurysm, and mild CHF again noted.  Respiratory viral panel, follow CT chest w/o contrast.   On Ceftriaxone, Azithromycin.   IV steroids -solumedrol 40 mg q 12hr and taper with improvement. No h/o COPD h/o tobacco abuse quitted 25 years ago, but prior CXR showing hyperinflated lungs. bilateral ronchi. Supportive care with bronchodilators via MDI or nebs. Continue supplemental O2  Diuresis with lasix - 40mg IV daily; monitor renal fxn.  send urine lytes, will hold ARB. Diuresing, monitor renal fxn.  Serial troponin 0.062>0.047>0.027. follow d dimer.  Patient has h/o LV thrombus, on Coumadin. today INR 3.6, coumadin dose held today, follow INR and resume coumadin tomorrow depending on INR.  Follow ECHO 2D.   f/u Cardiology consult

## 2021-07-09 NOTE — PROGRESS NOTE ADULT - SUBJECTIVE AND OBJECTIVE BOX
Patient is a 66y old  Male who presents with a chief complaint of COPD exacerbation (08 Jul 2021 20:32)    INTERVAL HPI/OVERNIGHT EVENTS: no new complaints ,mild respiratory distress, on oxygen     REVIEW OF SYSTEMS:  CONSTITUTIONAL: No fever, chills  ENMT:  No difficulty hearing, no change in vision  NECK: No pain or stiffness  RESPIRATORY: C/o cough, SOB  CARDIOVASCULAR: No chest pain, palpitations  GASTROINTESTINAL: No abdominal pain. No nausea, vomiting, or diarrhea  GENITOURINARY: No dysuria  NEUROLOGICAL: No HA  SKIN: No itching, burning, rashes, or lesions   LYMPH NODES: No enlarged glands  ENDOCRINE: No heat or cold intolerance; No hair loss  MUSCULOSKELETAL: No joint pain or swelling; No muscle, back, or extremity pain  PSYCHIATRIC: No depression, anxiety  HEME/LYMPH: No easy bruising, or bleeding gums    T(C): 36.5 (07-09-21 @ 07:17), Max: 37 (07-08-21 @ 20:04)  HR: 68 (07-09-21 @ 07:17) (68 - 90)  BP: 119/73 (07-09-21 @ 07:17) (119/73 - 151/83)  RR: 16 (07-09-21 @ 07:17) (16 - 18)  SpO2: 98% (07-09-21 @ 07:17) (84% - 99%)  Wt(kg): --Vital Signs Last 24 Hrs  T(C): 36.5 (09 Jul 2021 07:17), Max: 37 (08 Jul 2021 20:04)  T(F): 97.7 (09 Jul 2021 07:17), Max: 98.6 (08 Jul 2021 20:04)  HR: 68 (09 Jul 2021 07:17) (68 - 90)  BP: 119/73 (09 Jul 2021 07:17) (119/73 - 151/83)  BP(mean): --  RR: 16 (09 Jul 2021 07:17) (16 - 18)  SpO2: 98% (09 Jul 2021 07:17) (84% - 99%)    MEDICATIONS  (STANDING):  ALBUTerol    0.083% 2.5 milliGRAM(s) Nebulizer every 6 hours  ALBUTerol    90 MICROgram(s) HFA Inhaler 1 Puff(s) Inhalation every 4 hours  atorvastatin 80 milliGRAM(s) Oral at bedtime  azithromycin  IVPB 500 milliGRAM(s) IV Intermittent every 24 hours  cefTRIAXone   IVPB 1000 milliGRAM(s) IV Intermittent every 24 hours  isosorbide   mononitrate ER Tablet (IMDUR) 30 milliGRAM(s) Oral daily  methylPREDNISolone sodium succinate Injectable 40 milliGRAM(s) IV Push every 12 hours  sacubitril 24 mG/valsartan 26 mG 1 Tablet(s) Oral two times a day    MEDICATIONS  (PRN):  guaiFENesin Oral Liquid (Sugar-Free) 100 milliGRAM(s) Oral every 6 hours PRN Cough      PHYSICAL EXAM:  GENERAL: NAD  EYES: clear conjunctiva; EOMI  ENMT: Moist mucous membranes  NECK: Supple, No JVD, Normal thyroid  CHEST/LUNG: bilateral crackles and rhonchi auscultation  HEART: S1, S2, Regular rate and rhythm  ABDOMEN: Soft, Nontender, Nondistended; Bowel sounds present  NEURO: Alert & Oriented X3  EXTREMITIES: Generalized BLE edema, no calf tenderness  LYMPH: No lymphadenopathy noted  SKIN: No rashes or lesions    Consultant(s) Notes Reviewed:  [x ] YES  [ ] NO  Care Discussed with Consultants/Other Providers [ x] YES  [ ] NO    LABS:                        13.3   5.70  )-----------( 266      ( 09 Jul 2021 05:47 )             42.3     07-09    135  |  94<L>  |  37<H>  ----------------------------<  350<H>  4.7   |  34<H>  |  1.87<H>    Ca    9.8      09 Jul 2021 05:47  Phos  3.8     07-09  Mg     2.4     07-09    TPro  8.5<H>  /  Alb  3.1<L>  /  TBili  1.0  /  DBili  x   /  AST  32  /  ALT  29  /  AlkPhos  125<H>  07-09    PT/INR - ( 09 Jul 2021 05:47 )   PT: 40.3 sec;   INR: 3.60 ratio         PTT - ( 09 Jul 2021 05:47 )  PTT:47.1 sec  CAPILLARY BLOOD GLUCOSE    RADIOLOGY & ADDITIONAL TESTS:    Imaging Personally Reviewed:  [ x] YES  [ ] NO

## 2021-07-10 LAB
ALBUMIN SERPL ELPH-MCNC: 2.7 G/DL — LOW (ref 3.5–5)
ALP SERPL-CCNC: 115 U/L — SIGNIFICANT CHANGE UP (ref 40–120)
ALT FLD-CCNC: 24 U/L DA — SIGNIFICANT CHANGE UP (ref 10–60)
ANION GAP SERPL CALC-SCNC: 9 MMOL/L — SIGNIFICANT CHANGE UP (ref 5–17)
APTT BLD: 50.7 SEC — HIGH (ref 27.5–35.5)
AST SERPL-CCNC: 21 U/L — SIGNIFICANT CHANGE UP (ref 10–40)
BILIRUB SERPL-MCNC: 0.6 MG/DL — SIGNIFICANT CHANGE UP (ref 0.2–1.2)
BUN SERPL-MCNC: 45 MG/DL — HIGH (ref 7–18)
CALCIUM SERPL-MCNC: 9 MG/DL — SIGNIFICANT CHANGE UP (ref 8.4–10.5)
CHLORIDE SERPL-SCNC: 98 MMOL/L — SIGNIFICANT CHANGE UP (ref 96–108)
CO2 SERPL-SCNC: 29 MMOL/L — SIGNIFICANT CHANGE UP (ref 22–31)
CREAT SERPL-MCNC: 1.54 MG/DL — HIGH (ref 0.5–1.3)
GLUCOSE BLDC GLUCOMTR-MCNC: 368 MG/DL — HIGH (ref 70–99)
GLUCOSE BLDC GLUCOMTR-MCNC: 419 MG/DL — HIGH (ref 70–99)
GLUCOSE BLDC GLUCOMTR-MCNC: 430 MG/DL — HIGH (ref 70–99)
GLUCOSE BLDC GLUCOMTR-MCNC: 454 MG/DL — CRITICAL HIGH (ref 70–99)
GLUCOSE SERPL-MCNC: 373 MG/DL — HIGH (ref 70–99)
HAV IGM SER-ACNC: SIGNIFICANT CHANGE UP
HBV CORE IGM SER-ACNC: SIGNIFICANT CHANGE UP
HBV SURFACE AG SER-ACNC: SIGNIFICANT CHANGE UP
HCT VFR BLD CALC: 42.3 % — SIGNIFICANT CHANGE UP (ref 39–50)
HCV AB S/CO SERPL IA: 0.18 S/CO — SIGNIFICANT CHANGE UP (ref 0–0.99)
HCV AB SERPL-IMP: SIGNIFICANT CHANGE UP
HGB BLD-MCNC: 13 G/DL — SIGNIFICANT CHANGE UP (ref 13–17)
INR BLD: 3.62 RATIO — HIGH (ref 0.88–1.16)
LIDOCAIN IGE QN: 522 U/L — HIGH (ref 73–393)
MAGNESIUM SERPL-MCNC: 2.5 MG/DL — SIGNIFICANT CHANGE UP (ref 1.6–2.6)
MCHC RBC-ENTMCNC: 28.9 PG — SIGNIFICANT CHANGE UP (ref 27–34)
MCHC RBC-ENTMCNC: 30.7 GM/DL — LOW (ref 32–36)
MCV RBC AUTO: 94 FL — SIGNIFICANT CHANGE UP (ref 80–100)
NRBC # BLD: 0 /100 WBCS — SIGNIFICANT CHANGE UP (ref 0–0)
PHOSPHATE SERPL-MCNC: 3.7 MG/DL — SIGNIFICANT CHANGE UP (ref 2.5–4.5)
PLATELET # BLD AUTO: 261 K/UL — SIGNIFICANT CHANGE UP (ref 150–400)
POTASSIUM SERPL-MCNC: 3.9 MMOL/L — SIGNIFICANT CHANGE UP (ref 3.5–5.3)
POTASSIUM SERPL-SCNC: 3.9 MMOL/L — SIGNIFICANT CHANGE UP (ref 3.5–5.3)
PROT SERPL-MCNC: 7.5 G/DL — SIGNIFICANT CHANGE UP (ref 6–8.3)
PROTHROM AB SERPL-ACNC: 40.5 SEC — HIGH (ref 10.6–13.6)
RBC # BLD: 4.5 M/UL — SIGNIFICANT CHANGE UP (ref 4.2–5.8)
RBC # FLD: 14 % — SIGNIFICANT CHANGE UP (ref 10.3–14.5)
SODIUM SERPL-SCNC: 136 MMOL/L — SIGNIFICANT CHANGE UP (ref 135–145)
URATE UR-MCNC: 73.6 MG/DL — SIGNIFICANT CHANGE UP
WBC # BLD: 10.33 K/UL — SIGNIFICANT CHANGE UP (ref 3.8–10.5)
WBC # FLD AUTO: 10.33 K/UL — SIGNIFICANT CHANGE UP (ref 3.8–10.5)

## 2021-07-10 PROCEDURE — 99233 SBSQ HOSP IP/OBS HIGH 50: CPT | Mod: GC

## 2021-07-10 RX ORDER — INSULIN LISPRO 100/ML
6 VIAL (ML) SUBCUTANEOUS
Refills: 0 | Status: DISCONTINUED | OUTPATIENT
Start: 2021-07-10 | End: 2021-07-11

## 2021-07-10 RX ORDER — METOPROLOL TARTRATE 50 MG
25 TABLET ORAL DAILY
Refills: 0 | Status: DISCONTINUED | OUTPATIENT
Start: 2021-07-10 | End: 2021-07-15

## 2021-07-10 RX ORDER — INSULIN LISPRO 100/ML
VIAL (ML) SUBCUTANEOUS
Refills: 0 | Status: DISCONTINUED | OUTPATIENT
Start: 2021-07-10 | End: 2021-07-15

## 2021-07-10 RX ORDER — INSULIN GLARGINE 100 [IU]/ML
10 INJECTION, SOLUTION SUBCUTANEOUS AT BEDTIME
Refills: 0 | Status: DISCONTINUED | OUTPATIENT
Start: 2021-07-10 | End: 2021-07-11

## 2021-07-10 RX ORDER — INSULIN LISPRO 100/ML
VIAL (ML) SUBCUTANEOUS AT BEDTIME
Refills: 0 | Status: DISCONTINUED | OUTPATIENT
Start: 2021-07-10 | End: 2021-07-15

## 2021-07-10 RX ORDER — FUROSEMIDE 40 MG
40 TABLET ORAL DAILY
Refills: 0 | Status: COMPLETED | OUTPATIENT
Start: 2021-07-10 | End: 2021-07-10

## 2021-07-10 RX ORDER — PANTOPRAZOLE SODIUM 20 MG/1
40 TABLET, DELAYED RELEASE ORAL
Refills: 0 | Status: DISCONTINUED | OUTPATIENT
Start: 2021-07-10 | End: 2021-07-10

## 2021-07-10 RX ORDER — PANTOPRAZOLE SODIUM 20 MG/1
40 TABLET, DELAYED RELEASE ORAL
Refills: 0 | Status: DISCONTINUED | OUTPATIENT
Start: 2021-07-11 | End: 2021-07-15

## 2021-07-10 RX ORDER — SACUBITRIL AND VALSARTAN 24; 26 MG/1; MG/1
1 TABLET, FILM COATED ORAL
Refills: 0 | Status: DISCONTINUED | OUTPATIENT
Start: 2021-07-10 | End: 2021-07-15

## 2021-07-10 RX ADMIN — Medication 6 UNIT(S): at 16:47

## 2021-07-10 RX ADMIN — PANTOPRAZOLE SODIUM 40 MILLIGRAM(S): 20 TABLET, DELAYED RELEASE ORAL at 07:44

## 2021-07-10 RX ADMIN — Medication 40 MILLIGRAM(S): at 18:35

## 2021-07-10 RX ADMIN — CEFTRIAXONE 100 MILLIGRAM(S): 500 INJECTION, POWDER, FOR SOLUTION INTRAMUSCULAR; INTRAVENOUS at 21:47

## 2021-07-10 RX ADMIN — Medication 6 UNIT(S): at 11:57

## 2021-07-10 RX ADMIN — Medication 3 UNIT(S): at 07:49

## 2021-07-10 RX ADMIN — Medication 100 MILLIGRAM(S): at 05:47

## 2021-07-10 RX ADMIN — ALBUTEROL 2.5 MILLIGRAM(S): 90 AEROSOL, METERED ORAL at 20:38

## 2021-07-10 RX ADMIN — Medication 8: at 21:39

## 2021-07-10 RX ADMIN — INSULIN GLARGINE 10 UNIT(S): 100 INJECTION, SOLUTION SUBCUTANEOUS at 21:37

## 2021-07-10 RX ADMIN — ALBUTEROL 2.5 MILLIGRAM(S): 90 AEROSOL, METERED ORAL at 14:31

## 2021-07-10 RX ADMIN — Medication 40 MILLIGRAM(S): at 05:47

## 2021-07-10 RX ADMIN — ALBUTEROL 2.5 MILLIGRAM(S): 90 AEROSOL, METERED ORAL at 03:59

## 2021-07-10 RX ADMIN — Medication 5: at 07:49

## 2021-07-10 RX ADMIN — ATORVASTATIN CALCIUM 80 MILLIGRAM(S): 80 TABLET, FILM COATED ORAL at 21:46

## 2021-07-10 RX ADMIN — ALBUTEROL 2.5 MILLIGRAM(S): 90 AEROSOL, METERED ORAL at 08:30

## 2021-07-10 RX ADMIN — SACUBITRIL AND VALSARTAN 1 TABLET(S): 24; 26 TABLET, FILM COATED ORAL at 15:13

## 2021-07-10 RX ADMIN — Medication 6: at 16:47

## 2021-07-10 RX ADMIN — Medication 6: at 11:57

## 2021-07-10 RX ADMIN — AZITHROMYCIN 255 MILLIGRAM(S): 500 TABLET, FILM COATED ORAL at 16:50

## 2021-07-10 RX ADMIN — Medication 100 MILLIGRAM(S): at 21:47

## 2021-07-10 RX ADMIN — ISOSORBIDE MONONITRATE 30 MILLIGRAM(S): 60 TABLET, EXTENDED RELEASE ORAL at 11:59

## 2021-07-10 NOTE — CONSULT NOTE ADULT - ASSESSMENT
Patient is a 66 year old male, w/ PMH w/ HFrEF, Atrial Fibrillation, HTN, HLD, and DM, sent by urgent care due to hypoxia. Admitted for COPD exac    Problem/Plan - 1:  ·  Problem: SOB (shortness of breath).  Plan: - p/w SOB since last Friday  - Ddx: COPD exac, CHF exac or combo  - VS: O2 sat 84% prior admission  - PE: diffuse rhonchi noted b/l UL > LL  - WBC 7.29  - BNP 4422  - trop 0.062  - CXR infiltrates noted on right side (f/u official read)  - s/p ceft & azithro, steroid, albuterol  - c/w ceftriaxone, azithromycin, and steroid    Problem/Plan - 2:  ·  Problem: COPD exacerbation.  Plan: - no h/o COPD  - has extensive smoking history  - plan as above.     Problem/Plan - 3:  ·  Problem: .Chronic Systolic Heart Faiklure  Plan: - h/o HF, on Entresto, furosemide 40mg bid  TTE-EF 30-35% Large LV aneurysm  Continue HF meds      Problem/Plan - 4:  ·  Problem: EUGENIO (acute kidney injury).  Plan: - noted to have Cr 1.80; last Cr 1.23 (Jul 2021)  -2/2 diuresis    Problem/Plan - 5:  ·  Problem: Atrial Fibrillation CHADS2-5.  Plan: - h/o Afib and cardiac thrombus (?), on warfarin 4mg at home; not on rate-control meds  - EKG afib, RVR  - c/w home meds.     Problem/Plan - 6:  Problem: HTN (hypertension). Plan: - h/o HTN, on Imdur 60mg only at home  - c/w Imdur 30mg   - monitor BP  - increase as needed.    Problem/Plan - 7:  ·  Problem: HLD (hyperlipidemia).  Plan: - h/o HLD, on Rosuvastatin 40mg qHs at home; ASA recently d/c'd  - c/w atorvastatin 80mg qHs  - f/u lipid panel.     Problem/Plan - 8:  ·  Problem: Diabetes.  Plan: - h/o DM, on Metformin 1000mg bid, glyburide 5mg at home  - c/w sliding scale  - f/u a1c.     Problem/Plan - 9:  ·  Problem: Prophylactic measure.  Plan: on warfarin.

## 2021-07-10 NOTE — PROGRESS NOTE ADULT - SUBJECTIVE AND OBJECTIVE BOX
PGY-1 Progress Note discussed with attending    PAGER #: [[phone]] TILL 5:00 PM  PLEASE CONTACT ON CALL TEAM:  - On Call Team (Please refer to Deborah) FROM 5:00 PM - 8:30PM  - Nightfloat Team FROM 8:30 -7:30 AM    CHIEF COMPLAINT & BRIEF HOSPITAL COURSE:    INTERVAL HPI/OVERNIGHT EVENTS:   Patient seen and examined at bedside. No acute events overnight.    REVIEW OF SYSTEMS:  CONSTITUTIONAL: No fever, weight loss, or fatigue  RESPIRATORY: No cough, wheezing, chills or hemoptysis; No shortness of breath  CARDIOVASCULAR: No chest pain, palpitations, dizziness, or leg swelling  GASTROINTESTINAL: No abdominal pain. No nausea, vomiting, or hematemesis; No diarrhea or constipation. No melena or hematochezia.  GENITOURINARY: No dysuria or hematuria, urinary frequency  NEUROLOGICAL: No headaches, memory loss, loss of strength, numbness, or tremors  SKIN: No itching, burning, rashes, or lesions     Vital Signs Last 24 Hrs  T(C): 36.7 (10 Jul 2021 11:23), Max: 36.7 (10 Jul 2021 11:23)  T(F): 98.1 (10 Jul 2021 11:23), Max: 98.1 (10 Jul 2021 11:23)  HR: 87 (10 Jul 2021 11:23) (71 - 87)  BP: 117/53 (10 Jul 2021 11:23) (112/53 - 137/67)  BP(mean): --  RR: 19 (10 Jul 2021 11:23) (17 - 19)  SpO2: 98% (10 Jul 2021 11:23) (92% - 99%)    PHYSICAL EXAMINATION:  GENERAL: NAD, well built  HEAD:  Atraumatic, Normocephalic  CHEST/LUNG: bilateral crackles on auscultation  HEART: Regular rate and rhythm; No murmurs, rubs, or gallops  ABDOMEN: Soft, Nontender, Nondistended; Bowel sounds present  NERVOUS SYSTEM:  Alert & Oriented X3,    EXTREMITIES:  2+ Peripheral Pulses, No clubbing, cyanosis, or edema  SKIN: warm dry                          13.0   10.33 )-----------( 261      ( 10 Jul 2021 06:45 )             42.3     07-10    136  |  98  |  45<H>  ----------------------------<  373<H>  3.9   |  29  |  1.54<H>    Ca    9.0      10 Jul 2021 06:45  Phos  3.7     07-10  Mg     2.5     07-10    TPro  7.5  /  Alb  2.7<L>  /  TBili  0.6  /  DBili  x   /  AST  21  /  ALT  24  /  AlkPhos  115  07-10    LIVER FUNCTIONS - ( 10 Jul 2021 06:45 )  Alb: 2.7 g/dL / Pro: 7.5 g/dL / ALK PHOS: 115 U/L / ALT: 24 U/L DA / AST: 21 U/L / GGT: x           CARDIAC MARKERS ( 09 Jul 2021 10:17 )  0.027 ng/mL / x     / x     / x     / x      CARDIAC MARKERS ( 09 Jul 2021 01:26 )  0.047 ng/mL / x     / x     / x     / x      CARDIAC MARKERS ( 08 Jul 2021 18:45 )  0.062 ng/mL / x     / x     / x     / x          PT/INR - ( 10 Jul 2021 06:45 )   PT: 40.5 sec;   INR: 3.62 ratio         PTT - ( 10 Jul 2021 06:45 )  PTT:50.7 sec    CAPILLARY BLOOD GLUCOSE      RADIOLOGY & ADDITIONAL TESTS:          < from: CT Chest No Cont (07.09.21 @ 17:04) >  IMPRESSION:    Centrilobular micronodules within the posterior right upper lobe, lateral segment of right middle lobe and bibasilar lower lobes likely representing impacted distal airways or bronchiolitis.Aspiration is another possibility.    No consolidation or pulmonary edema.    8 mm subpleural nodule within the anterior right upper lobe (series 3 image 53) is new from prior. Recommend follow-up chest CT in 3 months.    Large aneurysm measuring approximately 10 x 8 cm of the inferior wall of of the left ventricle with peripheral calcification is again noted.    3.5 cm partially imaged exophytic right renal lesion measuring higher than simple fluid. Recommend further evaluation with ultrasound or CT.    2.2 x 1.7 cm subcutaneous left anterior chest wall nodule at the level of the 4th rib (series 3 image 74) is new from prior. Further evaluation can be performed with ultrasound.    < end of copied text >  < from: Transthoracic Echocardiogram (07.10.21 @ 06:41) >  CONCLUSIONS:  1. Mitral annular calcification. Mild mitral regurgitation.    2. Calcified trileaflet aortic valve with decreased  opening. Peak transaortic valve gradient equals 43 mm Hg,  mean transaortic valve gradient equals 26 mm Hg, estimated  aortic valve area equals 1.5 sqcm (by continuity equation),  consistent with moderate aortic stenosis.  3. Aortic Root: 3.6 cm.    4. Normal left atrium.  5. Mild concentric left ventricular hypertrophy.  6. SEvere segmental left ventricular systolic  dysfunction.There is a 8x7 cm baseal  inferior wall  aneurysmal  and akinetic with 4.7x 3.7 cm thrombus.The  remainder of the LV is hypokinetic, EF30-35%.  7. Grade II diastolic dysfunction.  8. Normal right atrium.  9. Normal right ventricular size and function.  10. Normal tricuspid valve.  11. There is mild pulmonic regurgitation.  12. Normal pericardium with no pericardial effusion.  13. notified of above finding.    < end of copied text >

## 2021-07-10 NOTE — PROGRESS NOTE ADULT - ATTENDING COMMENTS
Patient admitted for COPD vs CHF exacarbation. Continue CTX/Azithro, steroid taper, and bronchodilators. Continue with IV Lasix, monitor Cr, and hold Entresto. Echo reviewed, notable for inferior wall aneurysmal and 4.7x 3.7 cm thrombus. F/up with cardiology for further recs. Already on coumadin w/ elevated INR, continue to hold coumadin until INR < 3.0. Will need outpatient CT chest in 3 months to follow up on nodule.

## 2021-07-10 NOTE — PROGRESS NOTE ADULT - PROBLEM SELECTOR PLAN 1
- sent from urgent care with SOB and hypoxia since last Friday, likely COPD Vx CHF excerebration  - saturating well on 2L  - CXR infiltrates noted on right side (f/u official read)  - c/w ceftriaxone, azithromycin, and steroid  - troponin trended down  - BNP trending down  - start on albuterol  - Robitussin PRN    - No consolidation or pulmonary edema.    8 mm subpleural nodule within the anterior right upper lobe (series 3 image 53) is new from prior. Recommend follow-up chest CT in 3 months.    Large aneurysm measuring approximately 10 x 8 cm of the inferior wall of of the left ventricle with peripheral calcification is again noted.    3.5 cm partially imaged exophytic right renal lesion measuring higher than simple fluid. Recommend further evaluation with ultrasound or CT.    2.2 x 1.7 cm subcutaneous left anterior chest wall nodule at the level of the 4th rib (series 3 image 74) is new from prior. Further evaluation can be performed with ultrasound.

## 2021-07-10 NOTE — CONSULT NOTE ADULT - TIME BILLING
- Review of records, telemetry, vital signs and daily labs.   - General and cardiovascular physical examination.  - Generation of cardiovascular treatment plan.  - Coordination of care.      Patient was seen and examined by me on 07/10/2021,interim events noted,labs and radiology studies reviewed.  Teto Gonzalez MD,FACC.  [address] [place], [place] [id number].  [phone]

## 2021-07-10 NOTE — CONSULT NOTE ADULT - SUBJECTIVE AND OBJECTIVE BOX
DATE OF SERVICE:  07/10/2021  Patient was seen,examined and evaluated  by me.ER evaluation, Labs and Hospital course was reviewed,    CHIEF COMPLAINT:Dyspnea    HPI:Patient is a 66 year old male,known from Office visits w/ PMH w/ HFrEF-LV aneurysm with thrombus on Coumadin , AF, HTN, HLD, and DM, sent by urgent care due to hypoxia.   He reports difficult breathing since last Friday. He reports no inciting factor, including recent traveling. He endorses unable to lay flat on surface, diaphoresis, recent wt loss of 10lb in a month, and generalized weakness. He denies fever, chills, n/v, chest pain, abdominal pain, urinary or bowel movement changes.  Been on HF meds including ntresto,Furosemide    PAST MEDICAL & SURGICAL HISTORY:  Hypertension    Hyperlipidemia    CAD -Ischemic Cardiomyopathy with LV Apical aneurym with thrombus    No significant past surgical history        MEDICATIONS  (STANDING):  ALBUTerol    0.083% 2.5 milliGRAM(s) Nebulizer every 6 hours  ALBUTerol    90 MICROgram(s) HFA Inhaler 1 Puff(s) Inhalation every 4 hours  atorvastatin 80 milliGRAM(s) Oral at bedtime  azithromycin  IVPB 500 milliGRAM(s) IV Intermittent every 24 hours  cefTRIAXone   IVPB 1000 milliGRAM(s) IV Intermittent every 24 hours  furosemide   Injectable 40 milliGRAM(s) IV Push daily  furosemide   Injectable 40 milliGRAM(s) IV Push daily  glucagon  Injectable 1 milliGRAM(s) IntraMuscular once  insulin glargine Injectable (LANTUS) 10 Unit(s) SubCutaneous at bedtime  insulin lispro (ADMELOG) corrective regimen sliding scale   SubCutaneous three times a day before meals  insulin lispro Injectable (ADMELOG) 6 Unit(s) SubCutaneous three times a day before meals  isosorbide   mononitrate ER Tablet (IMDUR) 30 milliGRAM(s) Oral daily  methylPREDNISolone sodium succinate Injectable 40 milliGRAM(s) IV Push every 12 hours  pantoprazole    Tablet 40 milliGRAM(s) Oral before breakfast    MEDICATIONS  (PRN):  acetaminophen   Tablet .. 650 milliGRAM(s) Oral every 6 hours PRN Moderate Pain (4 - 6), Severe Pain (7 - 10)  benzocaine 15 mG/menthol 3.6 mG (Sugar-Free) Lozenge 1 Lozenge Oral daily PRN Sore Throat  benzonatate 100 milliGRAM(s) Oral three times a day PRN Cough  guaiFENesin Oral Liquid (Sugar-Free) 100 milliGRAM(s) Oral every 6 hours PRN Cough      FAMILY HISTORY:  No pertinent family history in first degree relatives      No family history of premature coronary artery disease or sudden cardiac death    SOCIAL HISTORY:  Smoking-[ ] Active  [x ] Former [ ] Non Smoker  Alcohol-[x ] Denies [ ] Social [ ] Daily  Ilicit Drug use-[x ] Denies [ ] Active user    REVIEW OF SYSTEMS:  Constitutional: [ ] fever, [ ]weight loss, [x ]fatigue   Activity [ ] Bedbound,[x ] Ambulates [x ] Unassisted[ ] Cane/Walker [ ] Assistence.  Effort tolerance:[ ] Excellent [ ] Good [ ] Fair [x ] Poor [ ]  Eyes: [ ] visual changes  Respiratory: [x ]shortness of breath;  [x ] cough, [ ]wheezing, [ ]chills, [ ]hemoptysis  Cardiovascular: [ ] chest pain, [ ]palpitations, [ ]dizziness,  [x ]leg swelling[ ]orthopnea [ ]PND  Gastrointestinal: [ ] abdominal pain, [ ]nausea, [ ]vomiting,  [ ]diarrhea,[ ]constipation  Genitourinary: [ ] dysuria, [ ] hematuria  Neurologic: [ ] headaches [ ] tremors[ ] weakness  Skin: [ ] itching, [ ]burning, [ ] rashes  Endocrine: [ ] heat or cold intolerance  Musculoskeletal: [ ] joint pain or swelling; [ ] muscle, back, or extremity pain  Psychiatric: [ ] depression, [ ]anxiety, [ ]mood swings, or [ ]difficulty sleeping  Hematologic: [ ] easy bruising, [ ] bleeding gums       [ x] All others negative	  [ ] Unable to obtain    Vital Signs Last 24 Hrs  T(C): 36.7 (10 Jul 2021 11:23), Max: 36.7 (10 Jul 2021 11:23)  T(F): 98.1 (10 Jul 2021 11:23), Max: 98.1 (10 Jul 2021 11:23)  HR: 87 (10 Jul 2021 11:23) (71 - 87)  BP: 117/53 (10 Jul 2021 11:23) (112/53 - 137/67)  RR: 19 (10 Jul 2021 11:23) (17 - 19)  SpO2: 98% (10 Jul 2021 11:23) (92% - 99%)  I&O's Summary      PHYSICAL EXAM:  General: No acute distress BMI-30  HEENT: EOMI, PERRL[ ] Icteric  Neck: Supple, No JVD  Lungs: Equal air entry bilaterally; [ ] Rales [x ] Rhonchi [x ] Wheezing  Heart: Regular rate and rhythm;[x ] Murmurs-  2 /6 [x ] Systolic [ ] Diastolic [ ] Radiation,No rubs, or gallops  Abdomen: Nontender, bowel sounds present  Extremities: No clubbing, cyanosis, or edema[ ] Calf tenderness  Nervous system:  Alert & Oriented X3, no focal deficits  Psychiatric: Normal affect  Skin: No rashes or lesions      LABS:  07-10    136  |  98  |  45<H>  ----------------------------<  373<H>  3.9   |  29  |  1.54<H>    Ca    9.0      10 Jul 2021 06:45  Phos  3.7     07-10  Mg     2.5     07-10    TPro  7.5  /  Alb  2.7<L>  /  TBili  0.6  /  DBili  x   /  AST  21  /  ALT  24  /  AlkPhos  115  07-10    Creatinine Trend: 1.54<--, 1.87<--, 1.80<--, 1.23<--                        13.0   10.33 )-----------( 261      ( 10 Jul 2021 06:45 )             42.3     PT/INR - ( 10 Jul 2021 06:45 )   PT: 40.5 sec;   INR: 3.62 ratio         PTT - ( 10 Jul 2021 06:45 )  PTT:50.7 sec    Lipid Panel:   Cardiac Enzymes: CARDIAC MARKERS ( 09 Jul 2021 10:17 )  0.027 ng/mL / x     / x     / x     / x      CARDIAC MARKERS ( 09 Jul 2021 01:26 )  0.047 ng/mL / x     / x     / x     / x      CARDIAC MARKERS ( 08 Jul 2021 18:45 )  0.062 ng/mL / x     / x     / x     / x          Serum Pro-Brain Natriuretic Peptide: 4422 pg/mL (07-08-21 @ 18:45)        RADIOLOGY:  CT CHEST         IMPRESSION:  Centrilobular micronodules within the posterior right upper lobe, lateral segment of right middle lobe and bibasilar lower lobes likely representing impacted distal airways or bronchiolitis.Aspiration is another possibility.  No consolidation or pulmonary edema.  8 mm subpleural nodule within the anterior right upper lobe (series 3 image 53) is new from prior. Recommend follow-up chest CT in 3 months.    Large aneurysm measuring approximately 10 x 8 cm of the inferior wall of of the left ventricle with peripheral calcification is again noted.    3.5 cm partially imaged exophytic right renal lesion measuring higher than simple fluid. Recommend further evaluation with ultrasound or CT.  2.2 x 1.7 cm subcutaneous left anterior chest wall nodule at the level of the 4th rib (series 3 image 74) is new from prior. Further evaluation can be performed with ultrasound.      ECG [my interpretation]:Atrial Fibrillation Inferior infarct pattern    TELEMETRY:Atrial Fibrillation    ECHO:Study Date: 7/10/2021  CONCLUSIONS:  1. Mitral annular calcification. Mild mitral regurgitation.  2. Calcified trileaflet aortic valve with decreased opening. Peak transaortic valve gradient equals 43 mm Hg, mean transaortic valve gradient equals 26 mm Hg, estimated aortic valve area equals 1.5 sqcm (by continuity equation), consistent with moderate aortic stenosis.  3. Aortic Root: 3.6 cm.  4. Normal left atrium.  5. Mild concentric left ventricular hypertrophy.  6. SEvere segmental left ventricular systolic dysfunction.There is a 8x7 cm baseal  inferior wall aneurysmal  and akinetic with 4.7x 3.7 cm thrombus.The remainder of the LV is hypokinetic, EF30-35%.  7. Grade II diastolic dysfunction.  8. Normal right atrium.  9. Normal right ventricular size and function.  10. Normal tricuspid valve.  11. There is mild pulmonic regurgitation.  12. Normal pericardium with no pericardial effusion.    CATHETERIZATION:Study date: 09/25/2017\  Referring Physician:  Teto Gonzalez M.D.    VENTRICLES: Global left ventricular function was moderately depressed. EF estimated was 35 %. There was a large aneurysm involving the posterobasal wall with evidence of calcification and likely thrombus.  VALVES: MITRAL VALVE: The mitral valve was evaluated by left ventriculography.  The mitral valve exhibited moderate to severe regurgitation.  CORONARY VESSELS: The coronary circulation is right dominant.  LM:   --  LM: Angiography showed minor luminal irregularities with no flow limiting lesions.  --  Distal left main: There was a discrete 20 % stenosis.  LAD:   --  Proximal LAD: There was a discrete 40 % stenosis.  --  Mid LAD: There was a tubular 70 % stenosis. There was ZAYRA grade 3 flow through the vessel (brisk flow). --  D1: Angiography showed minor luminal irregularities with no flow limiting lesions.  CX:   --  Circumflex: Angiography showed minor luminal irregularities with no flow limiting lesions.  RCA:   --  Proximal RCA: There was a 100 % stenosis. There was ZAYRA grade 0vessel (no flow).  COMPLICATIONS: There were no complications.  DIAGNOSTIC RECOMMENDATIONS: Medical management is recommended. Echocardiogram to assess mitral regurgitation severity.

## 2021-07-10 NOTE — PROGRESS NOTE ADULT - PROBLEM SELECTOR PLAN 8
- on Metformin 1000mg bid, glyburide 5mg at home  - a1c 8.8  - c/w sliding scale. increased Lantus from 5 to 10. Increased Lispro from 2 to 6

## 2021-07-11 LAB
ALBUMIN SERPL ELPH-MCNC: 2.5 G/DL — LOW (ref 3.5–5)
ALP SERPL-CCNC: 132 U/L — HIGH (ref 40–120)
ALT FLD-CCNC: 25 U/L DA — SIGNIFICANT CHANGE UP (ref 10–60)
ANION GAP SERPL CALC-SCNC: 9 MMOL/L — SIGNIFICANT CHANGE UP (ref 5–17)
APTT BLD: 36.9 SEC — HIGH (ref 27.5–35.5)
AST SERPL-CCNC: 22 U/L — SIGNIFICANT CHANGE UP (ref 10–40)
BILIRUB SERPL-MCNC: 0.3 MG/DL — SIGNIFICANT CHANGE UP (ref 0.2–1.2)
BUN SERPL-MCNC: 42 MG/DL — HIGH (ref 7–18)
CALCIUM SERPL-MCNC: 8.5 MG/DL — SIGNIFICANT CHANGE UP (ref 8.4–10.5)
CHLORIDE SERPL-SCNC: 95 MMOL/L — LOW (ref 96–108)
CO2 SERPL-SCNC: 29 MMOL/L — SIGNIFICANT CHANGE UP (ref 22–31)
CREAT SERPL-MCNC: 1.72 MG/DL — HIGH (ref 0.5–1.3)
GLUCOSE BLDC GLUCOMTR-MCNC: 219 MG/DL — HIGH (ref 70–99)
GLUCOSE BLDC GLUCOMTR-MCNC: 363 MG/DL — HIGH (ref 70–99)
GLUCOSE BLDC GLUCOMTR-MCNC: 372 MG/DL — HIGH (ref 70–99)
GLUCOSE BLDC GLUCOMTR-MCNC: 426 MG/DL — HIGH (ref 70–99)
GLUCOSE BLDC GLUCOMTR-MCNC: 515 MG/DL — CRITICAL HIGH (ref 70–99)
GLUCOSE SERPL-MCNC: 500 MG/DL — CRITICAL HIGH (ref 70–99)
HCT VFR BLD CALC: 41.6 % — SIGNIFICANT CHANGE UP (ref 39–50)
HGB BLD-MCNC: 13.2 G/DL — SIGNIFICANT CHANGE UP (ref 13–17)
INR BLD: 2.82 RATIO — HIGH (ref 0.88–1.16)
MAGNESIUM SERPL-MCNC: 2.4 MG/DL — SIGNIFICANT CHANGE UP (ref 1.6–2.6)
MCHC RBC-ENTMCNC: 29.7 PG — SIGNIFICANT CHANGE UP (ref 27–34)
MCHC RBC-ENTMCNC: 31.7 GM/DL — LOW (ref 32–36)
MCV RBC AUTO: 93.5 FL — SIGNIFICANT CHANGE UP (ref 80–100)
NRBC # BLD: 0 /100 WBCS — SIGNIFICANT CHANGE UP (ref 0–0)
PHOSPHATE SERPL-MCNC: 2.5 MG/DL — SIGNIFICANT CHANGE UP (ref 2.5–4.5)
PLATELET # BLD AUTO: 269 K/UL — SIGNIFICANT CHANGE UP (ref 150–400)
POTASSIUM SERPL-MCNC: 4.2 MMOL/L — SIGNIFICANT CHANGE UP (ref 3.5–5.3)
POTASSIUM SERPL-SCNC: 4.2 MMOL/L — SIGNIFICANT CHANGE UP (ref 3.5–5.3)
PROT SERPL-MCNC: 7.8 G/DL — SIGNIFICANT CHANGE UP (ref 6–8.3)
PROTHROM AB SERPL-ACNC: 31.9 SEC — HIGH (ref 10.6–13.6)
RBC # BLD: 4.45 M/UL — SIGNIFICANT CHANGE UP (ref 4.2–5.8)
RBC # FLD: 14.1 % — SIGNIFICANT CHANGE UP (ref 10.3–14.5)
SODIUM SERPL-SCNC: 133 MMOL/L — LOW (ref 135–145)
WBC # BLD: 11.5 K/UL — HIGH (ref 3.8–10.5)
WBC # FLD AUTO: 11.5 K/UL — HIGH (ref 3.8–10.5)

## 2021-07-11 PROCEDURE — 99233 SBSQ HOSP IP/OBS HIGH 50: CPT

## 2021-07-11 RX ORDER — WARFARIN SODIUM 2.5 MG/1
2 TABLET ORAL AT BEDTIME
Refills: 0 | Status: COMPLETED | OUTPATIENT
Start: 2021-07-11 | End: 2021-07-11

## 2021-07-11 RX ORDER — INSULIN GLARGINE 100 [IU]/ML
15 INJECTION, SOLUTION SUBCUTANEOUS AT BEDTIME
Refills: 0 | Status: DISCONTINUED | OUTPATIENT
Start: 2021-07-11 | End: 2021-07-12

## 2021-07-11 RX ORDER — FUROSEMIDE 40 MG
40 TABLET ORAL DAILY
Refills: 0 | Status: DISCONTINUED | OUTPATIENT
Start: 2021-07-12 | End: 2021-07-15

## 2021-07-11 RX ORDER — WARFARIN SODIUM 2.5 MG/1
2 TABLET ORAL AT BEDTIME
Refills: 0 | Status: DISCONTINUED | OUTPATIENT
Start: 2021-07-11 | End: 2021-07-11

## 2021-07-11 RX ORDER — INSULIN LISPRO 100/ML
8 VIAL (ML) SUBCUTANEOUS
Refills: 0 | Status: DISCONTINUED | OUTPATIENT
Start: 2021-07-11 | End: 2021-07-12

## 2021-07-11 RX ADMIN — SACUBITRIL AND VALSARTAN 1 TABLET(S): 24; 26 TABLET, FILM COATED ORAL at 05:59

## 2021-07-11 RX ADMIN — SACUBITRIL AND VALSARTAN 1 TABLET(S): 24; 26 TABLET, FILM COATED ORAL at 18:11

## 2021-07-11 RX ADMIN — Medication 650 MILLIGRAM(S): at 05:58

## 2021-07-11 RX ADMIN — INSULIN GLARGINE 15 UNIT(S): 100 INJECTION, SOLUTION SUBCUTANEOUS at 21:56

## 2021-07-11 RX ADMIN — PANTOPRAZOLE SODIUM 40 MILLIGRAM(S): 20 TABLET, DELAYED RELEASE ORAL at 06:04

## 2021-07-11 RX ADMIN — Medication 8 UNIT(S): at 18:11

## 2021-07-11 RX ADMIN — WARFARIN SODIUM 2 MILLIGRAM(S): 2.5 TABLET ORAL at 21:58

## 2021-07-11 RX ADMIN — Medication 25 MILLIGRAM(S): at 06:04

## 2021-07-11 RX ADMIN — Medication 8 UNIT(S): at 12:28

## 2021-07-11 RX ADMIN — ALBUTEROL 2.5 MILLIGRAM(S): 90 AEROSOL, METERED ORAL at 15:20

## 2021-07-11 RX ADMIN — Medication 100 MILLIGRAM(S): at 18:15

## 2021-07-11 RX ADMIN — CEFTRIAXONE 100 MILLIGRAM(S): 500 INJECTION, POWDER, FOR SOLUTION INTRAMUSCULAR; INTRAVENOUS at 21:58

## 2021-07-11 RX ADMIN — ATORVASTATIN CALCIUM 80 MILLIGRAM(S): 80 TABLET, FILM COATED ORAL at 21:58

## 2021-07-11 RX ADMIN — ISOSORBIDE MONONITRATE 30 MILLIGRAM(S): 60 TABLET, EXTENDED RELEASE ORAL at 12:28

## 2021-07-11 RX ADMIN — Medication 100 MILLIGRAM(S): at 18:16

## 2021-07-11 RX ADMIN — Medication 40 MILLIGRAM(S): at 05:56

## 2021-07-11 RX ADMIN — ALBUTEROL 2.5 MILLIGRAM(S): 90 AEROSOL, METERED ORAL at 08:44

## 2021-07-11 RX ADMIN — ALBUTEROL 2.5 MILLIGRAM(S): 90 AEROSOL, METERED ORAL at 20:24

## 2021-07-11 RX ADMIN — Medication 10: at 18:11

## 2021-07-11 RX ADMIN — Medication 12: at 12:28

## 2021-07-11 RX ADMIN — Medication 12: at 08:09

## 2021-07-11 RX ADMIN — AZITHROMYCIN 255 MILLIGRAM(S): 500 TABLET, FILM COATED ORAL at 18:10

## 2021-07-11 RX ADMIN — Medication 650 MILLIGRAM(S): at 06:45

## 2021-07-11 RX ADMIN — Medication 100 MILLIGRAM(S): at 06:04

## 2021-07-11 RX ADMIN — Medication 6 UNIT(S): at 08:08

## 2021-07-11 NOTE — PROGRESS NOTE ADULT - SUBJECTIVE AND OBJECTIVE BOX
Patient seen and examined this morning. States he's still coughing a bit but overall feels better, less SOB. Denies any other acute complaints.    acetaminophen   Tablet .. 650 milliGRAM(s) Oral every 6 hours PRN  ALBUTerol    0.083% 2.5 milliGRAM(s) Nebulizer every 6 hours  ALBUTerol    90 MICROgram(s) HFA Inhaler 1 Puff(s) Inhalation every 4 hours  atorvastatin 80 milliGRAM(s) Oral at bedtime  azithromycin  IVPB 500 milliGRAM(s) IV Intermittent every 24 hours  benzocaine 15 mG/menthol 3.6 mG (Sugar-Free) Lozenge 1 Lozenge Oral daily PRN  benzonatate 100 milliGRAM(s) Oral three times a day PRN  cefTRIAXone   IVPB 1000 milliGRAM(s) IV Intermittent every 24 hours  furosemide   Injectable 40 milliGRAM(s) IV Push daily  glucagon  Injectable 1 milliGRAM(s) IntraMuscular once  guaiFENesin Oral Liquid (Sugar-Free) 100 milliGRAM(s) Oral every 6 hours PRN  insulin glargine Injectable (LANTUS) 15 Unit(s) SubCutaneous at bedtime  insulin lispro (ADMELOG) corrective regimen sliding scale   SubCutaneous three times a day before meals  insulin lispro (ADMELOG) corrective regimen sliding scale   SubCutaneous at bedtime  insulin lispro Injectable (ADMELOG) 8 Unit(s) SubCutaneous three times a day before meals  isosorbide   mononitrate ER Tablet (IMDUR) 30 milliGRAM(s) Oral daily  metoprolol succinate ER 25 milliGRAM(s) Oral daily  pantoprazole    Tablet 40 milliGRAM(s) Oral before breakfast  sacubitril 24 mG/valsartan 26 mG 1 Tablet(s) Oral two times a day  warfarin 2 milliGRAM(s) Oral at bedtime      VITALS:  Vital Signs Last 24 Hrs  T(C): 36.2 (11 Jul 2021 16:24), Max: 37.2 (10 Jul 2021 20:34)  T(F): 97.2 (11 Jul 2021 16:24), Max: 98.9 (10 Jul 2021 20:34)  HR: 85 (11 Jul 2021 16:24) (77 - 87)  BP: 133/61 (11 Jul 2021 16:24) (104/50 - 133/61)  BP(mean): --  RR: 18 (11 Jul 2021 16:24) (18 - 19)  SpO2: 94% (11 Jul 2021 16:24) (94% - 99%)    EXAM:  GEN: alert, in no acute distress  CVS: rrr, normal s1/s2  RESP: on 3L O2 via NC, mildly improved b/l rales  ABD: soft, nontender, nondistended, normoactive bowel sounds  EXT: no LE edema  NEURO: aaox3, no focal deficits    LABS:                        13.2   11.50 )-----------( 269      ( 11 Jul 2021 07:24 )             41.6     07-11    133<L>  |  95<L>  |  42<H>  ----------------------------<  500<HH>  4.2   |  29  |  1.72<H>    Ca    8.5      11 Jul 2021 07:24  Phos  2.5     07-11  Mg     2.4     07-11    TPro  7.8  /  Alb  2.5<L>  /  TBili  0.3  /  DBili  x   /  AST  22  /  ALT  25  /  AlkPhos  132<H>  07-11      IMAGING: reviewed

## 2021-07-12 LAB
ALBUMIN SERPL ELPH-MCNC: 2.5 G/DL — LOW (ref 3.5–5)
ALP SERPL-CCNC: 106 U/L — SIGNIFICANT CHANGE UP (ref 40–120)
ALT FLD-CCNC: 26 U/L DA — SIGNIFICANT CHANGE UP (ref 10–60)
ANION GAP SERPL CALC-SCNC: 9 MMOL/L — SIGNIFICANT CHANGE UP (ref 5–17)
APTT BLD: 33.4 SEC — SIGNIFICANT CHANGE UP (ref 27.5–35.5)
AST SERPL-CCNC: 19 U/L — SIGNIFICANT CHANGE UP (ref 10–40)
BILIRUB SERPL-MCNC: 0.3 MG/DL — SIGNIFICANT CHANGE UP (ref 0.2–1.2)
BUN SERPL-MCNC: 36 MG/DL — HIGH (ref 7–18)
CALCIUM SERPL-MCNC: 9 MG/DL — SIGNIFICANT CHANGE UP (ref 8.4–10.5)
CHLORIDE SERPL-SCNC: 99 MMOL/L — SIGNIFICANT CHANGE UP (ref 96–108)
CO2 SERPL-SCNC: 32 MMOL/L — HIGH (ref 22–31)
CREAT SERPL-MCNC: 1.26 MG/DL — SIGNIFICANT CHANGE UP (ref 0.5–1.3)
GLUCOSE BLDC GLUCOMTR-MCNC: 225 MG/DL — HIGH (ref 70–99)
GLUCOSE BLDC GLUCOMTR-MCNC: 285 MG/DL — HIGH (ref 70–99)
GLUCOSE BLDC GLUCOMTR-MCNC: 286 MG/DL — HIGH (ref 70–99)
GLUCOSE BLDC GLUCOMTR-MCNC: 353 MG/DL — HIGH (ref 70–99)
GLUCOSE SERPL-MCNC: 206 MG/DL — HIGH (ref 70–99)
HCT VFR BLD CALC: 43.3 % — SIGNIFICANT CHANGE UP (ref 39–50)
HGB BLD-MCNC: 13.8 G/DL — SIGNIFICANT CHANGE UP (ref 13–17)
INR BLD: 2.03 RATIO — HIGH (ref 0.88–1.16)
MAGNESIUM SERPL-MCNC: 2.5 MG/DL — SIGNIFICANT CHANGE UP (ref 1.6–2.6)
MCHC RBC-ENTMCNC: 29.1 PG — SIGNIFICANT CHANGE UP (ref 27–34)
MCHC RBC-ENTMCNC: 31.9 GM/DL — LOW (ref 32–36)
MCV RBC AUTO: 91.4 FL — SIGNIFICANT CHANGE UP (ref 80–100)
NRBC # BLD: 0 /100 WBCS — SIGNIFICANT CHANGE UP (ref 0–0)
PHOSPHATE SERPL-MCNC: 2.2 MG/DL — LOW (ref 2.5–4.5)
PLATELET # BLD AUTO: 245 K/UL — SIGNIFICANT CHANGE UP (ref 150–400)
POTASSIUM SERPL-MCNC: 3.6 MMOL/L — SIGNIFICANT CHANGE UP (ref 3.5–5.3)
POTASSIUM SERPL-SCNC: 3.6 MMOL/L — SIGNIFICANT CHANGE UP (ref 3.5–5.3)
PROT SERPL-MCNC: 7.3 G/DL — SIGNIFICANT CHANGE UP (ref 6–8.3)
PROTHROM AB SERPL-ACNC: 23.4 SEC — HIGH (ref 10.6–13.6)
RBC # BLD: 4.74 M/UL — SIGNIFICANT CHANGE UP (ref 4.2–5.8)
RBC # FLD: 14 % — SIGNIFICANT CHANGE UP (ref 10.3–14.5)
SODIUM SERPL-SCNC: 140 MMOL/L — SIGNIFICANT CHANGE UP (ref 135–145)
WBC # BLD: 11.27 K/UL — HIGH (ref 3.8–10.5)
WBC # FLD AUTO: 11.27 K/UL — HIGH (ref 3.8–10.5)

## 2021-07-12 PROCEDURE — 99233 SBSQ HOSP IP/OBS HIGH 50: CPT | Mod: GC

## 2021-07-12 PROCEDURE — 76775 US EXAM ABDO BACK WALL LIM: CPT | Mod: 26

## 2021-07-12 RX ORDER — WARFARIN SODIUM 2.5 MG/1
2 TABLET ORAL AT BEDTIME
Refills: 0 | Status: COMPLETED | OUTPATIENT
Start: 2021-07-12 | End: 2021-07-12

## 2021-07-12 RX ORDER — INSULIN GLARGINE 100 [IU]/ML
20 INJECTION, SOLUTION SUBCUTANEOUS AT BEDTIME
Refills: 0 | Status: DISCONTINUED | OUTPATIENT
Start: 2021-07-12 | End: 2021-07-13

## 2021-07-12 RX ORDER — SODIUM,POTASSIUM PHOSPHATES 278-250MG
1 POWDER IN PACKET (EA) ORAL ONCE
Refills: 0 | Status: COMPLETED | OUTPATIENT
Start: 2021-07-12 | End: 2021-07-12

## 2021-07-12 RX ORDER — INSULIN LISPRO 100/ML
11 VIAL (ML) SUBCUTANEOUS
Refills: 0 | Status: DISCONTINUED | OUTPATIENT
Start: 2021-07-12 | End: 2021-07-12

## 2021-07-12 RX ORDER — INSULIN GLARGINE 100 [IU]/ML
18 INJECTION, SOLUTION SUBCUTANEOUS AT BEDTIME
Refills: 0 | Status: DISCONTINUED | OUTPATIENT
Start: 2021-07-12 | End: 2021-07-12

## 2021-07-12 RX ORDER — INSULIN LISPRO 100/ML
15 VIAL (ML) SUBCUTANEOUS
Refills: 0 | Status: DISCONTINUED | OUTPATIENT
Start: 2021-07-12 | End: 2021-07-13

## 2021-07-12 RX ADMIN — SACUBITRIL AND VALSARTAN 1 TABLET(S): 24; 26 TABLET, FILM COATED ORAL at 06:07

## 2021-07-12 RX ADMIN — Medication 25 MILLIGRAM(S): at 06:07

## 2021-07-12 RX ADMIN — ALBUTEROL 2.5 MILLIGRAM(S): 90 AEROSOL, METERED ORAL at 15:51

## 2021-07-12 RX ADMIN — Medication 4: at 08:05

## 2021-07-12 RX ADMIN — WARFARIN SODIUM 2 MILLIGRAM(S): 2.5 TABLET ORAL at 21:16

## 2021-07-12 RX ADMIN — Medication 8 UNIT(S): at 08:06

## 2021-07-12 RX ADMIN — Medication 100 MILLIGRAM(S): at 00:56

## 2021-07-12 RX ADMIN — Medication 6: at 12:16

## 2021-07-12 RX ADMIN — Medication 40 MILLIGRAM(S): at 06:08

## 2021-07-12 RX ADMIN — Medication 40 MILLIGRAM(S): at 06:07

## 2021-07-12 RX ADMIN — PANTOPRAZOLE SODIUM 40 MILLIGRAM(S): 20 TABLET, DELAYED RELEASE ORAL at 06:07

## 2021-07-12 RX ADMIN — ALBUTEROL 2.5 MILLIGRAM(S): 90 AEROSOL, METERED ORAL at 21:12

## 2021-07-12 RX ADMIN — Medication 15 UNIT(S): at 17:06

## 2021-07-12 RX ADMIN — Medication 1 PACKET(S): at 18:19

## 2021-07-12 RX ADMIN — SACUBITRIL AND VALSARTAN 1 TABLET(S): 24; 26 TABLET, FILM COATED ORAL at 17:06

## 2021-07-12 RX ADMIN — INSULIN GLARGINE 20 UNIT(S): 100 INJECTION, SOLUTION SUBCUTANEOUS at 22:35

## 2021-07-12 RX ADMIN — Medication 10: at 17:06

## 2021-07-12 RX ADMIN — ISOSORBIDE MONONITRATE 30 MILLIGRAM(S): 60 TABLET, EXTENDED RELEASE ORAL at 11:42

## 2021-07-12 RX ADMIN — ALBUTEROL 2.5 MILLIGRAM(S): 90 AEROSOL, METERED ORAL at 09:41

## 2021-07-12 RX ADMIN — ATORVASTATIN CALCIUM 80 MILLIGRAM(S): 80 TABLET, FILM COATED ORAL at 21:16

## 2021-07-12 NOTE — PROGRESS NOTE ADULT - PROBLEM SELECTOR PLAN 8
- on Metformin 1000mg bid, glyburide 5mg at home  - a1c 8.8  - c/w sliding scale. increased Lantus 10 to 15 qhs. Increased Lispro from 6 to 8. - on Metformin 1000mg bid, glyburide 5mg at home  - a1c 8.8  - c/w sliding scale. increased Lantus 15 to 18 qhs. Increased Lispro from 8 to 11

## 2021-07-12 NOTE — PROGRESS NOTE ADULT - ATTENDING COMMENTS
A/P# Acute hypoxic respiratory failure# Acute on chronic copd exacerbation #HFr EF- LV Aneurysm# LV thrombus    -still wheeze on exam- cw steroids and duo nebs and supplemental oxygen  -spoke with Dr. Gonzalez- chronic clot- cw coumadin  -increase lantus to 20 U and standing to 11 U as patient uncontrolled due to steroids. Hypoglycemia protocol

## 2021-07-12 NOTE — PROGRESS NOTE ADULT - PROBLEM SELECTOR PLAN 1
- sent from urgent care with SOB and hypoxia since last Friday, likely COPD Vx CHF excerebration  - saturating well on 2L  - CXR infiltrates noted on right side (f/u official read)  - troponin trended down  - BNP trending down  - start on albuterol  - Robitussin PRN  - No consolidation or pulmonary edema.  - Switching to PO steroid  - d/cing ceftriaxone, azithromycin

## 2021-07-12 NOTE — PROGRESS NOTE ADULT - SUBJECTIVE AND OBJECTIVE BOX
PGY-1 Progress Note discussed with attending    PAGER #: [[phone]] TILL 5:00 PM  PLEASE CONTACT ON CALL TEAM:  - On Call Team (Please refer to Deborah) FROM 5:00 PM - 8:30PM  - Nightfloat Team FROM 8:30 -7:30 AM    CHIEF COMPLAINT & BRIEF HOSPITAL COURSE:    INTERVAL HPI/OVERNIGHT EVENTS:   Patient seen and examined at bedside. No acute events overnight. Positive for shortness of breath and throat pain due to coughing. Patient is worried about his sugars.    Replaced phosphorus.    REVIEW OF SYSTEMS:  Negative other than what was mentioned on HPI    Vital Signs Last 24 Hrs  T(C): 36.5 (12 Jul 2021 11:34), Max: 36.8 (12 Jul 2021 04:54)  T(F): 97.7 (12 Jul 2021 11:34), Max: 98.3 (12 Jul 2021 04:54)  HR: 85 (12 Jul 2021 11:34) (66 - 91)  BP: 138/74 (12 Jul 2021 11:34) (113/66 - 138/74)  BP(mean): --  RR: 18 (12 Jul 2021 11:34) (18 - 19)  SpO2: 96% (12 Jul 2021 11:34) (94% - 99%)    PHYSICAL EXAMINATION:  GENERAL: NAD  HEAD:  Atraumatic, Normocephalic  CHEST/LUNG: Heavy bilateral wheezing on auscultation  HEART: Regular rate and rhythm; No murmurs, rubs, or gallops  ABDOMEN: Soft, Nontender, distended; Bowel sounds present  NERVOUS SYSTEM:  Alert & Oriented X3,    EXTREMITIES:  2+ Peripheral Pulses, No clubbing, cyanosis, or edema  SKIN: warm dry. chronic skin changes on bilateral LE's.                          13.8   11.27 )-----------( 245      ( 12 Jul 2021 06:54 )             43.3     07-12    140  |  99  |  36<H>  ----------------------------<  206<H>  3.6   |  32<H>  |  1.26    Ca    9.0      12 Jul 2021 06:54  Phos  2.2     07-12  Mg     2.5     07-12    TPro  7.3  /  Alb  2.5<L>  /  TBili  0.3  /  DBili  x   /  AST  19  /  ALT  26  /  AlkPhos  106  07-12    LIVER FUNCTIONS - ( 12 Jul 2021 06:54 )  Alb: 2.5 g/dL / Pro: 7.3 g/dL / ALK PHOS: 106 U/L / ALT: 26 U/L DA / AST: 19 U/L / GGT: x               PT/INR - ( 12 Jul 2021 06:54 )   PT: 23.4 sec;   INR: 2.03 ratio         PTT - ( 12 Jul 2021 06:54 )  PTT:33.4 sec    CAPILLARY BLOOD GLUCOSE      RADIOLOGY & ADDITIONAL TESTS:

## 2021-07-13 LAB
ANION GAP SERPL CALC-SCNC: 4 MMOL/L — LOW (ref 5–17)
BUN SERPL-MCNC: 29 MG/DL — HIGH (ref 7–18)
CALCIUM SERPL-MCNC: 8.7 MG/DL — SIGNIFICANT CHANGE UP (ref 8.4–10.5)
CHLORIDE SERPL-SCNC: 103 MMOL/L — SIGNIFICANT CHANGE UP (ref 96–108)
CO2 SERPL-SCNC: 35 MMOL/L — HIGH (ref 22–31)
CREAT SERPL-MCNC: 1.18 MG/DL — SIGNIFICANT CHANGE UP (ref 0.5–1.3)
GLUCOSE BLDC GLUCOMTR-MCNC: 236 MG/DL — HIGH (ref 70–99)
GLUCOSE BLDC GLUCOMTR-MCNC: 253 MG/DL — HIGH (ref 70–99)
GLUCOSE BLDC GLUCOMTR-MCNC: 345 MG/DL — HIGH (ref 70–99)
GLUCOSE BLDC GLUCOMTR-MCNC: 422 MG/DL — HIGH (ref 70–99)
GLUCOSE SERPL-MCNC: 207 MG/DL — HIGH (ref 70–99)
HCT VFR BLD CALC: 44.4 % — SIGNIFICANT CHANGE UP (ref 39–50)
HGB BLD-MCNC: 13.7 G/DL — SIGNIFICANT CHANGE UP (ref 13–17)
INR BLD: 1.98 RATIO — HIGH (ref 0.88–1.16)
MAGNESIUM SERPL-MCNC: 2.2 MG/DL — SIGNIFICANT CHANGE UP (ref 1.6–2.6)
MCHC RBC-ENTMCNC: 28.5 PG — SIGNIFICANT CHANGE UP (ref 27–34)
MCHC RBC-ENTMCNC: 30.9 GM/DL — LOW (ref 32–36)
MCV RBC AUTO: 92.5 FL — SIGNIFICANT CHANGE UP (ref 80–100)
NRBC # BLD: 0 /100 WBCS — SIGNIFICANT CHANGE UP (ref 0–0)
PHOSPHATE SERPL-MCNC: 2.4 MG/DL — LOW (ref 2.5–4.5)
PLATELET # BLD AUTO: 245 K/UL — SIGNIFICANT CHANGE UP (ref 150–400)
POTASSIUM SERPL-MCNC: 3.7 MMOL/L — SIGNIFICANT CHANGE UP (ref 3.5–5.3)
POTASSIUM SERPL-SCNC: 3.7 MMOL/L — SIGNIFICANT CHANGE UP (ref 3.5–5.3)
PROTHROM AB SERPL-ACNC: 22.8 SEC — HIGH (ref 10.6–13.6)
RBC # BLD: 4.8 M/UL — SIGNIFICANT CHANGE UP (ref 4.2–5.8)
RBC # FLD: 13.7 % — SIGNIFICANT CHANGE UP (ref 10.3–14.5)
SODIUM SERPL-SCNC: 142 MMOL/L — SIGNIFICANT CHANGE UP (ref 135–145)
WBC # BLD: 10.29 K/UL — SIGNIFICANT CHANGE UP (ref 3.8–10.5)
WBC # FLD AUTO: 10.29 K/UL — SIGNIFICANT CHANGE UP (ref 3.8–10.5)

## 2021-07-13 PROCEDURE — 99233 SBSQ HOSP IP/OBS HIGH 50: CPT | Mod: GC

## 2021-07-13 RX ORDER — INSULIN GLARGINE 100 [IU]/ML
25 INJECTION, SOLUTION SUBCUTANEOUS AT BEDTIME
Refills: 0 | Status: DISCONTINUED | OUTPATIENT
Start: 2021-07-13 | End: 2021-07-15

## 2021-07-13 RX ORDER — ALBUTEROL 90 UG/1
1 AEROSOL, METERED ORAL EVERY 4 HOURS
Refills: 0 | Status: DISCONTINUED | OUTPATIENT
Start: 2021-07-13 | End: 2021-07-15

## 2021-07-13 RX ORDER — INSULIN LISPRO 100/ML
20 VIAL (ML) SUBCUTANEOUS EVERY 6 HOURS
Refills: 0 | Status: DISCONTINUED | OUTPATIENT
Start: 2021-07-13 | End: 2021-07-14

## 2021-07-13 RX ORDER — INSULIN LISPRO 100/ML
20 VIAL (ML) SUBCUTANEOUS
Refills: 0 | Status: DISCONTINUED | OUTPATIENT
Start: 2021-07-13 | End: 2021-07-13

## 2021-07-13 RX ORDER — WARFARIN SODIUM 2.5 MG/1
2.5 TABLET ORAL AT BEDTIME
Refills: 0 | Status: COMPLETED | OUTPATIENT
Start: 2021-07-13 | End: 2021-07-13

## 2021-07-13 RX ORDER — POTASSIUM PHOSPHATE, MONOBASIC POTASSIUM PHOSPHATE, DIBASIC 236; 224 MG/ML; MG/ML
30 INJECTION, SOLUTION INTRAVENOUS ONCE
Refills: 0 | Status: COMPLETED | OUTPATIENT
Start: 2021-07-13 | End: 2021-07-13

## 2021-07-13 RX ORDER — WARFARIN SODIUM 2.5 MG/1
2 TABLET ORAL AT BEDTIME
Refills: 0 | Status: DISCONTINUED | OUTPATIENT
Start: 2021-07-13 | End: 2021-07-13

## 2021-07-13 RX ADMIN — ALBUTEROL 1 PUFF(S): 90 AEROSOL, METERED ORAL at 21:29

## 2021-07-13 RX ADMIN — INSULIN GLARGINE 25 UNIT(S): 100 INJECTION, SOLUTION SUBCUTANEOUS at 21:28

## 2021-07-13 RX ADMIN — Medication 6: at 08:00

## 2021-07-13 RX ADMIN — ALBUTEROL 2.5 MILLIGRAM(S): 90 AEROSOL, METERED ORAL at 08:14

## 2021-07-13 RX ADMIN — ALBUTEROL 2.5 MILLIGRAM(S): 90 AEROSOL, METERED ORAL at 20:13

## 2021-07-13 RX ADMIN — WARFARIN SODIUM 2.5 MILLIGRAM(S): 2.5 TABLET ORAL at 21:04

## 2021-07-13 RX ADMIN — Medication 25 MILLIGRAM(S): at 05:32

## 2021-07-13 RX ADMIN — Medication 4: at 21:23

## 2021-07-13 RX ADMIN — Medication 4: at 17:02

## 2021-07-13 RX ADMIN — SACUBITRIL AND VALSARTAN 1 TABLET(S): 24; 26 TABLET, FILM COATED ORAL at 17:02

## 2021-07-13 RX ADMIN — ATORVASTATIN CALCIUM 80 MILLIGRAM(S): 80 TABLET, FILM COATED ORAL at 21:04

## 2021-07-13 RX ADMIN — POTASSIUM PHOSPHATE, MONOBASIC POTASSIUM PHOSPHATE, DIBASIC 83.33 MILLIMOLE(S): 236; 224 INJECTION, SOLUTION INTRAVENOUS at 10:27

## 2021-07-13 RX ADMIN — ALBUTEROL 2.5 MILLIGRAM(S): 90 AEROSOL, METERED ORAL at 15:45

## 2021-07-13 RX ADMIN — ISOSORBIDE MONONITRATE 30 MILLIGRAM(S): 60 TABLET, EXTENDED RELEASE ORAL at 12:05

## 2021-07-13 RX ADMIN — Medication 20 UNIT(S): at 17:02

## 2021-07-13 RX ADMIN — PANTOPRAZOLE SODIUM 40 MILLIGRAM(S): 20 TABLET, DELAYED RELEASE ORAL at 05:32

## 2021-07-13 RX ADMIN — ALBUTEROL 2.5 MILLIGRAM(S): 90 AEROSOL, METERED ORAL at 04:15

## 2021-07-13 RX ADMIN — SACUBITRIL AND VALSARTAN 1 TABLET(S): 24; 26 TABLET, FILM COATED ORAL at 05:33

## 2021-07-13 RX ADMIN — Medication 12: at 12:05

## 2021-07-13 RX ADMIN — Medication 15 UNIT(S): at 08:00

## 2021-07-13 RX ADMIN — Medication 40 MILLIGRAM(S): at 05:32

## 2021-07-13 RX ADMIN — Medication 15 UNIT(S): at 12:06

## 2021-07-13 NOTE — PROGRESS NOTE ADULT - SUBJECTIVE AND OBJECTIVE BOX
PGY-1 Progress Note discussed with attending    PAGER #: [[phone]] TILL 5:00 PM  PLEASE CONTACT ON CALL TEAM:  - On Call Team (Please refer to Deborah) FROM 5:00 PM - 8:30PM  - Nightfloat Team FROM 8:30 -7:30 AM    CHIEF COMPLAINT & BRIEF HOSPITAL COURSE:    INTERVAL HPI/OVERNIGHT EVENTS:   [ #[id number]] Patient seen and examined at bedside. No acute events overnight. Patient's SOB, throat pain are better. Patient is happy to be fully explained about his steroids and insulin.  Patient's wheezing has decreased.    REVIEW OF SYSTEMS:  Negative other than what was mentioned on HPI    Vital Signs Last 24 Hrs  T(C): 36.8 (13 Jul 2021 11:17), Max: 36.8 (13 Jul 2021 11:17)  T(F): 98.2 (13 Jul 2021 11:17), Max: 98.2 (13 Jul 2021 11:17)  HR: 82 (13 Jul 2021 11:17) (77 - 85)  BP: 118/77 (13 Jul 2021 11:17) (118/77 - 141/82)  BP(mean): --  RR: 18 (13 Jul 2021 11:17) (17 - 18)  SpO2: 98% (13 Jul 2021 11:17) (93% - 99%)    PHYSICAL EXAMINATION:  GENERAL: NAD  HEAD:  Atraumatic, Normocephalic  CHEST/LUNG: bilateral wheezing on auscultation  HEART: Regular rate and rhythm; No murmurs, rubs, or gallops  ABDOMEN: Soft, Nontender, distended; Bowel sounds present  NERVOUS SYSTEM:  Alert & Oriented X3,    EXTREMITIES:  2+ Peripheral Pulses, No clubbing, cyanosis, or edema  SKIN: warm dry. chronic skin changes on bilateral LE's.                          13.7   10.29 )-----------( 245      ( 13 Jul 2021 06:11 )             44.4     07-13    142  |  103  |  29<H>  ----------------------------<  207<H>  3.7   |  35<H>  |  1.18    Ca    8.7      13 Jul 2021 06:11  Phos  2.4     07-13  Mg     2.2     07-13    TPro  7.3  /  Alb  2.5<L>  /  TBili  0.3  /  DBili  x   /  AST  19  /  ALT  26  /  AlkPhos  106  07-12    LIVER FUNCTIONS - ( 12 Jul 2021 06:54 )  Alb: 2.5 g/dL / Pro: 7.3 g/dL / ALK PHOS: 106 U/L / ALT: 26 U/L DA / AST: 19 U/L / GGT: x               PT/INR - ( 13 Jul 2021 06:11 )   PT: 22.8 sec;   INR: 1.98 ratio         PTT - ( 12 Jul 2021 06:54 )  PTT:33.4 sec    CAPILLARY BLOOD GLUCOSE      RADIOLOGY & ADDITIONAL TESTS:

## 2021-07-13 NOTE — PROGRESS NOTE ADULT - PROBLEM SELECTOR PLAN 8
- on Metformin 1000mg bid, glyburide 5mg at home  - a1c 8.8  - c/w sliding scale. Lantus 20 qhs. Lispro 15.

## 2021-07-13 NOTE — PROGRESS NOTE ADULT - ATTENDING COMMENTS
Wheezing improved, wean off oxygen, glucose uncontrolled- increase lantus to 25 Units and standing to20 Units as requiring high amount due to steroids. hypoglycemia protocol.

## 2021-07-13 NOTE — PROGRESS NOTE ADULT - PROBLEM SELECTOR PLAN 1
- sent from urgent care with SOB and hypoxia since last Friday, likely COPD Vx CHF excerebration  - saturating well on 2L  - CXR infiltrates noted on right side (f/u official read)  - troponin trended down  - BNP trending down  - start on albuterol  - Robitussin PRN  - No consolidation or pulmonary edema.  - Switching to PO steroid

## 2021-07-14 LAB
ANION GAP SERPL CALC-SCNC: 11 MMOL/L — SIGNIFICANT CHANGE UP (ref 5–17)
BUN SERPL-MCNC: 27 MG/DL — HIGH (ref 7–18)
CALCIUM SERPL-MCNC: 9 MG/DL — SIGNIFICANT CHANGE UP (ref 8.4–10.5)
CHLORIDE SERPL-SCNC: 105 MMOL/L — SIGNIFICANT CHANGE UP (ref 96–108)
CO2 SERPL-SCNC: 24 MMOL/L — SIGNIFICANT CHANGE UP (ref 22–31)
CREAT SERPL-MCNC: 1.15 MG/DL — SIGNIFICANT CHANGE UP (ref 0.5–1.3)
GLUCOSE BLDC GLUCOMTR-MCNC: 138 MG/DL — HIGH (ref 70–99)
GLUCOSE BLDC GLUCOMTR-MCNC: 197 MG/DL — HIGH (ref 70–99)
GLUCOSE BLDC GLUCOMTR-MCNC: 199 MG/DL — HIGH (ref 70–99)
GLUCOSE BLDC GLUCOMTR-MCNC: 257 MG/DL — HIGH (ref 70–99)
GLUCOSE BLDC GLUCOMTR-MCNC: 272 MG/DL — HIGH (ref 70–99)
GLUCOSE SERPL-MCNC: 202 MG/DL — HIGH (ref 70–99)
HCT VFR BLD CALC: 46 % — SIGNIFICANT CHANGE UP (ref 39–50)
HGB BLD-MCNC: 14.9 G/DL — SIGNIFICANT CHANGE UP (ref 13–17)
INR BLD: 1.77 RATIO — HIGH (ref 0.88–1.16)
MAGNESIUM SERPL-MCNC: 2.1 MG/DL — SIGNIFICANT CHANGE UP (ref 1.6–2.6)
MCHC RBC-ENTMCNC: 29.3 PG — SIGNIFICANT CHANGE UP (ref 27–34)
MCHC RBC-ENTMCNC: 32.4 GM/DL — SIGNIFICANT CHANGE UP (ref 32–36)
MCV RBC AUTO: 90.4 FL — SIGNIFICANT CHANGE UP (ref 80–100)
NRBC # BLD: 0 /100 WBCS — SIGNIFICANT CHANGE UP (ref 0–0)
PHOSPHATE SERPL-MCNC: 2.9 MG/DL — SIGNIFICANT CHANGE UP (ref 2.5–4.5)
PLATELET # BLD AUTO: 287 K/UL — SIGNIFICANT CHANGE UP (ref 150–400)
POTASSIUM SERPL-MCNC: 4.3 MMOL/L — SIGNIFICANT CHANGE UP (ref 3.5–5.3)
POTASSIUM SERPL-SCNC: 4.3 MMOL/L — SIGNIFICANT CHANGE UP (ref 3.5–5.3)
PROTHROM AB SERPL-ACNC: 20.5 SEC — HIGH (ref 10.6–13.6)
RBC # BLD: 5.09 M/UL — SIGNIFICANT CHANGE UP (ref 4.2–5.8)
RBC # FLD: 14 % — SIGNIFICANT CHANGE UP (ref 10.3–14.5)
SODIUM SERPL-SCNC: 140 MMOL/L — SIGNIFICANT CHANGE UP (ref 135–145)
WBC # BLD: 12.38 K/UL — HIGH (ref 3.8–10.5)
WBC # FLD AUTO: 12.38 K/UL — HIGH (ref 3.8–10.5)

## 2021-07-14 PROCEDURE — 99232 SBSQ HOSP IP/OBS MODERATE 35: CPT | Mod: GC

## 2021-07-14 RX ORDER — INSULIN LISPRO 100/ML
20 VIAL (ML) SUBCUTANEOUS
Refills: 0 | Status: DISCONTINUED | OUTPATIENT
Start: 2021-07-14 | End: 2021-07-14

## 2021-07-14 RX ORDER — SACUBITRIL AND VALSARTAN 24; 26 MG/1; MG/1
1 TABLET, FILM COATED ORAL
Qty: 0 | Refills: 0 | DISCHARGE

## 2021-07-14 RX ORDER — INSULIN LISPRO 100/ML
15 VIAL (ML) SUBCUTANEOUS
Refills: 0 | Status: DISCONTINUED | OUTPATIENT
Start: 2021-07-14 | End: 2021-07-15

## 2021-07-14 RX ORDER — ISOSORBIDE MONONITRATE 60 MG/1
1 TABLET, EXTENDED RELEASE ORAL
Qty: 0 | Refills: 0 | DISCHARGE

## 2021-07-14 RX ORDER — IPRATROPIUM/ALBUTEROL SULFATE 18-103MCG
3 AEROSOL WITH ADAPTER (GRAM) INHALATION ONCE
Refills: 0 | Status: COMPLETED | OUTPATIENT
Start: 2021-07-14 | End: 2021-07-14

## 2021-07-14 RX ORDER — WARFARIN SODIUM 2.5 MG/1
3 TABLET ORAL ONCE
Refills: 0 | Status: COMPLETED | OUTPATIENT
Start: 2021-07-14 | End: 2021-07-14

## 2021-07-14 RX ORDER — WARFARIN SODIUM 2.5 MG/1
4 TABLET ORAL ONCE
Refills: 0 | Status: DISCONTINUED | OUTPATIENT
Start: 2021-07-14 | End: 2021-07-14

## 2021-07-14 RX ADMIN — WARFARIN SODIUM 3 MILLIGRAM(S): 2.5 TABLET ORAL at 21:15

## 2021-07-14 RX ADMIN — Medication 2: at 07:44

## 2021-07-14 RX ADMIN — Medication 15 UNIT(S): at 17:10

## 2021-07-14 RX ADMIN — Medication 25 MILLIGRAM(S): at 05:35

## 2021-07-14 RX ADMIN — INSULIN GLARGINE 25 UNIT(S): 100 INJECTION, SOLUTION SUBCUTANEOUS at 21:14

## 2021-07-14 RX ADMIN — ALBUTEROL 1 PUFF(S): 90 AEROSOL, METERED ORAL at 21:15

## 2021-07-14 RX ADMIN — Medication 20 UNIT(S): at 07:44

## 2021-07-14 RX ADMIN — ALBUTEROL 1 PUFF(S): 90 AEROSOL, METERED ORAL at 14:00

## 2021-07-14 RX ADMIN — Medication 15 UNIT(S): at 12:19

## 2021-07-14 RX ADMIN — SACUBITRIL AND VALSARTAN 1 TABLET(S): 24; 26 TABLET, FILM COATED ORAL at 17:14

## 2021-07-14 RX ADMIN — Medication 6: at 12:18

## 2021-07-14 RX ADMIN — ISOSORBIDE MONONITRATE 30 MILLIGRAM(S): 60 TABLET, EXTENDED RELEASE ORAL at 12:19

## 2021-07-14 RX ADMIN — ATORVASTATIN CALCIUM 80 MILLIGRAM(S): 80 TABLET, FILM COATED ORAL at 21:14

## 2021-07-14 RX ADMIN — Medication 40 MILLIGRAM(S): at 05:34

## 2021-07-14 RX ADMIN — ALBUTEROL 1 PUFF(S): 90 AEROSOL, METERED ORAL at 11:00

## 2021-07-14 RX ADMIN — PANTOPRAZOLE SODIUM 40 MILLIGRAM(S): 20 TABLET, DELAYED RELEASE ORAL at 05:34

## 2021-07-14 RX ADMIN — ALBUTEROL 1 PUFF(S): 90 AEROSOL, METERED ORAL at 17:13

## 2021-07-14 RX ADMIN — ALBUTEROL 1 PUFF(S): 90 AEROSOL, METERED ORAL at 05:36

## 2021-07-14 RX ADMIN — Medication 2: at 17:10

## 2021-07-14 RX ADMIN — Medication 40 MILLIGRAM(S): at 05:33

## 2021-07-14 RX ADMIN — SACUBITRIL AND VALSARTAN 1 TABLET(S): 24; 26 TABLET, FILM COATED ORAL at 05:34

## 2021-07-14 NOTE — PROGRESS NOTE ADULT - PROBLEM SELECTOR PLAN 1
- sent from urgent care with SOB and hypoxia since last Friday, likely COPD Vx CHF excerebration  - saturating well on 2L  - CXR infiltrates noted on right side (f/u official read)  - troponin trended down  - BNP trending down  - start on albuterol  - Robitussin PRN  - No consolidation or pulmonary edema.  - Switching to PO steroid - sent from urgent care with SOB and hypoxia since last Friday, likely COPD Vx CHF excerebration  - saturating well on 2L  - CXR infiltrates noted on right side (f/u official read)  - troponin trended down  - BNP trending down  - start on albuterol  - Robitussin PRN  - No consolidation or pulmonary edema.  - Switching to PO steroid    -saturating 97% on room air, 96% upon walking without nasal canula.

## 2021-07-14 NOTE — PROGRESS NOTE ADULT - PROBLEM SELECTOR PLAN 8
- on Metformin 1000mg bid, glyburide 5mg at home  - a1c 8.8  - c/w sliding scale. Lantus 25 qhs. Lispro 20 -> 15.

## 2021-07-14 NOTE — CHART NOTE - NSCHARTNOTEFT_GEN_A_CORE
Patient had an order for lispro 20u Q6  received lantus 25, ISS glucose was 272  Patient is on a diet, so lispro was changed to 3 times a day  Now patient glucose 199, pre lispro and post steroid.
We have change
Patient was seen by me. Discussed with the attending.  Ordering CT chest w/o contrast,  discontinuing ACEI and resuming Lasix.  Consulted Cardio  Ordered Virus panel  Ordered Lipase and D-Dimer

## 2021-07-14 NOTE — PROGRESS NOTE ADULT - SUBJECTIVE AND OBJECTIVE BOX
PGY-1 Progress Note discussed with attending    PAGER #: [[phone]] TILL 5:00 PM  PLEASE CONTACT ON CALL TEAM:  - On Call Team (Please refer to Deborah) FROM 5:00 PM - 8:30PM  - Nightfloat Team FROM 8:30 -7:30 AM    CHIEF COMPLAINT & BRIEF HOSPITAL COURSE:    INTERVAL HPI/OVERNIGHT EVENTS:   Patient seen and examined at bedside. No acute events overnight. Patient feels better in terms of SOB. He is off of nasal canula and is on room air saturating 97%.    REVIEW OF SYSTEMS:  Negative other than what was mentioned on HPI    Vital Signs Last 24 Hrs  T(C): 36.5 (14 Jul 2021 07:53), Max: 36.8 (13 Jul 2021 11:17)  T(F): 97.7 (14 Jul 2021 07:53), Max: 98.2 (13 Jul 2021 11:17)  HR: 77 (14 Jul 2021 07:53) (71 - 94)  BP: 160/70 (14 Jul 2021 07:53) (98/67 - 160/70)  BP(mean): --  RR: 19 (14 Jul 2021 07:53) (18 - 20)  SpO2: 97% (14 Jul 2021 07:53) (96% - 99%)    PHYSICAL EXAMINATION:  GENERAL: NAD  HEAD:  Atraumatic, Normocephalic  CHEST/LUNG: bilateral mild wheezing on auscultation  HEART: Regular rate and rhythm; No murmurs, rubs, or gallops  ABDOMEN: Soft, Nontender, distended; Bowel sounds present  NERVOUS SYSTEM:  Alert & Oriented X3,    EXTREMITIES:  2+ Peripheral Pulses, No clubbing, cyanosis, or edema  SKIN: warm dry. chronic skin changes on bilateral LE's.                          14.9   12.38 )-----------( 287      ( 14 Jul 2021 06:34 )             46.0     07-14    140  |  105  |  27<H>  ----------------------------<  202<H>  4.3   |  24  |  1.15    Ca    9.0      14 Jul 2021 06:34  Phos  2.9     07-14  Mg     2.1     07-14            PT/INR - ( 14 Jul 2021 06:34 )   PT: 20.5 sec;   INR: 1.77 ratio             CAPILLARY BLOOD GLUCOSE      RADIOLOGY & ADDITIONAL TESTS:           PGY-1 Progress Note discussed with attending    PAGER #: [[phone]] TILL 5:00 PM  PLEASE CONTACT ON CALL TEAM:  - On Call Team (Please refer to Deborah) FROM 5:00 PM - 8:30PM  - Nightfloat Team FROM 8:30 -7:30 AM    CHIEF COMPLAINT & BRIEF HOSPITAL COURSE:    INTERVAL HPI/OVERNIGHT EVENTS:   Patient seen and examined at bedside. No acute events overnight. Patient feels better in terms of SOB. He is off of nasal canula and is on room air saturating 97%.  Patient stated that he walked with PCA without nasal canula and his O2 sat was 96%.    REVIEW OF SYSTEMS:  Negative other than what was mentioned on HPI    Vital Signs Last 24 Hrs  T(C): 36.5 (14 Jul 2021 07:53), Max: 36.8 (13 Jul 2021 11:17)  T(F): 97.7 (14 Jul 2021 07:53), Max: 98.2 (13 Jul 2021 11:17)  HR: 77 (14 Jul 2021 07:53) (71 - 94)  BP: 160/70 (14 Jul 2021 07:53) (98/67 - 160/70)  BP(mean): --  RR: 19 (14 Jul 2021 07:53) (18 - 20)  SpO2: 97% (14 Jul 2021 07:53) (96% - 99%)    PHYSICAL EXAMINATION:  GENERAL: NAD  HEAD:  Atraumatic, Normocephalic  CHEST/LUNG: bilateral mild wheezing on auscultation  HEART: Regular rate and rhythm; No murmurs, rubs, or gallops  ABDOMEN: Soft, Nontender, distended; Bowel sounds present  NERVOUS SYSTEM:  Alert & Oriented X3,    EXTREMITIES:  2+ Peripheral Pulses, No clubbing, cyanosis, or edema  SKIN: warm dry. chronic skin changes on bilateral LE's.                          14.9   12.38 )-----------( 287      ( 14 Jul 2021 06:34 )             46.0     07-14    140  |  105  |  27<H>  ----------------------------<  202<H>  4.3   |  24  |  1.15    Ca    9.0      14 Jul 2021 06:34  Phos  2.9     07-14  Mg     2.1     07-14            PT/INR - ( 14 Jul 2021 06:34 )   PT: 20.5 sec;   INR: 1.77 ratio             CAPILLARY BLOOD GLUCOSE      RADIOLOGY & ADDITIONAL TESTS:

## 2021-07-14 NOTE — DIETITIAN INITIAL EVALUATION ADULT. - PERTINENT LABORATORY DATA
07-14 Na140 mmol/L Glu 202 mg/dL<H> K+ 4.3 mmol/L Cr  1.15 mg/dL BUN 27 mg/dL<H>   07-14 Phos 2.9 mg/dL   07-12 Alb 2.5 g/dL<L>       07-09 Chol 93 mg/dL LDL --    HDL 30 mg/dL<L> Trig 93 mg/dL  07-09-21 @ 09:43 HgbA1C 8.8 [4.0 - 5.6]

## 2021-07-14 NOTE — DIETITIAN INITIAL EVALUATION ADULT. - PERTINENT MEDS FT
MEDICATIONS  (STANDING):  ALBUTerol    90 MICROgram(s) HFA Inhaler 1 Puff(s) Inhalation every 4 hours  atorvastatin 80 milliGRAM(s) Oral at bedtime  furosemide    Tablet 40 milliGRAM(s) Oral daily  glucagon  Injectable 1 milliGRAM(s) IntraMuscular once  insulin glargine Injectable (LANTUS) 25 Unit(s) SubCutaneous at bedtime  insulin lispro (ADMELOG) corrective regimen sliding scale   SubCutaneous three times a day before meals  insulin lispro (ADMELOG) corrective regimen sliding scale   SubCutaneous at bedtime  insulin lispro Injectable (ADMELOG) 15 Unit(s) SubCutaneous three times a day before meals  isosorbide   mononitrate ER Tablet (IMDUR) 30 milliGRAM(s) Oral daily  metoprolol succinate ER 25 milliGRAM(s) Oral daily  pantoprazole    Tablet 40 milliGRAM(s) Oral before breakfast  predniSONE   Tablet 40 milliGRAM(s) Oral daily  sacubitril 24 mG/valsartan 26 mG 1 Tablet(s) Oral two times a day  warfarin 3 milliGRAM(s) Oral once

## 2021-07-14 NOTE — DIETITIAN INITIAL EVALUATION ADULT. - OTHER INFO
Pt lives home with family PTA, alert, oriented, Croatian speaking, attempt made to contact [employer] service, but pt prefers to call his daughter using his cell phone in speaker mode (Humaira Morel at [phone]) for contact/ for him; reported decreased appetite x 1wk with nausea at times PTA, wt loss of 10 lb x 1m, usual ce=762 lb, but unable to confirm wt changes, current to=568 lb 7/8/2021; 223.7 lb 7/10/2021; 218.2 lb 7/13/2021, changes may due to fluid/scale variance, diuretic Rx; po intake improved in-house, denied GI distress, chewing or swallowing problem at present, simple food choice obtained and forwarded to Dietary; h/o DM x >30y, OtrH5E=8.8, Finger stick huxbx=600 to 515, on Steroid Rx noted, pt/family receptive to written copy of nutrition information, RD business card given for contact as needed

## 2021-07-14 NOTE — DIETITIAN INITIAL EVALUATION ADULT. - LITERATURE/VIDEOS GIVEN
Healthy Plate from [place] [employer];   Consistent CHO Heart Healthy Nutrition Therapy from Nutrition Care Manual

## 2021-07-15 ENCOUNTER — TRANSCRIPTION ENCOUNTER (OUTPATIENT)
Age: 66
End: 2021-07-15

## 2021-07-15 VITALS
HEART RATE: 77 BPM | DIASTOLIC BLOOD PRESSURE: 79 MMHG | TEMPERATURE: 98 F | OXYGEN SATURATION: 95 % | SYSTOLIC BLOOD PRESSURE: 125 MMHG | RESPIRATION RATE: 18 BRPM

## 2021-07-15 LAB
CULTURE RESULTS: SIGNIFICANT CHANGE UP
CULTURE RESULTS: SIGNIFICANT CHANGE UP
GLUCOSE BLDC GLUCOMTR-MCNC: 213 MG/DL — HIGH (ref 70–99)
GLUCOSE BLDC GLUCOMTR-MCNC: 277 MG/DL — HIGH (ref 70–99)
INR BLD: 1.68 RATIO — HIGH (ref 0.88–1.16)
PROTHROM AB SERPL-ACNC: 19.5 SEC — HIGH (ref 10.6–13.6)
SPECIMEN SOURCE: SIGNIFICANT CHANGE UP
SPECIMEN SOURCE: SIGNIFICANT CHANGE UP

## 2021-07-15 PROCEDURE — 87040 BLOOD CULTURE FOR BACTERIA: CPT

## 2021-07-15 PROCEDURE — 86769 SARS-COV-2 COVID-19 ANTIBODY: CPT

## 2021-07-15 PROCEDURE — 96374 THER/PROPH/DIAG INJ IV PUSH: CPT

## 2021-07-15 PROCEDURE — 82570 ASSAY OF URINE CREATININE: CPT

## 2021-07-15 PROCEDURE — 93005 ELECTROCARDIOGRAM TRACING: CPT

## 2021-07-15 PROCEDURE — 85027 COMPLETE CBC AUTOMATED: CPT

## 2021-07-15 PROCEDURE — 71250 CT THORAX DX C-: CPT

## 2021-07-15 PROCEDURE — 36415 COLL VENOUS BLD VENIPUNCTURE: CPT

## 2021-07-15 PROCEDURE — 84443 ASSAY THYROID STIM HORMONE: CPT

## 2021-07-15 PROCEDURE — 83690 ASSAY OF LIPASE: CPT

## 2021-07-15 PROCEDURE — 84560 ASSAY OF URINE/URIC ACID: CPT

## 2021-07-15 PROCEDURE — 87635 SARS-COV-2 COVID-19 AMP PRB: CPT

## 2021-07-15 PROCEDURE — 80048 BASIC METABOLIC PNL TOTAL CA: CPT

## 2021-07-15 PROCEDURE — 80053 COMPREHEN METABOLIC PANEL: CPT

## 2021-07-15 PROCEDURE — 82803 BLOOD GASES ANY COMBINATION: CPT

## 2021-07-15 PROCEDURE — 85610 PROTHROMBIN TIME: CPT

## 2021-07-15 PROCEDURE — 85379 FIBRIN DEGRADATION QUANT: CPT

## 2021-07-15 PROCEDURE — 80074 ACUTE HEPATITIS PANEL: CPT

## 2021-07-15 PROCEDURE — 85025 COMPLETE CBC W/AUTO DIFF WBC: CPT

## 2021-07-15 PROCEDURE — 71045 X-RAY EXAM CHEST 1 VIEW: CPT

## 2021-07-15 PROCEDURE — 83036 HEMOGLOBIN GLYCOSYLATED A1C: CPT

## 2021-07-15 PROCEDURE — 84100 ASSAY OF PHOSPHORUS: CPT

## 2021-07-15 PROCEDURE — 99239 HOSP IP/OBS DSCHRG MGMT >30: CPT | Mod: GC

## 2021-07-15 PROCEDURE — 83935 ASSAY OF URINE OSMOLALITY: CPT

## 2021-07-15 PROCEDURE — 99285 EMERGENCY DEPT VISIT HI MDM: CPT

## 2021-07-15 PROCEDURE — 83880 ASSAY OF NATRIURETIC PEPTIDE: CPT

## 2021-07-15 PROCEDURE — 96375 TX/PRO/DX INJ NEW DRUG ADDON: CPT

## 2021-07-15 PROCEDURE — 93306 TTE W/DOPPLER COMPLETE: CPT

## 2021-07-15 PROCEDURE — 85730 THROMBOPLASTIN TIME PARTIAL: CPT

## 2021-07-15 PROCEDURE — 84300 ASSAY OF URINE SODIUM: CPT

## 2021-07-15 PROCEDURE — 94640 AIRWAY INHALATION TREATMENT: CPT

## 2021-07-15 PROCEDURE — 82962 GLUCOSE BLOOD TEST: CPT

## 2021-07-15 PROCEDURE — 80061 LIPID PANEL: CPT

## 2021-07-15 PROCEDURE — 76775 US EXAM ABDO BACK WALL LIM: CPT

## 2021-07-15 PROCEDURE — 83735 ASSAY OF MAGNESIUM: CPT

## 2021-07-15 PROCEDURE — 84484 ASSAY OF TROPONIN QUANT: CPT

## 2021-07-15 PROCEDURE — 0225U NFCT DS DNA&RNA 21 SARSCOV2: CPT

## 2021-07-15 RX ORDER — ISOSORBIDE MONONITRATE 60 MG/1
1 TABLET, EXTENDED RELEASE ORAL
Qty: 0 | Refills: 0 | DISCHARGE

## 2021-07-15 RX ORDER — BENZOCAINE AND MENTHOL 5; 1 G/100ML; G/100ML
1 LIQUID ORAL
Qty: 10 | Refills: 0
Start: 2021-07-15 | End: 2021-07-24

## 2021-07-15 RX ORDER — SACUBITRIL AND VALSARTAN 24; 26 MG/1; MG/1
1 TABLET, FILM COATED ORAL
Qty: 60 | Refills: 0
Start: 2021-07-15 | End: 2021-08-13

## 2021-07-15 RX ORDER — ISOSORBIDE MONONITRATE 60 MG/1
1 TABLET, EXTENDED RELEASE ORAL
Qty: 0 | Refills: 0 | DISCHARGE
Start: 2021-07-15

## 2021-07-15 RX ORDER — ALBUTEROL 90 UG/1
1 AEROSOL, METERED ORAL
Qty: 1 | Refills: 0
Start: 2021-07-15 | End: 2021-08-13

## 2021-07-15 RX ADMIN — PANTOPRAZOLE SODIUM 40 MILLIGRAM(S): 20 TABLET, DELAYED RELEASE ORAL at 05:13

## 2021-07-15 RX ADMIN — Medication 4: at 08:19

## 2021-07-15 RX ADMIN — Medication 15 UNIT(S): at 08:19

## 2021-07-15 RX ADMIN — ALBUTEROL 1 PUFF(S): 90 AEROSOL, METERED ORAL at 02:11

## 2021-07-15 RX ADMIN — Medication 15 UNIT(S): at 11:39

## 2021-07-15 RX ADMIN — Medication 40 MILLIGRAM(S): at 05:12

## 2021-07-15 RX ADMIN — ALBUTEROL 1 PUFF(S): 90 AEROSOL, METERED ORAL at 05:13

## 2021-07-15 RX ADMIN — ISOSORBIDE MONONITRATE 30 MILLIGRAM(S): 60 TABLET, EXTENDED RELEASE ORAL at 11:38

## 2021-07-15 RX ADMIN — Medication 6: at 11:39

## 2021-07-15 RX ADMIN — SACUBITRIL AND VALSARTAN 1 TABLET(S): 24; 26 TABLET, FILM COATED ORAL at 05:12

## 2021-07-15 RX ADMIN — Medication 25 MILLIGRAM(S): at 05:12

## 2021-07-15 RX ADMIN — ALBUTEROL 1 PUFF(S): 90 AEROSOL, METERED ORAL at 11:40

## 2021-07-15 NOTE — DISCHARGE NOTE NURSING/CASE MANAGEMENT/SOCIAL WORK - PATIENT PORTAL LINK FT
You can access the FollowMyHealth Patient Portal offered by [facility] by registering at the following website: [email or web address]. By joining [facility]’s FollowMyHealth portal, you will also be able to view your health information using other applications (apps) compatible with our system.

## 2021-07-15 NOTE — DISCHARGE NOTE PROVIDER - NSDCMRMEDTOKEN_GEN_ALL_CORE_FT
aspirin 81 mg oral tablet: 1 tab(s) orally once a day  Coumadin 4 mg oral tablet: 1 tab(s) orally once a day  Farxiga 5 mg oral tablet: 1 tab(s) orally once a day  glyBURIDE 5 mg oral tablet: 1 tab(s) orally 2 times a day  isosorbide mononitrate 30 mg oral tablet, extended release: 1 tab(s) orally once a day  Lasix 40 mg oral tablet: 1 tab(s) orally 2 times a day  metFORMIN 1000 mg oral tablet: 1 tab(s) orally 2 times a day  metoprolol succinate 25 mg oral tablet, extended release: 1 tab(s) orally once a day  pantoprazole 40 mg oral delayed release tablet: 1 tab(s) orally once a day  rosuvastatin 40 mg oral tablet: 1 tab(s) orally once a day   albuterol 90 mcg/inh inhalation aerosol: 1 puff(s) inhaled every 4 hours  aspirin 81 mg oral tablet: 1 tab(s) orally once a day  benzonatate 100 mg oral capsule: 1 cap(s) orally 3 times a day, As needed, Cough  Coumadin 4 mg oral tablet: 1 tab(s) orally once a day  Farxiga 5 mg oral tablet: 1 tab(s) orally once a day  glyBURIDE 5 mg oral tablet: 1 tab(s) orally 2 times a day  guaiFENesin 100 mg/5 mL oral liquid: 10 milliliter(s) orally every 6 hours, As Needed -Cough   isosorbide mononitrate 30 mg oral tablet, extended release: 1 tab(s) orally once a day  Lasix 40 mg oral tablet: 1 tab(s) orally 2 times a day  menthol-benzocaine 3.6 mg-15 mg mucous membrane lozenge: 1 tab(s) mucous membrane once a day, As Needed for sore throat  metFORMIN 1000 mg oral tablet: 1 tab(s) orally 2 times a day  metoprolol succinate 25 mg oral tablet, extended release: 1 tab(s) orally once a day  pantoprazole 40 mg oral delayed release tablet: 1 tab(s) orally once a day  predniSONE 10 mg oral tablet: 2 tab(s) orally once a day x 2 days  1 tab(s) orally once a day x 2 days  rosuvastatin 40 mg oral tablet: 1 tab(s) orally once a day  sacubitril-valsartan 24 mg-26 mg oral tablet: 1 tab(s) orally 2 times a day

## 2021-07-15 NOTE — PROGRESS NOTE ADULT - PROBLEM SELECTOR PLAN 7
- on Rosuvastatin 40mg qHs at home; ASA recently d/c'd  - c/w atorvastatin 80mg qHs  - controlled (7/9)
- on Rosuvastatin 40mg qHs at home; ASA recently d/c'd  - c/w atorvastatin 80mg qHs  - f/u lipid panel
- on Rosuvastatin 40mg qHs at home; ASA recently d/c'd  - c/w atorvastatin 80mg qHs  - controlled (7/9)

## 2021-07-15 NOTE — PROGRESS NOTE ADULT - PROBLEM SELECTOR PLAN 1
- sent from urgent care with SOB and hypoxia since last Friday, likely COPD Vx CHF excerebration  - saturating well on 2L  - CXR infiltrates noted on right side (f/u official read)  - troponin trended down  - BNP trending down  - start on albuterol  - Robitussin PRN  - No consolidation or pulmonary edema.  - On PO steroid  - Pt received duoneb nebulizer yesterday at lunch.    -saturating 97% on room air, 96% upon walking without nasal canula.

## 2021-07-15 NOTE — PROGRESS NOTE ADULT - PROBLEM SELECTOR PLAN 3
- CHF excerebration, on Entresto, furosemide 40mg bid  - c/w home meds  - Echo = SEvere segmental left ventricular systolic dysfunction.There is a 8x7 cm baseal  inferior wall aneurysmal and akinetic with 4.7x 3.7 cm thrombus. The remainder of the LV is hypokinetic, EF30-35%.  - Cardiology Dr. Gonzalez on board.
- CHF excerebration, on Entresto, furosemide 40mg bid  - c/w home meds  - Echo = Severe segmental left ventricular systolic dysfunction. There is a 8x7 cm baseal  inferior wall aneurysmal and akinetic with 4.7x 3.7 cm thrombus. The remainder of the LV is hypokinetic, EF30-35%.  - Cardiology Dr. Gonzalez on board.
- CHF excerebration, on Entresto, furosemide 40mg bid  - c/w home meds  - f/u echo
- CHF excerebration, on Entresto, furosemide 40mg bid  - c/w home meds  - Echo = Severe segmental left ventricular systolic dysfunction. There is a 8x7 cm baseal  inferior wall aneurysmal and akinetic with 4.7x 3.7 cm thrombus. The remainder of the LV is hypokinetic, EF30-35%.  - Cardiology Dr. Gonzalez on board.

## 2021-07-15 NOTE — DISCHARGE NOTE NURSING/CASE MANAGEMENT/SOCIAL WORK - NSDCVIVACCINE_GEN_ALL_CORE_FT
influenza, injectable, quadrivalent, preservative free; 01-Oct-2017 13:43; Kristie Haider (RN); [employer]; [id number]; IntraMuscular; Deltoid Left.; 0.5 milliLiter(s); VIS (VIS Published: 07-Aug-2015, VIS Presented: 01-Oct-2017);

## 2021-07-15 NOTE — PROGRESS NOTE ADULT - PROBLEM SELECTOR PLAN 5
- rate controlled, on warfarin 4mg at home; not on rate-control meds  - EKG afib, RVR  - INR 1.98 today. Putting him on 2.5mg warfarin tonight.  - trend INR  - Rate controlled.  - D/c tele today
- rate controlled, on warfarin 4mg at home; not on rate-control meds  - EKG afib, RVR  - coumadin held INR 3.6 today  - trend INR
- rate controlled, on warfarin 4mg at home; not on rate-control meds  - EKG afib, RVR  - INR 1.77 today. Putting him on 3mg warfarin tonight.  - trend INR  - Rate controlled.  - D/c tele today
- rate controlled, on warfarin 4mg at home; not on rate-control meds  - EKG afib, RVR  - INR 1.68 today. Putting him on 3.5mg warfarin tonight.  - trend INR  - Rate controlled.
- rate controlled, on warfarin 4mg at home; not on rate-control meds  - EKG afib, RVR  - coumadin held INR 3.6 today  - trend INR
- rate controlled, on warfarin 4mg at home; not on rate-control meds  - EKG afib, RVR  - INR 2.0 today. Putting him on 2mg warfarin.  - trend INR  - Rate controlled. D/c tele today

## 2021-07-15 NOTE — DISCHARGE NOTE PROVIDER - CARE PROVIDER_API CALL
Teto Gonzalez)  Cardiology  [address] [place] [address]  Phone: [phone]  Fax: [phone]  Follow Up Time: 1 week   Teto Gonzalez)  Cardiology  [address] [place] [address]  Phone: [phone]  Fax: [phone]  Follow Up Time: 1 week    Mariana Barajas)  Critical Care Medicine; Pulmonary [place] [address]  Phone: [phone]  Fax: [phone]  Follow Up Time: 1 week   Teto Gonzalez)  Cardiology  [address] [place] [address]  Phone: [phone]  Fax: [phone]  Follow Up Time: 1 week    Mariana Barajas)  Critical Care Medicine; Pulmonary [place] [address]  Phone: [phone]  Fax: [phone]  Scheduled Appointment: 07/19/2021 03:00 PM

## 2021-07-15 NOTE — PROGRESS NOTE ADULT - SUBJECTIVE AND OBJECTIVE BOX
PGY-1 Progress Note discussed with attending    PAGER #: [[phone]] TILL 5:00 PM  PLEASE CONTACT ON CALL TEAM:  - On Call Team (Please refer to Deborah) FROM 5:00 PM - 8:30PM  - Nightfloat Team FROM 8:30 -7:30 AM    CHIEF COMPLAINT & BRIEF HOSPITAL COURSE:    INTERVAL HPI/OVERNIGHT EVENTS:   Patient seen and examined at bedside. No acute events overnight. Patient wanted his daughter to translate for him. Patient feels better in terms of SOB. He is saturating 97% on room air.  Pt received duoneb nebulizer yesterday at lunch.    REVIEW OF SYSTEMS:  Negative other than what was mentioned on HPI    Vital Signs Last 24 Hrs  T(C): 36.7 (15 Jul 2021 07:30), Max: 36.9 (14 Jul 2021 21:12)  T(F): 98.1 (15 Jul 2021 07:30), Max: 98.5 (14 Jul 2021 21:12)  HR: 81 (15 Jul 2021 07:30) (71 - 83)  BP: 120/72 (15 Jul 2021 07:30) (120/72 - 141/85)  BP(mean): --  RR: 20 (15 Jul 2021 07:30) (17 - 20)  SpO2: 96% (15 Jul 2021 07:30) (95% - 97%)    PHYSICAL EXAMINATION:  GENERAL: NAD  HEAD:  Atraumatic, Normocephalic  CHEST/LUNG: Rapid breath sounds but no wheezing on auscultation  HEART: Regular rate and rhythm; No murmurs, rubs, or gallops  ABDOMEN: Soft, Nontender, distended; Bowel sounds present  NERVOUS SYSTEM:  Alert & Oriented X3,    EXTREMITIES:  2+ Peripheral Pulses, No clubbing, cyanosis, or edema  SKIN: warm dry. chronic skin changes on bilateral LE's.                          14.9   12.38 )-----------( 287      ( 14 Jul 2021 06:34 )             46.0     07-14    140  |  105  |  27<H>  ----------------------------<  202<H>  4.3   |  24  |  1.15    Ca    9.0      14 Jul 2021 06:34  Phos  2.9     07-14  Mg     2.1     07-14            PT/INR - ( 15 Jul 2021 06:58 )   PT: 19.5 sec;   INR: 1.68 ratio             CAPILLARY BLOOD GLUCOSE      RADIOLOGY & ADDITIONAL TESTS:

## 2021-07-15 NOTE — PROGRESS NOTE ADULT - PROBLEM SELECTOR PLAN 4
- noted to have Cr 1.80; last Cr 1.23 (Jul 2021)  - likely 2/2 volume depletion  - avoid nephro toxins  - Cr trending down to 1.54 (baseline 1.1)  - was dehydrated  - f/u renal US
- noted to have Cr 1.80; last Cr 1.23 (Jul 2021)  - likely 2/2 volume depletion  - avoid nephro toxins  - Cr trending down to 1.26 (baseline 1.1)  - f/u renal US
- noted to have Cr 1.80; last Cr 1.23 (Jul 2021)  - likely 2/2 volume depletion  - avoid nephro toxins  - Cr trending down to 1.26 (baseline 1.1)  - renal US shows parenchymal disease
- noted to have Cr 1.80; last Cr 1.23 (Jul 2021)  - likely 2/2 volume depletion  - avoid nephro toxins  - Trend Cr  - f/u FeNa
- noted to have Cr 1.80; last Cr 1.23 (Jul 2021)  - likely 2/2 volume depletion  - avoid nephro toxins  - Cr trending down to 1.26 (baseline 1.1)  - f/u renal US
- noted to have Cr 1.80; last Cr 1.23 (Jul 2021)  - likely 2/2 volume depletion  - avoid nephro toxins  - Cr trending down to 1.26 (baseline 1.1)  - f/u renal US

## 2021-07-15 NOTE — DISCHARGE NOTE PROVIDER - HOSPITAL COURSE
Patient is a 66 year old male, w/ PMH w/ CHF, afib, HTN, HLD, and DM, sent by urgent care due to hypoxia. He reports difficult breathing since last Friday. He reports no inciting factor, including recent traveling. He endorses unable to lay flat on surface, diaphoresis, recent wt loss of 10lb in a month, and generalized weakness. He denies fever, chills, n/v, chest pain, abdominal pain, urinary or bowel movement changes.  Pt was admitted for COPD exacerbation and subtherapeutic INR.    Pt was started on ceftriaxone and azithromycin, which is DCd as pt has no signs of PNA.  Started on steroid IV, which is tapered and started on prednisone 40mg daily.   We adjusted coumadin dosage and INR went down.   Blood sugar was running high during hospital stay likely due to steroid use and insulin was increased to Lantus 25u and admelog 15u.  Pt is now on RA saturating well.   The patient is medically stable and ready for discharge. Case discussed with attending doctor.

## 2021-07-15 NOTE — PROGRESS NOTE ADULT - PROBLEM SELECTOR PLAN 6
- controlled, on Imdur 60mg only at home and sacubitril  24 mG/valsartan  - c/w home medication  - monitor BP

## 2021-07-15 NOTE — PROGRESS NOTE ADULT - PROBLEM SELECTOR PROBLEM 4
EUGENIO (acute kidney injury)

## 2021-07-15 NOTE — DISCHARGE NOTE PROVIDER - PROVIDER TOKENS
PROVIDER:[TOKEN:[[id number]],FOLLOWUP:[1 week]] PROVIDER:[TOKEN:[[id number]],FOLLOWUP:[1 week]],PROVIDER:[TOKEN:[[id number]],FOLLOWUP:[1 week]] PROVIDER:[TOKEN:[[id number]],FOLLOWUP:[1 week]],PROVIDER:[TOKEN:[[id number]],SCHEDULEDAPPT:[07/19/2021],SCHEDULEDAPPTTIME:[03:00 PM]]

## 2021-07-15 NOTE — PROGRESS NOTE ADULT - ASSESSMENT
Patient is a 66 year old male, with PMH w/ CHF, afib ( coumadin),  HTN, HLD, and DM, sent by urgent care due to hypoxia.  BNP in 4000's. Admitted for CHF Vs COPD excerebration.
66 year old male with PMH w/ CHF, afib ( coumadin),  HTN, HLD, and DM who is admitted for COPD vs CHF exacarbation. Continue CTX/Azithro, steroid taper, and bronchodilators. is improving s/p IV Lasix, switch to PO tomorrow, monitor Cr, resumed Entresto. Switch Solumedrol to oral Pred 40 daily tomorrow. 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 Gonzalez, continue w/ medical management INR now normalized, resume coumadin at lower dose of 2mg, continue to monitor INR. DM poorly controlled likely due to high dose steroids, tapering as above, increase Lispro to 8 premeal and Lantus to 15 qhs, continue to monitor and adjust accordingly. Will need outpatient CT chest in 3 months to follow up on nodule. 
Patient is a 66 year old male, with PMH w/ CHF, afib ( coumadin),  HTN, HLD, and DM, sent by urgent care due to hypoxia.  BNP in 4000's. Admitted for CHF Vs COPD excerebration.  

## 2021-07-15 NOTE — DISCHARGE NOTE PROVIDER - NSDCCPCAREPLAN_GEN_ALL_CORE_FT
PRINCIPAL DISCHARGE DIAGNOSIS  Diagnosis: Acute respiratory failure with hypoxia  Assessment and Plan of Treatment: You came with shortness of breath, likely from COPD exacerbation. oxygen supplement was started and you improved. You don't need oxygen now. Please read "COPD exacerbation section".      SECONDARY DISCHARGE DIAGNOSES  Diagnosis: COPD exacerbation  Assessment and Plan of Treatment: Your shortness of breath was deu to worsening of COPD. We managed with IV steroid and switched to oral steroid. Also gave inhaler and neburizer. Your symptoms are improved. Please continue with oral steroid prednisone 20mg for 2 days, then take 10mg for 2 days. Continue with inhalers. Follow up with pulmonologist.    Diagnosis: Supratherapeutic international normalized ratio (INR)  Assessment and Plan of Treatment: Your INR on admission was high at 3.6 and we adjusted Coumadin dosage during hospital stay and INR went down. Please keep taking Coumadin 4mg daily and follow up with Rebecca Rose WITHIN 1 WEEK to check your INR level.    Diagnosis: EUGENIO (acute kidney injury)  Assessment and Plan of Treatment: You had acute kidney injury on admission, we gave IV fluid and it improved. It was likely from dehydration. Please follow up with your primary care doctor regularly.    Diagnosis: CHF (congestive heart failure)  Assessment and Plan of Treatment: Please continue with oral lasix,metoprolol and statin and follow up with cardiologist Dr. Gonzalez.    Diagnosis: HTN (hypertension)  Assessment and Plan of Treatment: You have high blood pressure. Please continue with lasix, metoprolol and IMDUR. It is important to continue to take your medications on time,  monitor your blood pressure at home, keep a log of your home readings and follow up with your Primary Care Physician. As per AHA/ACC guidelines, it is important to adhere to a DASH Diet of fresh fruits, vegetables, lean meats such as poultry and fish, and whole wheat carbs. 30 minutes of aerobic exercise per day 3-4 times a week, reducing salt intake <1.5g/day, and cutting down on highly processed foods are also shown to reduce BP. Uncontrolled BP may result in organ damage to your eyes, brain, heart, and kidneys by causing strokes, heart attacks, kidney failure, and bleeds in your eye.    Diagnosis: HLD (hyperlipidemia)  Assessment and Plan of Treatment: Lipid (LDL cholesterol, triglyceride) in your blood is high. Please have a healthy diet (low fat, high fiber) and take home medication. Please follow up with your primary care doctor.     PRINCIPAL DISCHARGE DIAGNOSIS  Diagnosis: Acute respiratory failure with hypoxia  Assessment and Plan of Treatment: You came with shortness of breath, likely from COPD exacerbation. oxygen supplement was started and you improved. You don't need oxygen now. Please read "COPD exacerbation section".      SECONDARY DISCHARGE DIAGNOSES  Diagnosis: COPD exacerbation  Assessment and Plan of Treatment: Your shortness of breath was deu to worsening of COPD. We managed with IV steroid and switched to oral steroid. Also gave inhaler and neburizer. Your symptoms are improved. Please continue with oral steroid prednisone 20mg for 2 days, then take 10mg for 2 days. Continue with inhalers. Follow up with pulmonologist.    Diagnosis: Supratherapeutic international normalized ratio (INR)  Assessment and Plan of Treatment: Your INR on admission was high at 3.6 and we adjusted Coumadin dosage during hospital stay and INR went down. Please keep taking Coumadin 4mg daily and follow up with Rebecca Rose WITHIN 1 WEEK to check your INR level.    Diagnosis: DM (diabetes mellitus)  Assessment and Plan of Treatment: Your hemoglobin A1C is 8.8%. Please eat healthy and low sugar diet (less sweets & bread or rice more vegetables), take prescribed medications and follow up with your primary care doctor and have eye and foot exam regularly.    Diagnosis: EUGENIO (acute kidney injury)  Assessment and Plan of Treatment: You had acute kidney injury on admission, we gave IV fluid and it improved. It was likely from dehydration. Please follow up with your primary care doctor regularly.    Diagnosis: CHF (congestive heart failure)  Assessment and Plan of Treatment: Please continue with oral lasix,metoprolol, entresto and  statin and follow up with cardiologist Dr. Gonzalez.    Diagnosis: HTN (hypertension)  Assessment and Plan of Treatment: You have high blood pressure. Please continue with lasix, metoprolol and IMDUR. It is important to continue to take your medications on time,  monitor your blood pressure at home, keep a log of your home readings and follow up with your Primary Care Physician. As per AHA/ACC guidelines, it is important to adhere to a DASH Diet of fresh fruits, vegetables, lean meats such as poultry and fish, and whole wheat carbs. 30 minutes of aerobic exercise per day 3-4 times a week, reducing salt intake <1.5g/day, and cutting down on highly processed foods are also shown to reduce BP. Uncontrolled BP may result in organ damage to your eyes, brain, heart, and kidneys by causing strokes, heart attacks, kidney failure, and bleeds in your eye.    Diagnosis: HLD (hyperlipidemia)  Assessment and Plan of Treatment: Lipid (LDL cholesterol, triglyceride) in your blood is high. Please have a healthy diet (low fat, high fiber) and take home medication. Please follow up with your primary care doctor.     PRINCIPAL DISCHARGE DIAGNOSIS  Diagnosis: Acute respiratory failure with hypoxia  Assessment and Plan of Treatment: You came with shortness of breath, likely from COPD exacerbation. oxygen supplement was started and you improved. You don't need oxygen now. Please read "COPD exacerbation section".      SECONDARY DISCHARGE DIAGNOSES  Diagnosis: COPD exacerbation  Assessment and Plan of Treatment: Your shortness of breath was deu to worsening of COPD. We managed with IV steroid and switched to oral steroid. Also gave inhaler and neburizer. Your symptoms are improved. Please continue with oral steroid prednisone 20mg for 2 days, then take 10mg for 2 days. Continue with inhalers. Follow up with pulmonologist.  Appoint with pulmonologits Dr. Barajas was made on 7/19 Mon 3pm at [address] [facility], Floor [address] [place] [address]. Phone: [phone]. If this time doesn't work, please call and re-schedule the appointment.    Diagnosis: Supratherapeutic international normalized ratio (INR)  Assessment and Plan of Treatment: Your INR on admission was high at 3.6 and we adjusted Coumadin dosage during hospital stay and INR went down. Please keep taking Coumadin 4mg daily and follow up with Rebecca Rose WITHIN 1 WEEK to check your INR level.    Diagnosis: DM (diabetes mellitus)  Assessment and Plan of Treatment: Your hemoglobin A1C is 8.8%. Please eat healthy and low sugar diet (less sweets & bread or rice more vegetables), take prescribed medications and follow up with your primary care doctor and have eye and foot exam regularly.    Diagnosis: EUGENIO (acute kidney injury)  Assessment and Plan of Treatment: You had acute kidney injury on admission, we gave IV fluid and it improved. It was likely from dehydration. Please follow up with your primary care doctor regularly.    Diagnosis: CHF (congestive heart failure)  Assessment and Plan of Treatment: Please continue with oral lasix,metoprolol, entresto and  statin and follow up with cardiologist Dr. Gonzalez.    Diagnosis: HTN (hypertension)  Assessment and Plan of Treatment: You have high blood pressure. Please continue with lasix, metoprolol and IMDUR. It is important to continue to take your medications on time,  monitor your blood pressure at home, keep a log of your home readings and follow up with your Primary Care Physician. As per AHA/ACC guidelines, it is important to adhere to a DASH Diet of fresh fruits, vegetables, lean meats such as poultry and fish, and whole wheat carbs. 30 minutes of aerobic exercise per day 3-4 times a week, reducing salt intake <1.5g/day, and cutting down on highly processed foods are also shown to reduce BP. Uncontrolled BP may result in organ damage to your eyes, brain, heart, and kidneys by causing strokes, heart attacks, kidney failure, and bleeds in your eye.    Diagnosis: HLD (hyperlipidemia)  Assessment and Plan of Treatment: Lipid (LDL cholesterol, triglyceride) in your blood is high. Please have a healthy diet (low fat, high fiber) and take home medication. Please follow up with your primary care doctor.

## 2021-07-15 NOTE — DISCHARGE NOTE PROVIDER - CARE PROVIDERS DIRECT ADDRESSES
,DirectAddress_Unknown ,DirectAddress_Unknown,[email or web address]

## 2021-07-19 ENCOUNTER — APPOINTMENT (OUTPATIENT)
Dept: PULMONOLOGY | Facility: CLINIC | Age: 66
End: 2021-07-19

## 2021-08-12 ENCOUNTER — TRANSCRIPTION ENCOUNTER (OUTPATIENT)
Age: 66
End: 2021-08-12

## 2021-10-12 NOTE — DISCHARGE NOTE PROVIDER - NSCORESITESY/N_GEN_A_CORE_RD
No
7 years
Referred To Mid-Level For Closure Text (Leave Blank If You Do Not Want): After obtaining clear surgical margins the patient was sent to a mid-level provider for surgical repair.  The patient understands they will receive post-surgical care and follow-up from the mid-level provider.

## 2021-11-08 ENCOUNTER — EMERGENCY (EMERGENCY)
Facility: HOSPITAL | Age: 66
LOS: 1 days | Discharge: HOME | End: 2021-11-08
Attending: EMERGENCY MEDICINE
Payer: COMMERCIAL

## 2021-11-08 VITALS
DIASTOLIC BLOOD PRESSURE: 75 MMHG | SYSTOLIC BLOOD PRESSURE: 144 MMHG | HEIGHT: 67 IN | TEMPERATURE: 98 F | WEIGHT: 218.26 LBS | OXYGEN SATURATION: 96 % | RESPIRATION RATE: 17 BRPM | HEART RATE: 75 BPM

## 2021-11-08 VITALS
RESPIRATION RATE: 18 BRPM | HEART RATE: 70 BPM | OXYGEN SATURATION: 97 % | TEMPERATURE: 98 F | SYSTOLIC BLOOD PRESSURE: 127 MMHG | DIASTOLIC BLOOD PRESSURE: 70 MMHG

## 2021-11-08 LAB
ALBUMIN SERPL ELPH-MCNC: 3.1 G/DL — LOW (ref 3.5–5)
ALP SERPL-CCNC: 72 U/L — SIGNIFICANT CHANGE UP (ref 40–120)
ALT FLD-CCNC: 59 U/L DA — SIGNIFICANT CHANGE UP (ref 10–60)
ANION GAP SERPL CALC-SCNC: 7 MMOL/L — SIGNIFICANT CHANGE UP (ref 5–17)
AST SERPL-CCNC: 45 U/L — HIGH (ref 10–40)
BASOPHILS # BLD AUTO: 0.06 K/UL — SIGNIFICANT CHANGE UP (ref 0–0.2)
BASOPHILS NFR BLD AUTO: 0.6 % — SIGNIFICANT CHANGE UP (ref 0–2)
BILIRUB SERPL-MCNC: 0.8 MG/DL — SIGNIFICANT CHANGE UP (ref 0.2–1.2)
BUN SERPL-MCNC: 30 MG/DL — HIGH (ref 7–18)
CALCIUM SERPL-MCNC: 9.2 MG/DL — SIGNIFICANT CHANGE UP (ref 8.4–10.5)
CHLORIDE SERPL-SCNC: 103 MMOL/L — SIGNIFICANT CHANGE UP (ref 96–108)
CO2 SERPL-SCNC: 29 MMOL/L — SIGNIFICANT CHANGE UP (ref 22–31)
CREAT SERPL-MCNC: 1.44 MG/DL — HIGH (ref 0.5–1.3)
EOSINOPHIL # BLD AUTO: 0.16 K/UL — SIGNIFICANT CHANGE UP (ref 0–0.5)
EOSINOPHIL NFR BLD AUTO: 1.7 % — SIGNIFICANT CHANGE UP (ref 0–6)
GLUCOSE SERPL-MCNC: 191 MG/DL — HIGH (ref 70–99)
HCT VFR BLD CALC: 41.7 % — SIGNIFICANT CHANGE UP (ref 39–50)
HGB BLD-MCNC: 13.5 G/DL — SIGNIFICANT CHANGE UP (ref 13–17)
IMM GRANULOCYTES NFR BLD AUTO: 0.5 % — SIGNIFICANT CHANGE UP (ref 0–1.5)
LYMPHOCYTES # BLD AUTO: 1.02 K/UL — SIGNIFICANT CHANGE UP (ref 1–3.3)
LYMPHOCYTES # BLD AUTO: 11 % — LOW (ref 13–44)
MCHC RBC-ENTMCNC: 29.3 PG — SIGNIFICANT CHANGE UP (ref 27–34)
MCHC RBC-ENTMCNC: 32.4 GM/DL — SIGNIFICANT CHANGE UP (ref 32–36)
MCV RBC AUTO: 90.5 FL — SIGNIFICANT CHANGE UP (ref 80–100)
MONOCYTES # BLD AUTO: 0.85 K/UL — SIGNIFICANT CHANGE UP (ref 0–0.9)
MONOCYTES NFR BLD AUTO: 9.1 % — SIGNIFICANT CHANGE UP (ref 2–14)
NEUTROPHILS # BLD AUTO: 7.16 K/UL — SIGNIFICANT CHANGE UP (ref 1.8–7.4)
NEUTROPHILS NFR BLD AUTO: 77.1 % — HIGH (ref 43–77)
NRBC # BLD: 0 /100 WBCS — SIGNIFICANT CHANGE UP (ref 0–0)
PLATELET # BLD AUTO: 175 K/UL — SIGNIFICANT CHANGE UP (ref 150–400)
POTASSIUM SERPL-MCNC: 4.4 MMOL/L — SIGNIFICANT CHANGE UP (ref 3.5–5.3)
POTASSIUM SERPL-SCNC: 4.4 MMOL/L — SIGNIFICANT CHANGE UP (ref 3.5–5.3)
PROT SERPL-MCNC: 7.5 G/DL — SIGNIFICANT CHANGE UP (ref 6–8.3)
RBC # BLD: 4.61 M/UL — SIGNIFICANT CHANGE UP (ref 4.2–5.8)
RBC # FLD: 14.2 % — SIGNIFICANT CHANGE UP (ref 10.3–14.5)
SODIUM SERPL-SCNC: 139 MMOL/L — SIGNIFICANT CHANGE UP (ref 135–145)
WBC # BLD: 9.3 K/UL — SIGNIFICANT CHANGE UP (ref 3.8–10.5)
WBC # FLD AUTO: 9.3 K/UL — SIGNIFICANT CHANGE UP (ref 3.8–10.5)

## 2021-11-08 PROCEDURE — 99285 EMERGENCY DEPT VISIT HI MDM: CPT

## 2021-11-08 PROCEDURE — 70450 CT HEAD/BRAIN W/O DYE: CPT | Mod: QQ

## 2021-11-08 PROCEDURE — 99284 EMERGENCY DEPT VISIT MOD MDM: CPT | Mod: 25

## 2021-11-08 PROCEDURE — 70487 CT MAXILLOFACIAL W/DYE: CPT | Mod: MA

## 2021-11-08 PROCEDURE — 85025 COMPLETE CBC W/AUTO DIFF WBC: CPT

## 2021-11-08 PROCEDURE — 70450 CT HEAD/BRAIN W/O DYE: CPT | Mod: 26,QQ

## 2021-11-08 PROCEDURE — 70487 CT MAXILLOFACIAL W/DYE: CPT | Mod: 26,MA

## 2021-11-08 PROCEDURE — 80053 COMPREHEN METABOLIC PANEL: CPT

## 2021-11-08 PROCEDURE — 36415 COLL VENOUS BLD VENIPUNCTURE: CPT

## 2021-11-08 RX ORDER — OXYCODONE HYDROCHLORIDE 5 MG/1
1 TABLET ORAL
Qty: 10 | Refills: 0
Start: 2021-11-08

## 2021-11-08 RX ORDER — OXYCODONE AND ACETAMINOPHEN 5; 325 MG/1; MG/1
1 TABLET ORAL ONCE
Refills: 0 | Status: DISCONTINUED | OUTPATIENT
Start: 2021-11-08 | End: 2021-11-08

## 2021-11-08 RX ORDER — VALACYCLOVIR 500 MG/1
1000 TABLET, FILM COATED ORAL ONCE
Refills: 0 | Status: COMPLETED | OUTPATIENT
Start: 2021-11-08 | End: 2021-11-08

## 2021-11-08 RX ORDER — VALACYCLOVIR 500 MG/1
1 TABLET, FILM COATED ORAL
Qty: 30 | Refills: 0
Start: 2021-11-08 | End: 2021-11-17

## 2021-11-08 RX ORDER — ERYTHROMYCIN BASE 5 MG/GRAM
1 OINTMENT (GRAM) OPHTHALMIC (EYE) ONCE
Refills: 0 | Status: COMPLETED | OUTPATIENT
Start: 2021-11-08 | End: 2021-11-08

## 2021-11-08 RX ADMIN — OXYCODONE AND ACETAMINOPHEN 1 TABLET(S): 5; 325 TABLET ORAL at 21:50

## 2021-11-08 RX ADMIN — VALACYCLOVIR 1000 MILLIGRAM(S): 500 TABLET, FILM COATED ORAL at 21:50

## 2021-11-08 RX ADMIN — Medication 1 APPLICATION(S): at 21:50

## 2021-11-08 NOTE — ED PROVIDER NOTE - PROGRESS NOTE DETAILS
case discussed with opthalmology via transfer center. recommendation for valtrex, erythromycin to affected skin area and apt tomorrow with opthalmology. patient's info provided to stomatologist, will call patient tomorrow at 9am with appointment time. Patient and daughter understand instructions. Notes chronic findings on CT head, communicated findings to patient.

## 2021-11-08 NOTE — ED PROVIDER NOTE - CLINICAL SUMMARY MEDICAL DECISION MAKING FREE TEXT BOX
Patient w/ left facial redness w/ papules vs tiny vesicles. Suspicious for zoster. Will call ophthalmology and reassess. Patient w/ left facial redness w/ papules vs tiny vesicles. Suspicious for zoster and concern for left eye involvement. Will call ophthalmology and reassess.

## 2021-11-08 NOTE — ED ADULT TRIAGE NOTE - CHIEF COMPLAINT QUOTE
fever (103) for 3 days with swelling on right face with redness since yesterday. took tylenol at 4pm today

## 2021-11-08 NOTE — ED PROVIDER NOTE - OBJECTIVE STATEMENT
67 y/o M, w/ history of diabetes, coming in w/ 3 days of fever and facial swelling and redness since yesterday. Patient also notes left eye irritation and denies any change in vision, nausea, vomiting, diarrhea, or any other symptoms. NKDA. 67 y/o M, w/ history of diabetes, coming in w/ 3 days of fever and new onset facial swelling and redness and pain/burning since yesterday. Patient also notes left eye irritation and burning and denies any change in vision, nausea, vomiting, diarrhea, or any other symptoms. NKDA.

## 2021-11-08 NOTE — ED PROVIDER NOTE - NSICDXPASTMEDICALHX_GEN_ALL_CORE_FT
PAST MEDICAL HISTORY:  Heart problem     Hyperlipidemia     Hypertension       DM (diabetes mellitus)

## 2021-11-08 NOTE — ED PROVIDER NOTE - SKIN, MLM
Skin normal color for race, warm, dry and intact. No evidence of rash. Skin normal color for race, warm, dry and intact. No evidence of rash other than left upper half of face and scalp

## 2021-11-08 NOTE — ED PROVIDER NOTE - PATIENT PORTAL LINK FT
You can access the FollowMyHealth Patient Portal offered by [facility] by registering at the following website: [email or web address]. By joining [employer]’s FollowMyHealth portal, you will also be able to view your health information using other applications (apps) compatible with our system.

## 2021-11-08 NOTE — ED PROVIDER NOTE - PHYSICAL EXAMINATION
Face: left upper part of face, including nose and maxilla, are erythematous and have papules/blisters  Eye: left eye conjunctival injection, woods lamp: no visible ulcerations  Neuro: normal neuro, ambulatory and steady gait Face: left upper part of head/face, including nose and maxilla forehead and scalp, are erythematous and have papules/tiny blisters  Eye: left eye conjunctival injection and tearing, woods lamp: no visible ulcerations no increased uptake   Neuro: normal neuro, ambulatory and steady gait

## 2021-11-08 NOTE — ED PROVIDER NOTE - NSFOLLOWUPINSTRUCTIONS_ED_ALL_ED_FT
Followup with opthamologist tomorrow. They should call you for appointment   [address] [facility] [address], [place].  Shingles    WHAT YOU NEED TO KNOW:    Shingles is a painful rash. Shingles is caused by the same virus that causes chickenpox (varicella-zoster). After you get chickenpox, the virus stays in your body for several years without causing any symptoms. Shingles occurs when the virus becomes active again. The active virus travels along a nerve to your skin and causes a rash.    Shingles         DISCHARGE INSTRUCTIONS:    Call your local emergency number (911 in the ) if:   •You have trouble moving your arms, legs, or face.      •You become confused, or have difficulty speaking.      •You have a seizure.      Seek care immediately if:   •You have weakness in an arm or leg.      •You have dizziness, a severe headache, or hearing or vision loss.      •You have painful, red, warm skin around the blisters, or the blisters drain pus.      •Your neck is stiff or you have trouble moving it.      Call your doctor if:   •You feel weak or have a headache.      •You have a cough, chills, or a fever.      •You have abdominal pain or nausea, or you are vomiting.      •Your rash becomes more itchy or painful.      •Your rash spreads to other parts of your body.      •Your pain worsens and does not go away even after you take medicine.      •You have questions or concerns about your condition or care.      Medicines: You may need any of the following:  •Antiviral medicine helps decrease symptoms and healing time. It may also decrease your risk for nerve pain. You will need to start taking this medicine within 3 days of the start of symptoms to prevent nerve pain.      •Prescription pain medicine may be given. Ask your healthcare provider how to take this medicine safely. Some prescription pain medicines contain acetaminophen. Do not take other medicines that contain acetaminophen without talking to your healthcare provider. Too much acetaminophen may cause liver damage. Prescription pain medicine may cause constipation. Ask your healthcare provider how to prevent or treat constipation.       •Acetaminophen decreases pain and fever. It is available without a doctor's order. Ask how much to take and how often to take it. Follow directions. Read the labels of all other medicines you are using to see if they also contain acetaminophen, or ask your doctor or pharmacist. Acetaminophen can cause liver damage if not taken correctly. Do not use more than 4 grams (4,000 milligrams) total of acetaminophen in one day.       •NSAIDs, such as ibuprofen, help decrease swelling, pain, and fever. This medicine is available with or without a doctor's order. NSAIDs can cause stomach bleeding or kidney problems in certain people. If you take blood thinner medicine, always ask if NSAIDs are safe for you. Always read the medicine label and follow directions. Do not give these medicines to children under 6 months of age without direction from your child's healthcare provider.      •Topical anesthetics are used to numb the skin and decrease pain. They can be a cream, gel, spray, or patch.       •Anticonvulsants decrease nerve pain and may help you sleep at night.      •Antidepressants may be used to decrease nerve pain.      •Take your medicine as directed. Contact your healthcare provider if you think your medicine is not helping or if you have side effects. Tell him or her if you are allergic to any medicine. Keep a list of the medicines, vitamins, and herbs you take. Include the amounts, and when and why you take them. Bring the list or the pill bottles to follow-up visits. Carry your medicine list with you in case of an emergency.      Self-care: Keep your rash clean and dry. Cover your rash with a bandage or clothing. Do not use bandages that stick to your skin. The sticky part may irritate your skin and make your rash last longer.    Prevent the spread of the germs:          •Wash your hands often. Wash your hands several times each day. Wash after you use the bathroom, change a child's diaper, and before you prepare or eat food. Use soap and water every time. Rub your soapy hands together, lacing your fingers. Wash the front and back of your hands, and in between your fingers. Use the fingers of one hand to scrub under the fingernails of the other hand. Wash for at least 20 seconds. Rinse with warm, running water for several seconds. Then dry your hands with a clean towel or paper towel. Use hand  that contains alcohol if soap and water are not available. Do not touch your eyes, nose, or mouth without washing your hands first.  Handwashing           •Cover a sneeze or cough. Use a tissue that covers your mouth and nose. Throw the tissue away in a trash can right away. Use the bend of your arm if a tissue is not available. Wash your hands well with soap and water or use a hand .      •Stay away from others while you are sick. Avoid crowds as much as possible.      •Ask about vaccines you may need. Talk to your healthcare provider about your vaccine history. He or she will tell you which vaccines you need, and when to get them.      Prevent shingles or another shingles outbreak: A vaccine may be given to help prevent shingles. You can get the vaccine even if you already had shingles. The vaccine can help prevent a future outbreak. If you do get shingles again, the vaccine can keep it from becoming severe. The vaccine comes in 2 forms. Your healthcare provider will tell you which form is right for you. The decision is based on your age and any medical conditions you have. A 2-dose vaccine is usually given to adults 50 years or older. A 1-dose vaccine may be given to adults 60 years or older.    Follow up with your doctor as directed: Write down your questions so you remember to ask them during your visits.

## 2021-11-11 ENCOUNTER — APPOINTMENT (OUTPATIENT)
Dept: OPHTHALMOLOGY | Facility: CLINIC | Age: 66
End: 2021-11-11
Payer: COMMERCIAL

## 2021-11-11 ENCOUNTER — NON-APPOINTMENT (OUTPATIENT)
Age: 66
End: 2021-11-11

## 2021-11-11 PROBLEM — E11.9 TYPE 2 DIABETES MELLITUS WITHOUT COMPLICATIONS: Chronic | Status: ACTIVE | Noted: 2021-11-08

## 2021-11-11 PROCEDURE — 92004 COMPRE OPH EXAM NEW PT 1/>: CPT

## 2021-11-15 ENCOUNTER — APPOINTMENT (OUTPATIENT)
Dept: OPHTHALMOLOGY | Facility: CLINIC | Age: 66
End: 2021-11-15

## 2022-01-10 ENCOUNTER — INPATIENT (INPATIENT)
Facility: HOSPITAL | Age: 67
LOS: 2 days | Discharge: HOME HEALTH | End: 2022-01-13
Attending: HOSPITALIST | Admitting: HOSPITALIST
Payer: MEDICAID

## 2022-01-10 VITALS
DIASTOLIC BLOOD PRESSURE: 78 MMHG | OXYGEN SATURATION: 98 % | HEART RATE: 82 BPM | TEMPERATURE: 97 F | SYSTOLIC BLOOD PRESSURE: 139 MMHG | RESPIRATION RATE: 20 BRPM | HEIGHT: 67 IN

## 2022-01-10 DIAGNOSIS — R06.02 SHORTNESS OF BREATH: ICD-10-CM

## 2022-01-10 LAB
ALBUMIN SERPL ELPH-MCNC: 3.3 G/DL — SIGNIFICANT CHANGE UP (ref 3.3–5)
ALP SERPL-CCNC: 97 U/L — SIGNIFICANT CHANGE UP (ref 40–120)
ALT FLD-CCNC: 139 U/L — HIGH (ref 4–41)
ANION GAP SERPL CALC-SCNC: 15 MMOL/L — HIGH (ref 7–14)
APTT BLD: 33.2 SEC — SIGNIFICANT CHANGE UP (ref 27–36.3)
AST SERPL-CCNC: 265 U/L — HIGH (ref 4–40)
BASOPHILS # BLD AUTO: 0.03 K/UL — SIGNIFICANT CHANGE UP (ref 0–0.2)
BASOPHILS NFR BLD AUTO: 0.3 % — SIGNIFICANT CHANGE UP (ref 0–2)
BILIRUB SERPL-MCNC: 0.9 MG/DL — SIGNIFICANT CHANGE UP (ref 0.2–1.2)
BLOOD GAS VENOUS COMPREHENSIVE RESULT: SIGNIFICANT CHANGE UP
BUN SERPL-MCNC: 30 MG/DL — HIGH (ref 7–23)
CALCIUM SERPL-MCNC: 9.2 MG/DL — SIGNIFICANT CHANGE UP (ref 8.4–10.5)
CHLORIDE SERPL-SCNC: 98 MMOL/L — SIGNIFICANT CHANGE UP (ref 98–107)
CO2 SERPL-SCNC: 28 MMOL/L — SIGNIFICANT CHANGE UP (ref 22–31)
CREAT SERPL-MCNC: 1.05 MG/DL — SIGNIFICANT CHANGE UP (ref 0.5–1.3)
EOSINOPHIL # BLD AUTO: 0.74 K/UL — HIGH (ref 0–0.5)
EOSINOPHIL NFR BLD AUTO: 7 % — HIGH (ref 0–6)
GLUCOSE BLDC GLUCOMTR-MCNC: 108 MG/DL — HIGH (ref 70–99)
GLUCOSE SERPL-MCNC: 53 MG/DL — CRITICAL LOW (ref 70–99)
HCT VFR BLD CALC: 46.9 % — SIGNIFICANT CHANGE UP (ref 39–50)
HGB BLD-MCNC: 14.6 G/DL — SIGNIFICANT CHANGE UP (ref 13–17)
IANC: 8.16 K/UL — SIGNIFICANT CHANGE UP (ref 1.5–8.5)
IMM GRANULOCYTES NFR BLD AUTO: 1.8 % — HIGH (ref 0–1.5)
INR BLD: 1.42 RATIO — HIGH (ref 0.88–1.16)
LYMPHOCYTES # BLD AUTO: 0.94 K/UL — LOW (ref 1–3.3)
LYMPHOCYTES # BLD AUTO: 9 % — LOW (ref 13–44)
MAGNESIUM SERPL-MCNC: 2.3 MG/DL — SIGNIFICANT CHANGE UP (ref 1.6–2.6)
MCHC RBC-ENTMCNC: 28.6 PG — SIGNIFICANT CHANGE UP (ref 27–34)
MCHC RBC-ENTMCNC: 31.1 GM/DL — LOW (ref 32–36)
MCV RBC AUTO: 91.8 FL — SIGNIFICANT CHANGE UP (ref 80–100)
MONOCYTES # BLD AUTO: 0.44 K/UL — SIGNIFICANT CHANGE UP (ref 0–0.9)
MONOCYTES NFR BLD AUTO: 4.2 % — SIGNIFICANT CHANGE UP (ref 2–14)
NEUTROPHILS # BLD AUTO: 8.16 K/UL — HIGH (ref 1.8–7.4)
NEUTROPHILS NFR BLD AUTO: 77.7 % — HIGH (ref 43–77)
NRBC # BLD: 0 /100 WBCS — SIGNIFICANT CHANGE UP
NRBC # FLD: 0.02 K/UL — HIGH
NT-PROBNP SERPL-SCNC: 3562 PG/ML — HIGH
PLATELET # BLD AUTO: 262 K/UL — SIGNIFICANT CHANGE UP (ref 150–400)
POTASSIUM SERPL-MCNC: 4 MMOL/L — SIGNIFICANT CHANGE UP (ref 3.5–5.3)
POTASSIUM SERPL-SCNC: 4 MMOL/L — SIGNIFICANT CHANGE UP (ref 3.5–5.3)
PROT SERPL-MCNC: 7.5 G/DL — SIGNIFICANT CHANGE UP (ref 6–8.3)
PROTHROM AB SERPL-ACNC: 16.1 SEC — HIGH (ref 10.6–13.6)
RBC # BLD: 5.11 M/UL — SIGNIFICANT CHANGE UP (ref 4.2–5.8)
RBC # FLD: 14.3 % — SIGNIFICANT CHANGE UP (ref 10.3–14.5)
SODIUM SERPL-SCNC: 141 MMOL/L — SIGNIFICANT CHANGE UP (ref 135–145)
TROPONIN T, HIGH SENSITIVITY RESULT: 25 NG/L — SIGNIFICANT CHANGE UP
TROPONIN T, HIGH SENSITIVITY RESULT: 26 NG/L — SIGNIFICANT CHANGE UP
WBC # BLD: 10.5 K/UL — SIGNIFICANT CHANGE UP (ref 3.8–10.5)
WBC # FLD AUTO: 10.5 K/UL — SIGNIFICANT CHANGE UP (ref 3.8–10.5)

## 2022-01-10 PROCEDURE — 71045 X-RAY EXAM CHEST 1 VIEW: CPT | Mod: 26

## 2022-01-10 PROCEDURE — 93010 ELECTROCARDIOGRAM REPORT: CPT

## 2022-01-10 PROCEDURE — 99285 EMERGENCY DEPT VISIT HI MDM: CPT | Mod: 25

## 2022-01-10 RX ORDER — FUROSEMIDE 40 MG
40 TABLET ORAL ONCE
Refills: 0 | Status: COMPLETED | OUTPATIENT
Start: 2022-01-10 | End: 2022-01-10

## 2022-01-10 RX ORDER — ALBUTEROL 90 UG/1
2.5 AEROSOL, METERED ORAL ONCE
Refills: 0 | Status: DISCONTINUED | OUTPATIENT
Start: 2022-01-10 | End: 2022-01-10

## 2022-01-10 RX ADMIN — Medication 40 MILLIGRAM(S): at 22:42

## 2022-01-10 NOTE — ED PROVIDER NOTE - NSICDXPASTMEDICALHX_GEN_ALL_CORE_FT
PAST MEDICAL HISTORY:  DM (diabetes mellitus)     Heart problem     Hyperlipidemia     Hypertension

## 2022-01-10 NOTE — ED ADULT TRIAGE NOTE - CHIEF COMPLAINT QUOTE
Pt. c/o chest congestion, sob, sweats, weakness and body aches x 3 days. Denies chest pain, cough or fevers. Daughter states he was hypoxic in low 90s at home. Pt. c/o chest congestion, sob, sweats, weakness and body aches x 3 days. Denies chest pain, cough or fevers. Daughter states he was hypoxic in low 90s at home.    Humaira (Daughter) [phone]

## 2022-01-10 NOTE — ED PROVIDER NOTE - NS ED ROS FT
Constitutional:  See HPI, weakness/fatigue  ENMT: No neck pain or stiffness  Cardiac:  +chest pain  Respiratory:  No cough or respiratory distress. feels dyspnic  GI:  No nausea, vomiting, diarrhea or abdominal pain.  :  No dysuria, dec urination  MS:  No back pain.  Neuro:  No headache   Skin:  No skin rash  Except as documented in the HPI,  all other systems are negative

## 2022-01-10 NOTE — ED ADULT NURSE NOTE - CHIEF COMPLAINT QUOTE
Pt. c/o chest congestion, sob, sweats, weakness and body aches x 3 days. Denies chest pain, cough or fevers. Daughter states he was hypoxic in low 90s at home.    Humaira (Daughter) [phone]

## 2022-01-10 NOTE — ED PROVIDER NOTE - ATTENDING CONTRIBUTION TO CARE
DR. SHAFFER, ATTENDING MD-  I performed a face to face bedside interview with the patient regarding history of present illness, review of symptoms and past medical history. I completed an independent physical exam.  I have discussed the patient's plan of care with the resident.   Documentation as above in the note.    65 y/o male h/o dm htn hld chf here with sob cough myalgias, fatigue x3 days.  Has received covid vax.  No known sick contacts or recent travel.  Eval for covid pna, chf exac, anemia, lyte abn.  Obtain cbc cmp trop bnp covid swab ekg cxr reassess.

## 2022-01-10 NOTE — ED PROVIDER NOTE - CLINICAL SUMMARY MEDICAL DECISION MAKING FREE TEXT BOX
sheila pgy1: 67yo M with DM htn hld chf ef 30% presents for inc fatigue, sob, and some chest pain. Check labs, trop, BNP, COVID. May be covid vs CHF exacerbation. albuterol for wheezing. CXR. Likely admit.    Cards Dr Gonzalez

## 2022-01-10 NOTE — ED ADULT NURSE NOTE - OBJECTIVE STATEMENT
Pt to bed 9. Alert and oriented x 3. Pt c.o SOB and weakness x 3 days. IVL placed. Bloods drawn. Will continue to monitor.

## 2022-01-10 NOTE — ED PROVIDER NOTE - PHYSICAL EXAMINATION
CONSTITUTIONAL: NAD, tired appearing elderly M sitting upright  SKIN: Warm dry  HEAD: NCAT  EYES: NL inspection  ENT: MMM  NECK: Supple; non tender. no clear JVD  CARD: RRR  RESP: expiratory wheezing, crackles in bases  ABD: S/NT no R/G, pannus  EXT: no pedal edema  NEURO: Grossly unremarkable  PSYCH: Cooperative, appropriate.

## 2022-01-10 NOTE — ED PROVIDER NOTE - OBJECTIVE STATEMENT
67yo M with PMH of DM, ?CHF presents to ED for inc SOB for 3d. Has felt weak, tired, and achy in wrists/knees for 3d. No fever, cough, congestion. 67yo M with PMH of DM, HTN, HLD, CHFrEF 30% presents to ED for inc SOB for 3d. Has felt weak, tired, and achy in wrists/knees for 3d. No fever, cough, congestion, sick contacts - fully vaccinated for COVID. He has also had pressure like CP lasting 5-6min, last in AM, happened at rest with some SOB but no radiation to arm or diaphoresis. Has had 'fluid around the body' before and is on lasix which reports good compliance with - however feels he is urinating less. No CP currently, SOB, abd pain, NVDC, inc LE edema. Daughter felt he might be wheezing earlier.

## 2022-01-10 NOTE — ED PROVIDER NOTE - PROGRESS NOTE DETAILS
sheila pgy1: no clear B lines on DEVORA POCUS. glucose noted 50s on vbg/bmp, confirmed FS. Pt asymptomatic, given juice.

## 2022-01-10 NOTE — ED PROCEDURE NOTE - PROCEDURE ADDITIONAL DETAILS
Emergency Department Focused Ultrasound performed at patient's bedside for educational purposes. The study will have a follow up study performed or was performed in the direct supervision of an ultrasound trained attending.     Follow up CXR

## 2022-01-11 DIAGNOSIS — J44.1 CHRONIC OBSTRUCTIVE PULMONARY DISEASE WITH (ACUTE) EXACERBATION: ICD-10-CM

## 2022-01-11 DIAGNOSIS — E78.5 HYPERLIPIDEMIA, UNSPECIFIED: ICD-10-CM

## 2022-01-11 DIAGNOSIS — E11.65 TYPE 2 DIABETES MELLITUS WITH HYPERGLYCEMIA: ICD-10-CM

## 2022-01-11 DIAGNOSIS — I10 ESSENTIAL (PRIMARY) HYPERTENSION: ICD-10-CM

## 2022-01-11 DIAGNOSIS — I48.91 UNSPECIFIED ATRIAL FIBRILLATION: ICD-10-CM

## 2022-01-11 DIAGNOSIS — Z29.9 ENCOUNTER FOR PROPHYLACTIC MEASURES, UNSPECIFIED: ICD-10-CM

## 2022-01-11 DIAGNOSIS — I50.9 HEART FAILURE, UNSPECIFIED: ICD-10-CM

## 2022-01-11 DIAGNOSIS — I51.3 INTRACARDIAC THROMBOSIS, NOT ELSEWHERE CLASSIFIED: ICD-10-CM

## 2022-01-11 DIAGNOSIS — R74.01 ELEVATION OF LEVELS OF LIVER TRANSAMINASE LEVELS: ICD-10-CM

## 2022-01-11 DIAGNOSIS — U07.1 COVID-19: ICD-10-CM

## 2022-01-11 LAB
A1C WITH ESTIMATED AVERAGE GLUCOSE RESULT: 10.9 % — HIGH (ref 4–5.6)
ALBUMIN SERPL ELPH-MCNC: 3.4 G/DL — SIGNIFICANT CHANGE UP (ref 3.3–5)
ALP SERPL-CCNC: 98 U/L — SIGNIFICANT CHANGE UP (ref 40–120)
ALT FLD-CCNC: 130 U/L — HIGH (ref 4–41)
ANION GAP SERPL CALC-SCNC: 14 MMOL/L — SIGNIFICANT CHANGE UP (ref 7–14)
AST SERPL-CCNC: 169 U/L — HIGH (ref 4–40)
BILIRUB SERPL-MCNC: 0.7 MG/DL — SIGNIFICANT CHANGE UP (ref 0.2–1.2)
BUN SERPL-MCNC: 30 MG/DL — HIGH (ref 7–23)
CALCIUM SERPL-MCNC: 8.8 MG/DL — SIGNIFICANT CHANGE UP (ref 8.4–10.5)
CHLORIDE SERPL-SCNC: 95 MMOL/L — LOW (ref 98–107)
CO2 SERPL-SCNC: 28 MMOL/L — SIGNIFICANT CHANGE UP (ref 22–31)
CREAT SERPL-MCNC: 0.94 MG/DL — SIGNIFICANT CHANGE UP (ref 0.5–1.3)
CRP SERPL-MCNC: 281.6 MG/L — HIGH
ESTIMATED AVERAGE GLUCOSE: 266 — SIGNIFICANT CHANGE UP
FERRITIN SERPL-MCNC: 1077 NG/ML — HIGH (ref 30–400)
GLUCOSE BLDC GLUCOMTR-MCNC: 293 MG/DL — HIGH (ref 70–99)
GLUCOSE SERPL-MCNC: 322 MG/DL — HIGH (ref 70–99)
LDH SERPL L TO P-CCNC: 410 U/L — HIGH (ref 135–225)
MAGNESIUM SERPL-MCNC: 2.1 MG/DL — SIGNIFICANT CHANGE UP (ref 1.6–2.6)
MRSA PCR RESULT.: SIGNIFICANT CHANGE UP
PHOSPHATE SERPL-MCNC: 4.6 MG/DL — HIGH (ref 2.5–4.5)
POTASSIUM SERPL-MCNC: 4.2 MMOL/L — SIGNIFICANT CHANGE UP (ref 3.5–5.3)
POTASSIUM SERPL-SCNC: 4.2 MMOL/L — SIGNIFICANT CHANGE UP (ref 3.5–5.3)
PROCALCITONIN SERPL-MCNC: 0.36 NG/ML — HIGH (ref 0.02–0.1)
PROT SERPL-MCNC: 7.2 G/DL — SIGNIFICANT CHANGE UP (ref 6–8.3)
S AUREUS DNA NOSE QL NAA+PROBE: SIGNIFICANT CHANGE UP
SARS-COV-2 RNA SPEC QL NAA+PROBE: DETECTED
SODIUM SERPL-SCNC: 137 MMOL/L — SIGNIFICANT CHANGE UP (ref 135–145)

## 2022-01-11 PROCEDURE — 99223 1ST HOSP IP/OBS HIGH 75: CPT

## 2022-01-11 PROCEDURE — 12345: CPT | Mod: NC,GC

## 2022-01-11 PROCEDURE — 99255 IP/OBS CONSLTJ NEW/EST HI 80: CPT | Mod: GC

## 2022-01-11 PROCEDURE — 93306 TTE W/DOPPLER COMPLETE: CPT | Mod: 26

## 2022-01-11 RX ORDER — SACUBITRIL AND VALSARTAN 24; 26 MG/1; MG/1
1 TABLET, FILM COATED ORAL
Refills: 0 | Status: DISCONTINUED | OUTPATIENT
Start: 2022-01-11 | End: 2022-01-11

## 2022-01-11 RX ORDER — SODIUM CHLORIDE 9 MG/ML
1000 INJECTION, SOLUTION INTRAVENOUS
Refills: 0 | Status: DISCONTINUED | OUTPATIENT
Start: 2022-01-11 | End: 2022-01-13

## 2022-01-11 RX ORDER — DEXTROSE 50 % IN WATER 50 %
12.5 SYRINGE (ML) INTRAVENOUS ONCE
Refills: 0 | Status: DISCONTINUED | OUTPATIENT
Start: 2022-01-11 | End: 2022-01-13

## 2022-01-11 RX ORDER — INSULIN GLARGINE 100 [IU]/ML
20 INJECTION, SOLUTION SUBCUTANEOUS
Refills: 0 | Status: DISCONTINUED | OUTPATIENT
Start: 2022-01-12 | End: 2022-01-12

## 2022-01-11 RX ORDER — SACUBITRIL AND VALSARTAN 24; 26 MG/1; MG/1
1 TABLET, FILM COATED ORAL
Qty: 0 | Refills: 0 | DISCHARGE

## 2022-01-11 RX ORDER — SACUBITRIL AND VALSARTAN 24; 26 MG/1; MG/1
1 TABLET, FILM COATED ORAL
Refills: 0 | Status: DISCONTINUED | OUTPATIENT
Start: 2022-01-11 | End: 2022-01-13

## 2022-01-11 RX ORDER — METOPROLOL TARTRATE 50 MG
25 TABLET ORAL DAILY
Refills: 0 | Status: DISCONTINUED | OUTPATIENT
Start: 2022-01-11 | End: 2022-01-13

## 2022-01-11 RX ORDER — APIXABAN 2.5 MG/1
5 TABLET, FILM COATED ORAL EVERY 12 HOURS
Refills: 0 | Status: DISCONTINUED | OUTPATIENT
Start: 2022-01-11 | End: 2022-01-13

## 2022-01-11 RX ORDER — OMEPRAZOLE 10 MG/1
1 CAPSULE, DELAYED RELEASE ORAL
Qty: 0 | Refills: 0 | DISCHARGE

## 2022-01-11 RX ORDER — DEXTROSE 50 % IN WATER 50 %
15 SYRINGE (ML) INTRAVENOUS ONCE
Refills: 0 | Status: DISCONTINUED | OUTPATIENT
Start: 2022-01-11 | End: 2022-01-13

## 2022-01-11 RX ORDER — GLUCAGON INJECTION, SOLUTION 0.5 MG/.1ML
1 INJECTION, SOLUTION SUBCUTANEOUS ONCE
Refills: 0 | Status: DISCONTINUED | OUTPATIENT
Start: 2022-01-11 | End: 2022-01-13

## 2022-01-11 RX ORDER — WARFARIN SODIUM 2.5 MG/1
1 TABLET ORAL
Qty: 0 | Refills: 0 | DISCHARGE

## 2022-01-11 RX ORDER — INSULIN LISPRO 100/ML
VIAL (ML) SUBCUTANEOUS
Refills: 0 | Status: DISCONTINUED | OUTPATIENT
Start: 2022-01-11 | End: 2022-01-11

## 2022-01-11 RX ORDER — ACETAMINOPHEN 500 MG
650 TABLET ORAL EVERY 6 HOURS
Refills: 0 | Status: DISCONTINUED | OUTPATIENT
Start: 2022-01-11 | End: 2022-01-13

## 2022-01-11 RX ORDER — INSULIN GLARGINE 100 [IU]/ML
20 INJECTION, SOLUTION SUBCUTANEOUS ONCE
Refills: 0 | Status: COMPLETED | OUTPATIENT
Start: 2022-01-11 | End: 2022-01-11

## 2022-01-11 RX ORDER — ISOSORBIDE MONONITRATE 60 MG/1
30 TABLET, EXTENDED RELEASE ORAL DAILY
Refills: 0 | Status: DISCONTINUED | OUTPATIENT
Start: 2022-01-11 | End: 2022-01-13

## 2022-01-11 RX ORDER — INSULIN LISPRO 100/ML
VIAL (ML) SUBCUTANEOUS
Refills: 0 | Status: DISCONTINUED | OUTPATIENT
Start: 2022-01-11 | End: 2022-01-13

## 2022-01-11 RX ORDER — PANTOPRAZOLE SODIUM 20 MG/1
40 TABLET, DELAYED RELEASE ORAL
Refills: 0 | Status: DISCONTINUED | OUTPATIENT
Start: 2022-01-11 | End: 2022-01-13

## 2022-01-11 RX ORDER — ASPIRIN/CALCIUM CARB/MAGNESIUM 324 MG
1 TABLET ORAL
Qty: 0 | Refills: 0 | DISCHARGE

## 2022-01-11 RX ORDER — CHLORHEXIDINE GLUCONATE 213 G/1000ML
1 SOLUTION TOPICAL DAILY
Refills: 0 | Status: DISCONTINUED | OUTPATIENT
Start: 2022-01-11 | End: 2022-01-13

## 2022-01-11 RX ORDER — DEXTROSE 50 % IN WATER 50 %
25 SYRINGE (ML) INTRAVENOUS ONCE
Refills: 0 | Status: DISCONTINUED | OUTPATIENT
Start: 2022-01-11 | End: 2022-01-13

## 2022-01-11 RX ORDER — ROSUVASTATIN CALCIUM 5 MG/1
1 TABLET ORAL
Qty: 0 | Refills: 0 | DISCHARGE

## 2022-01-11 RX ORDER — FUROSEMIDE 40 MG
40 TABLET ORAL
Refills: 0 | Status: DISCONTINUED | OUTPATIENT
Start: 2022-01-11 | End: 2022-01-13

## 2022-01-11 RX ORDER — INSULIN LISPRO 100/ML
VIAL (ML) SUBCUTANEOUS AT BEDTIME
Refills: 0 | Status: DISCONTINUED | OUTPATIENT
Start: 2022-01-11 | End: 2022-01-13

## 2022-01-11 RX ORDER — INSULIN LISPRO 100/ML
7 VIAL (ML) SUBCUTANEOUS
Refills: 0 | Status: DISCONTINUED | OUTPATIENT
Start: 2022-01-11 | End: 2022-01-12

## 2022-01-11 RX ORDER — PANTOPRAZOLE SODIUM 20 MG/1
1 TABLET, DELAYED RELEASE ORAL
Qty: 0 | Refills: 0 | DISCHARGE

## 2022-01-11 RX ORDER — WARFARIN SODIUM 2.5 MG/1
4.5 TABLET ORAL ONCE
Refills: 0 | Status: COMPLETED | OUTPATIENT
Start: 2022-01-11 | End: 2022-01-11

## 2022-01-11 RX ORDER — IPRATROPIUM/ALBUTEROL SULFATE 18-103MCG
3 AEROSOL WITH ADAPTER (GRAM) INHALATION EVERY 6 HOURS
Refills: 0 | Status: DISCONTINUED | OUTPATIENT
Start: 2022-01-11 | End: 2022-01-12

## 2022-01-11 RX ORDER — FUROSEMIDE 40 MG
40 TABLET ORAL EVERY 12 HOURS
Refills: 0 | Status: DISCONTINUED | OUTPATIENT
Start: 2022-01-11 | End: 2022-01-11

## 2022-01-11 RX ORDER — GLYBURIDE 5 MG
1 TABLET ORAL
Qty: 0 | Refills: 0 | DISCHARGE

## 2022-01-11 RX ORDER — INSULIN LISPRO 100/ML
VIAL (ML) SUBCUTANEOUS AT BEDTIME
Refills: 0 | Status: DISCONTINUED | OUTPATIENT
Start: 2022-01-11 | End: 2022-01-11

## 2022-01-11 RX ADMIN — SACUBITRIL AND VALSARTAN 1 TABLET(S): 24; 26 TABLET, FILM COATED ORAL at 17:35

## 2022-01-11 RX ADMIN — Medication 40 MILLIGRAM(S): at 00:39

## 2022-01-11 RX ADMIN — Medication 8: at 17:34

## 2022-01-11 RX ADMIN — SACUBITRIL AND VALSARTAN 1 TABLET(S): 24; 26 TABLET, FILM COATED ORAL at 07:15

## 2022-01-11 RX ADMIN — ISOSORBIDE MONONITRATE 30 MILLIGRAM(S): 60 TABLET, EXTENDED RELEASE ORAL at 11:20

## 2022-01-11 RX ADMIN — Medication 3: at 08:35

## 2022-01-11 RX ADMIN — WARFARIN SODIUM 4.5 MILLIGRAM(S): 2.5 TABLET ORAL at 00:39

## 2022-01-11 RX ADMIN — Medication 25 MILLIGRAM(S): at 07:15

## 2022-01-11 RX ADMIN — Medication 7 UNIT(S): at 17:34

## 2022-01-11 RX ADMIN — CHLORHEXIDINE GLUCONATE 1 APPLICATION(S): 213 SOLUTION TOPICAL at 12:21

## 2022-01-11 RX ADMIN — INSULIN GLARGINE 20 UNIT(S): 100 INJECTION, SOLUTION SUBCUTANEOUS at 14:22

## 2022-01-11 RX ADMIN — Medication 5: at 12:20

## 2022-01-11 RX ADMIN — Medication 3 MILLILITER(S): at 00:39

## 2022-01-11 RX ADMIN — Medication 40 MILLIGRAM(S): at 17:35

## 2022-01-11 RX ADMIN — Medication 40 MILLIGRAM(S): at 07:16

## 2022-01-11 RX ADMIN — PANTOPRAZOLE SODIUM 40 MILLIGRAM(S): 20 TABLET, DELAYED RELEASE ORAL at 07:16

## 2022-01-11 NOTE — H&P ADULT - NSHPPHYSICALEXAM_GEN_ALL_CORE
PHYSICAL EXAM:  Vital Signs Last 24 Hrs  T(C): 36.4 (01-10-22 @ 22:43)  T(F): 97.5 (01-10-22 @ 22:43), Max: 97.5 (01-10-22 @ 22:43)  HR: 86 (01-10-22 @ 22:43) (82 - 86)  BP: 131/85 (01-10-22 @ 22:43)  BP(mean): --  RR: 21 (01-10-22 @ 22:43) (20 - 21)  SpO2: 99% (01-10-22 @ 22:43) (98% - 99%)  Wt(kg): --    Constitutional: NAD, awake and alert, well developed  EYES: EOMI, conjunctiva clear  ENT:  Normal Hearing, no tonsillar exudates   Neck: Soft and supple , no thyromegaly   Respiratory: inspiratory and expiratory wheezing throughout, No rales or rhonchi, no tachypnea, no accessory muscle use  Cardiovascular: S1 and S2, regular rate and rhythm, no Murmurs, gallops or rubs, no JVD, mild trace LE edema bilaterally  Gastrointestinal: Bowel Sounds present, soft, nontender, nondistended, no guarding, no rebound  Extremities: No cyanosis or clubbing; warm to touch  Neurological: No focal deficits, CN II-XII intact bilaterally, sensation to light touch intact in all extremities. Pupils are equally reactive to light and symmetrical in size.   Musculoskeletal: 5/5 strength b/l upper and lower extremities; no joint swelling. No joint swelling to wrists or ankles, no TTP, FROM   Skin: No rashes, no ulcerations

## 2022-01-11 NOTE — H&P ADULT - PROBLEM SELECTOR PLAN 1
Mild LE edema pt notes worsened from baseline over last few days. Elevated probnp, mild pulm edema on CXR consistent with likely CHF exacerbation. Also wheezing on exam which may be cardiac wheeze? vs underlying undiagnosed COPD? Has CO2 elevation as well  -lasix 40 IV q12  -cards consult in AM - Dr. Gonzalez is cardiologist  -echo ordered  -monitor on tele  -monitor lytes K>4, Mg >2 Possible COVID PNA on CXR. Pt vaccinated but not boosted. Not hypoxic  Likely contributing to SOB and possible COPD exacerbation  -given not hypoxic hold off on remdesivir or decadron  -inflammatory markers elevated, check D-dimer  -dose titrate warfarin for AC

## 2022-01-11 NOTE — PROGRESS NOTE ADULT - PROBLEM SELECTOR PLAN 7
Resume metoprolol  Confirm entresto dose - daughter confirms he is on but does not know dose. Resuming lowest dose patient was on on last admission - slightly elevated, but downtrending this AM  - Pt with infection and in setting of CHF

## 2022-01-11 NOTE — H&P ADULT - PROBLEM SELECTOR PROBLEM 5
Type 2 diabetes mellitus with hyperglycemia, without long-term current use of insulin Left ventricular thrombus

## 2022-01-11 NOTE — PROGRESS NOTE ADULT - PROBLEM SELECTOR PROBLEM 6
Type 2 diabetes mellitus with hyperglycemia, without long-term current use of insulin HTN (hypertension)

## 2022-01-11 NOTE — PROGRESS NOTE ADULT - PROBLEM SELECTOR PLAN 1
Possible COVID PNA on CXR. Pt vaccinated but not boosted. Not hypoxic  Likely contributing to SOB and possible COPD exacerbation  -given not hypoxic hold off on remdesivir or decadron  -inflammatory markers elevated, check D-dimer  -dose titrate warfarin for AC - 1/10 CXR with R sided peripheral opacity consistent with COVID pneumonia  - Pt vaccinated but not boosted, COVID positive 1/10  - O2 saturations today 100% on room air, not a candidate for remdesivir or decadron  - Continue to monitor, symptoms likely from infection rather than CHF exacerbation at this point  - Patient with insignificant smoking history and no diagnosis of COPD, s/p 40mg prednisone for possible COPD component but will discontinue this  - Can likely discharge tomorrow, vital signs stable

## 2022-01-11 NOTE — PROGRESS NOTE ADULT - SUBJECTIVE AND OBJECTIVE BOX
Samantha Robertson MD  EM/IM PGY-1  Pager # [phone]  --------------------------------  ==============UNFINISHED NOTE==================   Samantha Robertson MD  EM/IM PGY-1  Pager # [phone]  --------------------------------  OVERNIGHT: Admitted from ED with COVID + and concern for CHF exacerbation overnight, given 40mg prednisone, 40 IV furosemide, 1 alb/ipra neb.       SUBJECTIVE: Feels a little better but similar to yesterday, does not feel like the IV lasix helped very much. Is able to sit up and eat and talk without feeling short of breath this morning. denies chest pain, abdominal pain, diarrhea, vomiting, anorexia, cough, fever.      PHYSICAL EXAM:  GENERAL: Sitting comfortably in bed in no acute distress  NEURO: Alert and Oriented to person, place, date and situation. Pupils symmetric, No ptosis. No facial asymmetry or dysarthria, no tremor noted.  HEENT: No conjunctival injection or scleral icterus.   CARD: Regular rate and rhythm, no murmurs or gallops appreciated.  RESP: inspiratory rales in LLL.  ABD: Bowel sounds active. Very distended obese abdomen, Soft and nontender to palpation in all quadrants, no guarding, no rigidity. No masses appreciated.  EXT: Trace pitting edema to the R shin. 2+DP pulses bilaterally.  SKIN: No rashes, bruising or acute skin injuries on face, limbs,  chest or back    Vital Signs Last 24 Hrs  T(C): 36.7 (11 Jan 2022 10:46), Max: 37.2 (11 Jan 2022 06:16)  T(F): 98.1 (11 Jan 2022 10:46), Max: 98.9 (11 Jan 2022 06:16)  HR: 76 (11 Jan 2022 10:46) (73 - 86)  BP: 139/80 (11 Jan 2022 10:46) (131/85 - 145/91)  BP(mean): --  RR: 18 (11 Jan 2022 10:46) (18 - 21)  SpO2: 100% (11 Jan 2022 10:46) (98% - 100%)    .  LABS:                         14.6   10.50 )-----------( 262      ( 10 Charles 2022 19:59 )             46.9     01-11    137  |  95<L>  |  30<H>  ----------------------------<  322<H>  4.2   |  28  |  0.94    Ca    8.8      11 Jan 2022 07:50  Phos  4.6     01-11  Mg     2.10     01-11    TPro  7.2  /  Alb  3.4  /  TBili  0.7  /  DBili  x   /  AST  169<H>  /  ALT  130<H>  /  AlkPhos  98  01-11    PT/INR - ( 10 Charles 2022 22:57 )   PT: 16.1 sec;   INR: 1.42 ratio         PTT - ( 10 Charles 2022 22:57 )  PTT:33.2 sec             27-Aug-2021 19:00

## 2022-01-11 NOTE — PROGRESS NOTE ADULT - ASSESSMENT
66M with PMH of DM, HTN, HLD, HFrEF 30%, LV thrombus since 2017, Afib on warfarin presented on 1/11 with SOB and myalgias x3 days, COVID +, admitted for management of CHF exacerbation vs COVID pneumonia

## 2022-01-11 NOTE — PROGRESS NOTE ADULT - PROBLEM SELECTOR PROBLEM 5
Left ventricular thrombus Type 2 diabetes mellitus with hyperglycemia, without long-term current use of insulin

## 2022-01-11 NOTE — H&P ADULT - PROBLEM SELECTOR PLAN 6
Resume metoprolol  Confirm entresto dose - daughter confirms he is on but does not know dose. Resuming lowest dose patient was on on last admission On metformin 1g BID, glipizide 10mg BID, farxiga 5mg qd - hold inpatietn  low dose ISS for now given hypoglycemia to 50s. Now   FSG may elevated while on steroids and may require some lantus on admission. Will closely monitor FSGs and adjust as appropriate  Add on A1C

## 2022-01-11 NOTE — H&P ADULT - HISTORY OF PRESENT ILLNESS
66M with PMH of DM, HTN, HLD, HFrEF 30%, LV thrombus since 2017, Afib on warfarin presenting with SOB x3 days. Pt at baseline is able to ambulate well for up to 30minutes. Over the last three days he has had decreased ET to less than 2 blocks. His chronic cough is unchanged but he has had some sweats and body aches. His wrists and ankles have steady constant pain but denies swelling, sharp pain or difficulty ROM. Pain is actually made better with rest. SOB is made better with rest and sitting down and worse with ambulation. Pt also endorses episode of chest tightness in the upper chest, nonradiating this morning, lasting two minutes. Pain went away on its own. Pt also had some diaphoresis. Denies palpitations today but had two days ago. Pt's cardiologist is Dr. Gonzalez who the daughter believes saw the patient one month ago. Pt is covid vaccinated in March but without a booster. He denies fevers, chills, sore throat, nausea, vomiting and diarrhea. Denies sick contacts of covid.

## 2022-01-11 NOTE — CONSULT NOTE ADULT - ATTENDING COMMENTS
Uncontrolled DM2 COVID, not on dexamethasone, CHF. Severe hyperglycemia inpatient.  Inpatient agree with insulin regimen as outlined.  For dc optimize oral/GLP1RA as outlined.  Endocrine team consulted for uncontrolled diabetes. Patient is high risk with high level decision making due to uncontrolled diabetes which places patient at high risk for cardiovascular and cerebrovascular events. Patient with lability of glucose requiring close monitoring and insulin adjustments.    Rocky Shaikh MD  Division of Endocrinology  Pager: [phone]    If after 6PM or before 9AM, or on weekends/holidays, please call endocrine answering service for assistance ([phone]).  For nonurgent matters email [email or web address] for assistance. Uncontrolled DM2 COVID, not on dexamethasone, CHF. Severe hyperglycemia inpatient. HBA1c 10.9%  glucose exacerbated by prednisone in hospital.  Inpatient agree with insulin regimen as outlined.  For dc optimize oral/GLP1RA as outlined.  Endocrine team consulted for uncontrolled diabetes. Patient is high risk with high level decision making due to uncontrolled diabetes which places patient at high risk for cardiovascular and cerebrovascular events. Patient with lability of glucose requiring close monitoring and insulin adjustments.    Rocky Shaikh MD  Division of Endocrinology  Pager: [phone]    If after 6PM or before 9AM, or on weekends/holidays, please call endocrine answering service for assistance ([phone]).  For nonurgent matters email [email or web address] for assistance.

## 2022-01-11 NOTE — H&P ADULT - NSHPPOADEEPVENOUSTHROMB_GEN_A_CORE
no
- TTE done as aboe 1/10/2020 showing normal LVSF with grade 1 DD  - Cardiology ([facility]) consulted, f/u recs

## 2022-01-11 NOTE — CONSULT NOTE ADULT - ASSESSMENT
66M with PMH of DM, HTN, HLD, HFrEF 30%, LV thrombus since 2017, Afib on warfarin presenting with SOB x3 days, admitted with COVID PNA.     Consulted for: Uncontrolled T2DM    #Uncontrolled T2DM c/b CAD with HFrEF  A1c 10.9%. Goal < 8%  Inpatient FS goal 100-180. FS above goal   Home regimen: Metformin 1g BID, Farxiga 5 mg daily, Glyburide 5 mg BID  Patient states he has a poor appetite   Recs:   -Lantus __ units every day at 2pm  -Admelog __ units qAC TID. HOLD if NPO or not eating  -Start low dose correctional scale qAC and qHS. No need to hold if NPO or not eating  -Consistent carb diet  -FS qAC and qHS  For d/c:   -Metformin 1g BID + Farxiga 10 mg (increased from home dose 5 mg) + GLP-1 agonist (if covered). See below. If not covered, consider Januvia 100 mg daily  -Please send Trulicity 0.75 mg subq/weekly or Ozempic 0.25 mg subq/weekly to pharmacy to see if covered. If covered, can send either to pharmacy on d/c. If sending Trulicity, please write script for 0.75 mg subq/weekly x 4 weeks and a separate script for Trulicity 1.5 mg subq/weekly to be initiated after 4 weeks of using 0.75 mg. If sending Ozempic, please send 0.25 mg subq/weekly x 4 weeks, and increase to 0.5 mg subq/weekly after 4 weeks  -Can f/u at [facility] for appointment with Endocrinology    #HTN  BP acceptable  Management per primary team    #HLD  Can check lipid profile  Continue statin    Louisa Carballo MD  Endocrine Fellow  For new consults, follow up questions or discharge recommendations, please call the answering service at [phone] or [phone]   66M with PMH of DM, HTN, HLD, HFrEF 30%, LV thrombus since 2017, Afib on warfarin presenting with SOB x3 days, admitted with COVID PNA.     Consulted for: Uncontrolled T2DM    #Uncontrolled T2DM c/b CAD with HFrEF  A1c 10.9%. Goal < 8%  Inpatient FS goal 100-180. FS above goal   Home regimen: Metformin 1g BID, Farxiga 5 mg daily, Glyburide 5 mg BID  Patient states he has a poor appetite   Recs:   -Lantus 20 units every day at 2pm  -Admelog 7 units qAC TID. HOLD if NPO or not eating  -Start low dose correctional scale qAC and qHS. No need to hold if NPO or not eating  -Consistent carb diet  -FS qAC and qHS  For d/c:   -Metformin 1g BID + Farxiga 10 mg (increased from home dose 5 mg) + GLP-1 agonist (if covered). See below. If not covered, consider Januvia 100 mg daily  -Please confirm patient's insurance status  -Please send Trulicity 0.75 mg subq/weekly or Ozempic 0.25 mg subq/weekly to pharmacy to see if covered. If covered, can send either to pharmacy on d/c. If sending Trulicity, please write script for 0.75 mg subq/weekly x 4 weeks and a separate script for Trulicity 1.5 mg subq/weekly to be initiated after 4 weeks of using 0.75 mg. If sending Ozempic, please send 0.25 mg subq/weekly x 4 weeks, and increase to 0.5 mg subq/weekly after 4 weeks  -Can f/u [facility]: [id number] [place] [address]; Ph # [phone]    #HTN  BP acceptable  Management per primary team    #HLD  Can check lipid profile  Continue statin    Louisa Carballo MD  Endocrine Fellow  For new consults, follow up questions or discharge recommendations, please call the answering service at [phone] or [phone]

## 2022-01-11 NOTE — PROGRESS NOTE ADULT - PROBLEM SELECTOR PLAN 4
Afib on EKG, rate controlled  -subtherapeutic INR 1.42  -on warfarin 4mg on M-Fri, 3mg Sat-sunday  -will give 4.5mg today and check daily INR  -resume metoprolol Hx LV thrombus on echo 7.2021. On warfarin. Per daughter this has been known since 2017 and follows with cardiology Dr. Gonzalez  -echo ordered  -warfarin adjustment as above

## 2022-01-11 NOTE — H&P ADULT - PROBLEM SELECTOR PLAN 4
Hx LV thrombus on echo 7.2021. On warfarin  -echo ordered  -warfarin adjustment as above Hx LV thrombus on echo 7.2021. On warfarin. Per daughter this has been known since 2017 and follows with cardiology Dr. Gonzalez  -echo ordered  -warfarin adjustment as above Afib on EKG, rate controlled  -subtherapeutic INR 1.42  -on warfarin 4mg on M-Fri, 3mg Sat-sunday  -will give 4.5mg today and check daily INR  -resume metoprolol

## 2022-01-11 NOTE — H&P ADULT - NSRESEARCHGRNTASSESS_GEN_ALL_CORE FT
IMPROVE-DD Assessment completed: Jan 11 2022 12:18AM Closure 2 Information: This tab is for additional flaps and grafts, including complex repair and grafts and complex repair and flaps. You can also specify a different location for the additional defect, if the location is the same you do not need to select a new one. We will insert the automated text for the repair you select below just as we do for solitary flaps and grafts. Please note that at this time if you select a location with a different insurance zone you will need to override the ICD10 and CPT if appropriate.

## 2022-01-11 NOTE — PROGRESS NOTE ADULT - PROBLEM SELECTOR PLAN 3
Mild LE edema somewhat worsened per patient. S/p 40 IV lasix in ED. Will closely monitor volume status and hold off on further IV diuresis. More likely COVID and COPD as contributing to SOB than CHF. Not grossly overloaded. Wheezing more so heard on exam than crackles.  -echo ordered  - resume metoprolol, entresto - confirm home dose, lasix 40 BID Afib on EKG, rate controlled  -subtherapeutic INR 1.42 2200 1/10, given 4.5mg 0000 1/11  -on warfarin 4mg on M-Fri, 3mg Sat-sunday  -will give 5mg tonight and check daily INR  -resume metoprolol

## 2022-01-11 NOTE — PROGRESS NOTE ADULT - PROBLEM SELECTOR PLAN 6
On metformin 1g BID, glipizide 10mg BID, farxiga 5mg qd - hold inpatietn  low dose ISS for now given hypoglycemia to 50s. Now   FSG may elevated while on steroids and may require some lantus on admission. Will closely monitor FSGs and adjust as appropriate  Add on A1C Resume metoprolol  Confirm entresto dose - daughter confirms he is on but does not know dose. Resuming lowest dose patient was on on last admission

## 2022-01-11 NOTE — H&P ADULT - NSHPREVIEWOFSYSTEMS_GEN_ALL_CORE
ROS:    Constitutional: [ ] fevers [ ] chills   HEENT:  [ ] postnasal drip [ ] nasal congestion  CV: [x ] chest pain [ ] orthopnea [ ] palpitations  Resp: [ x] cough [x ] shortness of breath [x ] dyspnea [ ] wheezing   GI: [ ] nausea [ ] vomiting [ ] diarrhea [ ] constipation [ ] abd pain   : [ ] dysuria  [ ] increased urinary frequency  Musculoskeletal: [ ] back pain [ x] myalgias [x ] arthralgias   Skin: [ ] rash [ ] itch  Neurological: [ ] headache [ ] dizziness [ ] syncope   Endocrine: [ ] diabetes [ ] thyroid problem  Hematologic/Lymphatic: [ ] anemia [ ] bleeding problem  [x ] All other systems negative

## 2022-01-11 NOTE — H&P ADULT - NSHPLABSRESULTS_GEN_ALL_CORE
I have personally reviewed this patient's labs below:                        14.6   10.50 )-----------( 262      ( 10 Charles 2022 19:59 )             46.9     01-10-22 @ 19:59    141  |  98  |  30<H>             --------------------------< 53<LL>     4.0  |  28  | 1.05    eGFR AA: 85  eGFR N-AA: 73    Calcium: 9.2  Phosphorus: --  Magnesium: 2.30    AST: 265<H>    ALT: 139<H>  AlkPhos: 97  Protein: 7.5  Albumin: 3.3  TBili: 0.9  D-Bili: --    VBG - ( 10 Charles 2022 19:59 )  pH: 7.41  /  pCO2: 56    /  pO2: 32    / HCO3: 36    / Base Excess: 8.7   /  SvO2: 45.5  / Lactate: 2.2        Troponin 26--25  Probnp 3562  I have personally reviewed this patient's EKG and my independent interpretation is Afib with PVCs, no ST depressions or elevations    Imaging reviewed below:  CXR:  IMPRESSION:    Bilateral prominent vascular markings and airspace opacities, likely   representing pulmonary edema.    Summary of old records:  TTE 7/10/2021:  CONCLUSIONS:  1. Mitral annular calcification. Mild mitral regurgitation.    2. Calcified trileaflet aortic valve with decreased  opening. Peak transaortic valve gradient equals 43 mm Hg,  mean transaortic valve gradient equals 26 mm Hg, estimated  aortic valve area equals 1.5 sqcm (by continuity equation),  consistent with moderate aortic stenosis.  3. Aortic Root: 3.6 cm.    4. Normal left atrium.  5. Mild concentric left ventricular hypertrophy.  6. SEvere segmental left ventricular systolic  dysfunction.There is a 8x7 cm baseal  inferior wall  aneurysmal  and akinetic with 4.7x 3.7 cm thrombus.The  remainder of the LV is hypokinetic, EF30-35%.  7. Grade II diastolic dysfunction.  8. Normal right atrium.  9. Normal right ventricular size and function.  10. Normal tricuspid valve.  11. There is mild pulmonic regurgitation.  12. Normal pericardium with no pericardial effusion.

## 2022-01-11 NOTE — H&P ADULT - PROBLEM SELECTOR PLAN 8
Warfarin for AC  DASH/TLC CC fluid restriction Moderately hemolyzed  -repeat in AM  -if LFTs not improved consider RUQ US. No abdominal tenderness at this time. Abdomen benign

## 2022-01-11 NOTE — CONSULT NOTE ADULT - SUBJECTIVE AND OBJECTIVE BOX
HPI:  66M with PMH of DM, HTN, HLD, HFrEF 30%, LV thrombus since 2017, Afib on warfarin presenting with SOB x3 days, admitted with COVID PNA.     Consulted for: Uncontrolled T2DM    Diabetes History:   Most recent A1c 10.9%  -Diagnosed   -Endocrinologist  -Current Regimen  -Past Regimens  -FS at home  -Diet  -Complications/Diabetes HCM  -Hospitalizations for DKA/Diabetes?       PAST MEDICAL & SURGICAL HISTORY:  Hypertension    Hyperlipidemia    Heart problem    DM (diabetes mellitus)    LV (left ventricular) mural thrombus    No significant past surgical history        FAMILY HISTORY:  FH: type 2 diabetes (Mother)        Social History:    Outpatient Medications:    MEDICATIONS  (STANDING):  albuterol/ipratropium for Nebulization 3 milliLiter(s) Nebulizer every 6 hours  chlorhexidine 2% Cloths 1 Application(s) Topical daily  dextrose 40% Gel 15 Gram(s) Oral once  dextrose 5%. 1000 milliLiter(s) (50 mL/Hr) IV Continuous <Continuous>  dextrose 5%. 1000 milliLiter(s) (100 mL/Hr) IV Continuous <Continuous>  dextrose 50% Injectable 25 Gram(s) IV Push once  dextrose 50% Injectable 12.5 Gram(s) IV Push once  dextrose 50% Injectable 25 Gram(s) IV Push once  furosemide    Tablet 40 milliGRAM(s) Oral two times a day  glucagon  Injectable 1 milliGRAM(s) IntraMuscular once  insulin lispro (ADMELOG) corrective regimen sliding scale   SubCutaneous three times a day before meals  insulin lispro (ADMELOG) corrective regimen sliding scale   SubCutaneous at bedtime  isosorbide   mononitrate ER Tablet (IMDUR) 30 milliGRAM(s) Oral daily  metoprolol succinate ER 25 milliGRAM(s) Oral daily  pantoprazole    Tablet 40 milliGRAM(s) Oral before breakfast  sacubitril 24 mG/valsartan 26 mG 1 Tablet(s) Oral two times a day    MEDICATIONS  (PRN):  acetaminophen     Tablet .. 650 milliGRAM(s) Oral every 6 hours PRN Temp greater or equal to 38C (100.4F), Mild Pain (1 - 3)      Allergies    No Known Allergies    Intolerances      Review of Systems:  Constitutional: No fever  Eyes: No blurry vision  Neuro: No tremors  HEENT: No pain  Cardiovascular: No chest pain, palpitations  Respiratory: No SOB, no cough  GI: No nausea, vomiting, abdominal pain  : No dysuria  Skin: no rash  Psych: no depression  Endocrine: no polyuria, polydipsia  Hem/lymph: no swelling  Osteoporosis: no fractures    PHYSICAL EXAM:  VITALS: T(C): 36.7 (01-11-22 @ 10:46)  T(F): 98.1 (01-11-22 @ 10:46), Max: 98.9 (01-11-22 @ 06:16)  HR: 76 (01-11-22 @ 10:46) (73 - 86)  BP: 139/80 (01-11-22 @ 10:46) (131/85 - 145/91)  RR:  (18 - 21)  SpO2:  (98% - 100%)  Wt(kg): --  GENERAL: NAD, well-groomed, well-developed  EYES: No proptosis, no lid lag, anicteric  HEENT:  Atraumatic, Normocephalic, moist mucous membranes  THYROID: Normal size, no palpable nodules  RESPIRATORY: Clear to auscultation bilaterally; No rales, rhonchi, wheezing  CARDIOVASCULAR: Regular rate and rhythm; No murmurs; no peripheral edema  GI: Soft, nontender, non distended, normal bowel sounds  SKIN: Dry, intact, No rashes or lesions  MUSCULOSKELETAL: Full range of motion, normal strength  NEURO: sensation intact, extraocular movements intact, no tremor  PSYCH: Alert and oriented x 3, normal affect, normal mood  CUSHING'S SIGNS: no striae    POCT Blood Glucose.: 369 mg/dL (01-11-22 @ 12:10)  POCT Blood Glucose.: 293 mg/dL (01-11-22 @ 08:30)  POCT Blood Glucose.: 108 mg/dL (01-10-22 @ 22:40)  POCT Blood Glucose.: 55 mg/dL (01-10-22 @ 20:10)  POCT Blood Glucose.: 97 mg/dL (01-10-22 @ 17:11)                            14.6   10.50 )-----------( 262      ( 10 Charles 2022 19:59 )             46.9       01-11    137  |  95<L>  |  30<H>  ----------------------------<  322<H>  4.2   |  28  |  0.94    EGFR if : 97  EGFR if non : 84    Ca    8.8      01-11  Mg     2.10     01-11  Phos  4.6     01-11    TPro  7.2  /  Alb  3.4  /  TBili  0.7  /  DBili  x   /  AST  169<H>  /  ALT  130<H>  /  AlkPhos  98  01-11      Thyroid Function Tests:      A1C with Estimated Average Glucose Result: 10.9 % (01-10-22 @ 19:59)  A1C with Estimated Average Glucose Result: 8.8 % (07-09-21 @ 09:43)          Radiology:                HPI:  66M with PMH of DM, HTN, HLD, HFrEF 30%, LV thrombus since 2017, Afib on warfarin presenting with SOB x3 days, admitted with COVID PNA.     Consulted for: Uncontrolled T2DM    History obtained from patient using [employer] : #[id number]    Diabetes History:   Most recent A1c 10.9%  -Diagnosed 5 years ago  -Endocrinologist - No managed by PCP  -Current Regimen - Farxiga 5 mg daily, Glyburide 5 mg BID, and Metformin 1000 mg BID. Patient is intermittently on Prednisone for COPD  -FS at home - FS range from 130-180. Never low.  -Diet- average  -Complications/Diabetes HCM - UTD with optho, no retinopathy. Denies neuropathy. No nephropathy. GFR 84. (+) MI with HFrEF  -Hospitalizations for DKA/Diabetes?       PAST MEDICAL & SURGICAL HISTORY:  Hypertension  Hyperlipidemia  MI  T2DM (diabetes mellitus)  LV (left ventricular) mural thrombus  No significant past surgical history      FAMILY HISTORY:  FH: type 2 diabetes (Mother)    Social History: No active tobacco use    Outpatient Medications:  · 	isosorbide mononitrate 30 mg oral tablet, extended release: Last Dose Taken:  , 1 tab(s) orally once a day  · 	metFORMIN 1000 mg oral tablet: Last Dose Taken:  , 1 tab(s) orally 2 times a day  · 	Lasix 40 mg oral tablet: Last Dose Taken:  , 1 tab(s) orally 2 times a day  · 	Farxiga 5 mg oral tablet: Last Dose Taken:  , 1 tab(s) orally once a day  · 	metoprolol succinate 25 mg oral tablet, extended release: Last Dose Taken:  , 1 tab(s) orally once a day  · 	Coumadin 4 mg oral tablet: Last Dose Taken:  , 1 tab(s) orally once a day 4mg on M-Fri, 3mg Sat and Sun  · 	glyburide 5 mg oral tablet: Last Dose Taken:  , 1 tab(s) orally 2 times a day  · 	omeprazole 40 mg oral delayed release capsule: Last Dose Taken:  , 1 cap(s) orally once a day  · 	Entresto 24 mg-26 mg oral tablet: Last Dose Taken:  , 1 tab(s) orally 2 times a day      MEDICATIONS  (STANDING):  albuterol/ipratropium for Nebulization 3 milliLiter(s) Nebulizer every 6 hours  chlorhexidine 2% Cloths 1 Application(s) Topical daily  dextrose 40% Gel 15 Gram(s) Oral once  dextrose 5%. 1000 milliLiter(s) (50 mL/Hr) IV Continuous <Continuous>  dextrose 5%. 1000 milliLiter(s) (100 mL/Hr) IV Continuous <Continuous>  dextrose 50% Injectable 25 Gram(s) IV Push once  dextrose 50% Injectable 12.5 Gram(s) IV Push once  dextrose 50% Injectable 25 Gram(s) IV Push once  furosemide    Tablet 40 milliGRAM(s) Oral two times a day  glucagon  Injectable 1 milliGRAM(s) IntraMuscular once  insulin lispro (ADMELOG) corrective regimen sliding scale   SubCutaneous three times a day before meals  insulin lispro (ADMELOG) corrective regimen sliding scale   SubCutaneous at bedtime  isosorbide   mononitrate ER Tablet (IMDUR) 30 milliGRAM(s) Oral daily  metoprolol succinate ER 25 milliGRAM(s) Oral daily  pantoprazole    Tablet 40 milliGRAM(s) Oral before breakfast  sacubitril 24 mG/valsartan 26 mG 1 Tablet(s) Oral two times a day    MEDICATIONS  (PRN):  acetaminophen     Tablet .. 650 milliGRAM(s) Oral every 6 hours PRN Temp greater or equal to 38C (100.4F), Mild Pain (1 - 3)      Allergies    No Known Allergies    Intolerances      Review of Systems:  Constitutional: No fever; low appetite  Eyes: No blurry vision  Neuro: No tremors  HEENT: No pain  Cardiovascular: No chest pain, palpitations  Respiratory: (+) SOB and cough  GI: No nausea, vomiting, abdominal pain  : No dysuria  Skin: no rash  Psych: no depression  Endocrine: no polyuria, polydipsia  Hem/lymph: no swelling  Osteoporosis: no fractures    PHYSICAL EXAM:  VITALS: T(C): 36.7 (01-11-22 @ 10:46)  T(F): 98.1 (01-11-22 @ 10:46), Max: 98.9 (01-11-22 @ 06:16)  HR: 76 (01-11-22 @ 10:46) (73 - 86)  BP: 139/80 (01-11-22 @ 10:46) (131/85 - 145/91)  RR:  (18 - 21)  SpO2:  (98% - 100%)  Wt(kg): --  GENERAL: NAD, obese  THYROID: Normal size, no palpable nodules  RESPIRATORY: Clear to auscultation bilaterally  CARDIOVASCULAR: Regular rate  GI: Soft, nontender, non distended  EXT: 2+ pulses  PSYCH: Alert and oriented x 3, reactive mood      POCT Blood Glucose.: 369 mg/dL (01-11-22 @ 12:10)  POCT Blood Glucose.: 293 mg/dL (01-11-22 @ 08:30)  POCT Blood Glucose.: 108 mg/dL (01-10-22 @ 22:40)  POCT Blood Glucose.: 55 mg/dL (01-10-22 @ 20:10)  POCT Blood Glucose.: 97 mg/dL (01-10-22 @ 17:11)                            14.6   10.50 )-----------( 262      ( 10 Charles 2022 19:59 )             46.9       01-11    137  |  95<L>  |  30<H>  ----------------------------<  322<H>  4.2   |  28  |  0.94    EGFR if : 97  EGFR if non : 84    Ca    8.8      01-11  Mg     2.10     01-11  Phos  4.6     01-11    TPro  7.2  /  Alb  3.4  /  TBili  0.7  /  DBili  x   /  AST  169<H>  /  ALT  130<H>  /  AlkPhos  98  01-11      Thyroid Function Tests:      A1C with Estimated Average Glucose Result: 10.9 % (01-10-22 @ 19:59)  A1C with Estimated Average Glucose Result: 8.8 % (07-09-21 @ 09:43)          Radiology:

## 2022-01-11 NOTE — H&P ADULT - PROBLEM SELECTOR PLAN 5
On metformin 1g BID, glipizide 10mg BID, farxiga 5mg qd - hold inpatietn  low dose ISS for now given hypoglycemia to 50s. Now   FSG may elevated while on steroids and may require some lantus on admission. Will closely monitor FSGs and adjust as appropriate  Add on A1C Hx LV thrombus on echo 7.2021. On warfarin. Per daughter this has been known since 2017 and follows with cardiology Dr. Gonzalez  -echo ordered  -warfarin adjustment as above

## 2022-01-11 NOTE — PROGRESS NOTE ADULT - PROBLEM SELECTOR PLAN 8
Moderately hemolyzed  -repeat in AM  -if LFTs not improved consider RUQ US. No abdominal tenderness at this time. Abdomen benign DVT Prophylaxis:warfarin, full AC for a-fib  Diet: DASH  Lines/Tubes: peripheral IVs only

## 2022-01-11 NOTE — PROGRESS NOTE ADULT - PROBLEM SELECTOR PLAN 5
Hx LV thrombus on echo 7.2021. On warfarin. Per daughter this has been known since 2017 and follows with cardiology Dr. Gonzalez  -echo ordered  -warfarin adjustment as above On metformin 1g BID, glipizide 10mg BID, farxiga 5mg qd - hold inpatietn  - hypoglycemis to 50s on admission but 322 this morning, prednisone may be contributing to hyperglycemia  - A1C >10, diabetes uncontrolled will consult endo and put on insulin regimen  - 0.4u/kg = 40 units daily, will start with 20u lantus and 7u premeal

## 2022-01-11 NOTE — H&P ADULT - NSICDXPASTMEDICALHX_GEN_ALL_CORE_FT
PAST MEDICAL HISTORY:  DM (diabetes mellitus)     Heart problem     Hyperlipidemia     Hypertension     LV (left ventricular) mural thrombus

## 2022-01-11 NOTE — PROGRESS NOTE ADULT - PROBLEM SELECTOR PLAN 2
Wheezing on exam and CO2 elevation on VBG. Possibly underlying COPD history.   -standing nebs, steroids  -f/u COVID swab - positive Mild LE edema somewhat worsened per patient. S/p 40 IV lasix in ED.   - Patient does not feel significantly better after lasix, for now will only continue maintenance home dose of  PO qd  - More likely COVID as contributor to symptoms, Not grossly volume overloaded and radiology comments CXR more consistent with COVID instead of pulmonary edema  - Continue home metoprolol, entresto - confirm home dose, lasix 40 BID

## 2022-01-11 NOTE — H&P ADULT - PROBLEM SELECTOR PLAN 7
Warfarin for AC  DASH/TLC CC fluid restriction Moderately hemolyzed  -repeat in AM  -if LFTs not improved consider RUQ US. No abdominal tenderness at this time. Abdomen benign Resume metoprolol  Confirm entresto dose - daughter confirms he is on but does not know dose. Resuming lowest dose patient was on on last admission

## 2022-01-11 NOTE — H&P ADULT - PROBLEM SELECTOR PLAN 3
Afib on EKG, rate controlled  -subtherapeutic INR 1.42  -on warfarin 4mg on M-Fri, 3mg Sat-sunday  -will give 4.5mg today and check daily INR  -resume metoprolol Mild LE edema somewhat worsened per patient. S/p 40 IV lasix in ED. Will closely monitor volume status and hold off on further IV diuresis. More likely COVID and COPD as contributing to SOB than CHF. Not grossly overloaded. Wheezing more so heard on exam than crackles.  -echo ordered  - resume metoprolol, entresto - confirm home dose, lasix 40 BID

## 2022-01-11 NOTE — H&P ADULT - PROBLEM SELECTOR PLAN 2
Wheezing on exam and CO2 elevation on VBG. Possibly underlying COPD history.   -standing nebs, steroids Wheezing on exam and CO2 elevation on VBG. Possibly underlying COPD history.   -standing nebs, steroids  -f/u COVID swab - add on RVP Wheezing on exam and CO2 elevation on VBG. Possibly underlying COPD history.   -standing nebs, steroids  -f/u COVID swab - positive

## 2022-01-11 NOTE — H&P ADULT - ASSESSMENT
66M with PMH of DM, HTN, HLD, HFrEF 30%, LV thrombus since 2017, Afib on warfarin presenting with SOB x3 days. Admitted with likely CHF vs COPD exacerbation

## 2022-01-11 NOTE — CONSULT NOTE ADULT - PROBLEM SELECTOR PROBLEM 2
Left message for patient with directions on where to purchase pre-surgical scrub.     Will forward message to RN to return patient's call with status of [place] paperwork.    HTN (hypertension)

## 2022-01-12 ENCOUNTER — TRANSCRIPTION ENCOUNTER (OUTPATIENT)
Age: 67
End: 2022-01-12

## 2022-01-12 DIAGNOSIS — R05.9 COUGH, UNSPECIFIED: ICD-10-CM

## 2022-01-12 LAB
ALBUMIN SERPL ELPH-MCNC: 2.9 G/DL — LOW (ref 3.3–5)
ALP SERPL-CCNC: 95 U/L — SIGNIFICANT CHANGE UP (ref 40–120)
ALT FLD-CCNC: 115 U/L — HIGH (ref 4–41)
ANION GAP SERPL CALC-SCNC: 12 MMOL/L — SIGNIFICANT CHANGE UP (ref 7–14)
AST SERPL-CCNC: 98 U/L — HIGH (ref 4–40)
BILIRUB SERPL-MCNC: 0.5 MG/DL — SIGNIFICANT CHANGE UP (ref 0.2–1.2)
BUN SERPL-MCNC: 38 MG/DL — HIGH (ref 7–23)
CALCIUM SERPL-MCNC: 8.7 MG/DL — SIGNIFICANT CHANGE UP (ref 8.4–10.5)
CHLORIDE SERPL-SCNC: 104 MMOL/L — SIGNIFICANT CHANGE UP (ref 98–107)
CO2 SERPL-SCNC: 24 MMOL/L — SIGNIFICANT CHANGE UP (ref 22–31)
CREAT SERPL-MCNC: 0.97 MG/DL — SIGNIFICANT CHANGE UP (ref 0.5–1.3)
GLUCOSE SERPL-MCNC: 205 MG/DL — HIGH (ref 70–99)
HCT VFR BLD CALC: 45.5 % — SIGNIFICANT CHANGE UP (ref 39–50)
HGB BLD-MCNC: 14.2 G/DL — SIGNIFICANT CHANGE UP (ref 13–17)
MAGNESIUM SERPL-MCNC: 2 MG/DL — SIGNIFICANT CHANGE UP (ref 1.6–2.6)
MCHC RBC-ENTMCNC: 28.9 PG — SIGNIFICANT CHANGE UP (ref 27–34)
MCHC RBC-ENTMCNC: 31.2 GM/DL — LOW (ref 32–36)
MCV RBC AUTO: 92.7 FL — SIGNIFICANT CHANGE UP (ref 80–100)
NRBC # BLD: 0 /100 WBCS — SIGNIFICANT CHANGE UP
NRBC # FLD: 0 K/UL — SIGNIFICANT CHANGE UP
PHOSPHATE SERPL-MCNC: 2.7 MG/DL — SIGNIFICANT CHANGE UP (ref 2.5–4.5)
PLATELET # BLD AUTO: 286 K/UL — SIGNIFICANT CHANGE UP (ref 150–400)
POTASSIUM SERPL-MCNC: 3.6 MMOL/L — SIGNIFICANT CHANGE UP (ref 3.5–5.3)
POTASSIUM SERPL-SCNC: 3.6 MMOL/L — SIGNIFICANT CHANGE UP (ref 3.5–5.3)
PROT SERPL-MCNC: 6.9 G/DL — SIGNIFICANT CHANGE UP (ref 6–8.3)
RBC # BLD: 4.91 M/UL — SIGNIFICANT CHANGE UP (ref 4.2–5.8)
RBC # FLD: 14.2 % — SIGNIFICANT CHANGE UP (ref 10.3–14.5)
SODIUM SERPL-SCNC: 140 MMOL/L — SIGNIFICANT CHANGE UP (ref 135–145)
WBC # BLD: 7.12 K/UL — SIGNIFICANT CHANGE UP (ref 3.8–10.5)
WBC # FLD AUTO: 7.12 K/UL — SIGNIFICANT CHANGE UP (ref 3.8–10.5)

## 2022-01-12 PROCEDURE — 99239 HOSP IP/OBS DSCHRG MGMT >30: CPT | Mod: GC

## 2022-01-12 PROCEDURE — 99232 SBSQ HOSP IP/OBS MODERATE 35: CPT

## 2022-01-12 RX ORDER — ATORVASTATIN CALCIUM 80 MG/1
1 TABLET, FILM COATED ORAL
Qty: 0 | Refills: 0 | DISCHARGE

## 2022-01-12 RX ORDER — APIXABAN 2.5 MG/1
1 TABLET, FILM COATED ORAL
Qty: 0 | Refills: 0 | DISCHARGE

## 2022-01-12 RX ORDER — INSULIN LISPRO 100/ML
10 VIAL (ML) SUBCUTANEOUS
Refills: 0 | Status: DISCONTINUED | OUTPATIENT
Start: 2022-01-12 | End: 2022-01-13

## 2022-01-12 RX ORDER — SACUBITRIL AND VALSARTAN 24; 26 MG/1; MG/1
1 TABLET, FILM COATED ORAL
Qty: 0 | Refills: 0 | DISCHARGE

## 2022-01-12 RX ORDER — POLYETHYLENE GLYCOL 3350 17 G/17G
17 POWDER, FOR SOLUTION ORAL ONCE
Refills: 0 | Status: COMPLETED | OUTPATIENT
Start: 2022-01-12 | End: 2022-01-12

## 2022-01-12 RX ORDER — FUROSEMIDE 40 MG
1 TABLET ORAL
Qty: 0 | Refills: 0 | DISCHARGE

## 2022-01-12 RX ORDER — PANTOPRAZOLE SODIUM 20 MG/1
1 TABLET, DELAYED RELEASE ORAL
Qty: 0 | Refills: 0 | DISCHARGE

## 2022-01-12 RX ORDER — ISOSORBIDE MONONITRATE 60 MG/1
1 TABLET, EXTENDED RELEASE ORAL
Qty: 0 | Refills: 0 | DISCHARGE

## 2022-01-12 RX ORDER — ALBUTEROL 90 UG/1
1 AEROSOL, METERED ORAL
Refills: 0 | Status: DISCONTINUED | OUTPATIENT
Start: 2022-01-12 | End: 2022-01-13

## 2022-01-12 RX ORDER — INSULIN GLARGINE 100 [IU]/ML
22 INJECTION, SOLUTION SUBCUTANEOUS
Refills: 0 | Status: DISCONTINUED | OUTPATIENT
Start: 2022-01-13 | End: 2022-01-13

## 2022-01-12 RX ORDER — DULAGLUTIDE 4.5 MG/.5ML
0.75 INJECTION, SOLUTION SUBCUTANEOUS
Qty: 2.5 | Refills: 0
Start: 2022-01-12 | End: 2022-01-16

## 2022-01-12 RX ORDER — TIOTROPIUM BROMIDE 18 UG/1
1 CAPSULE ORAL; RESPIRATORY (INHALATION) DAILY
Refills: 0 | Status: DISCONTINUED | OUTPATIENT
Start: 2022-01-12 | End: 2022-01-13

## 2022-01-12 RX ORDER — METOPROLOL TARTRATE 50 MG
1 TABLET ORAL
Qty: 0 | Refills: 0 | DISCHARGE

## 2022-01-12 RX ORDER — SEMAGLUTIDE 0.68 MG/ML
0.25 INJECTION, SOLUTION SUBCUTANEOUS
Qty: 0.75 | Refills: 0
Start: 2022-01-12 | End: 2022-01-16

## 2022-01-12 RX ORDER — INFLUENZA VIRUS VACCINE 15; 15; 15; 15 UG/.5ML; UG/.5ML; UG/.5ML; UG/.5ML
0.7 SUSPENSION INTRAMUSCULAR ONCE
Refills: 0 | Status: COMPLETED | OUTPATIENT
Start: 2022-01-12 | End: 2022-01-12

## 2022-01-12 RX ADMIN — Medication 2: at 08:47

## 2022-01-12 RX ADMIN — Medication 2: at 17:21

## 2022-01-12 RX ADMIN — Medication 7 UNIT(S): at 12:40

## 2022-01-12 RX ADMIN — INSULIN GLARGINE 20 UNIT(S): 100 INJECTION, SOLUTION SUBCUTANEOUS at 13:07

## 2022-01-12 RX ADMIN — APIXABAN 5 MILLIGRAM(S): 2.5 TABLET, FILM COATED ORAL at 05:36

## 2022-01-12 RX ADMIN — SACUBITRIL AND VALSARTAN 1 TABLET(S): 24; 26 TABLET, FILM COATED ORAL at 17:21

## 2022-01-12 RX ADMIN — PANTOPRAZOLE SODIUM 40 MILLIGRAM(S): 20 TABLET, DELAYED RELEASE ORAL at 05:36

## 2022-01-12 RX ADMIN — Medication 40 MILLIGRAM(S): at 05:36

## 2022-01-12 RX ADMIN — CHLORHEXIDINE GLUCONATE 1 APPLICATION(S): 213 SOLUTION TOPICAL at 08:47

## 2022-01-12 RX ADMIN — Medication 7 UNIT(S): at 08:47

## 2022-01-12 RX ADMIN — Medication 40 MILLIGRAM(S): at 17:21

## 2022-01-12 RX ADMIN — SACUBITRIL AND VALSARTAN 1 TABLET(S): 24; 26 TABLET, FILM COATED ORAL at 05:36

## 2022-01-12 RX ADMIN — APIXABAN 5 MILLIGRAM(S): 2.5 TABLET, FILM COATED ORAL at 17:21

## 2022-01-12 RX ADMIN — Medication 10 UNIT(S): at 17:21

## 2022-01-12 RX ADMIN — Medication 25 MILLIGRAM(S): at 05:36

## 2022-01-12 RX ADMIN — POLYETHYLENE GLYCOL 3350 17 GRAM(S): 17 POWDER, FOR SOLUTION ORAL at 08:50

## 2022-01-12 RX ADMIN — Medication 4: at 12:40

## 2022-01-12 NOTE — PROGRESS NOTE ADULT - PROBLEM SELECTOR PLAN 4
Hx LV thrombus on echo 7/2021. On warfarin. Per daughter this has been known since 2017 and follows with cardiology Dr. Gonzalez  -echo ordered  -warfarin adjustment as above Hx LV thrombus on echo 7/2021. On warfarin. Per daughter this has been known since 2017 and follows with cardiology Dr. Gonzalez  - 1/12 TTE unchanged from prior, no EF% given but severe segmental LV dysfunction with thrombus  - Pt is not actually on warfarin, there has been a lot of difference in the med list on pt's phone, the meds the daughter has confirmed, what the pharmacy has for the patient and what he is truly on. Today it was confirmed with pt cardiologist that he is on Eliquis not warfarin  - During time here he only received one 4.5mg dose of warfarin  - Restart Eliquis 5mg BID

## 2022-01-12 NOTE — PROGRESS NOTE ADULT - PROBLEM SELECTOR PLAN 6
- BPs 108/70 - 113/80 overnight, well controlled  - Continue home metoprolol and entresto - BPs 108/70 - 145/91 overnight, well controlled  - Continue home metoprolol succ 50mg qd and Entresto 49/51 BID

## 2022-01-12 NOTE — PHARMACOTHERAPY INTERVENTION NOTE - COMMENTS
Deny  #[id number]. Pt educated on A1c, basal/bolus insulin pen administration, hypoglycemia and treatment, healthy plate, goal BG, and when to check BG, pt with good return demo and verbalized understanding. Pt needed prompting to prime the pen but otherwise showed good technique. Counseled pt on where to inject insulin (abdomen), rotating injection site to prevent lipodystrophy, proper insulin storage, when to administer basal and bolus insulin (when to hold bolus insulin), and sharps disposal. Patient's daughter also called to explain discharge plan (states she knows how to use an insulin pen as well - discussed basal and bolus insulin). Patient and daughter state his insurance is inactive but he is in the process of reinstating it. Endocrine and resident aware - will use [employer] coupon so patient can get basal/bolus insulin. Patient needs One Touch test strips. Pt encouraged for outpt f/u with medicine and endocrine.    ID: [id number]  Group: [id number]   BIN: [id number]  PCN: [id number]  (Basaglar and Humalog Pens - should be $35 each)

## 2022-01-12 NOTE — PROGRESS NOTE ADULT - SUBJECTIVE AND OBJECTIVE BOX
Samantha Robertson MD  EM/IM PGY-1  Pager # [phone]  --------------------------------  ==============UNFINISHED NOTE==================   Samantha Robertson MD  EM/IM PGY-1  Pager # [phone]  --------------------------------  OVERNIGHT: No events, vitals stable. Initiated basal-bolus Insulin regimen last night      SUBJECTIVE: Feels weak still, but no SOB at rest or with moderate movement around room. Feels constipated he has not had a BM in 2 days which is abnormal for him. His throat is intermittently sore and feels dry. Denies chest pain, abdominal pain, vomiting/nausea.      PHYSICAL EXAM:  GENERAL: Sitting comfortably in bed in no acute distress  NEURO: Alert and Oriented to person, place, date and situation. Pupils symmetric, No ptosis. No facial asymmetry or dysarthria, no tremor noted.  HEENT: No conjunctival injection or scleral icterus.   CARD: Regular rate and rhythm, no murmurs or gallops appreciated.  RESP: Inspiratory and expiratory low pitched wheezing diffusely,  Good respiratory effort.  ABD: Bowel sounds active. Distended obese abdomen, Soft and nontender to palpation in all quadrants, no guarding, no rigidity. No masses appreciated.  EXT: No pedal edema. 2+DP pulses bilaterally.  SKIN: No rashes, bruising or acute skin injuries on face, limbs, abdomen, chest or back    Vital Signs Last 24 Hrs  T(C): 36.3 (12 Jan 2022 12:40), Max: 36.9 (11 Jan 2022 21:30)  T(F): 97.4 (12 Jan 2022 12:40), Max: 98.4 (11 Jan 2022 21:30)  HR: 66 (12 Jan 2022 12:40) (66 - 82)  BP: 108/64 (12 Jan 2022 12:40) (108/64 - 113/68)  BP(mean): --  RR: 18 (12 Jan 2022 12:40) (17 - 18)  SpO2: 97% (12 Jan 2022 12:40) (94% - 97%)    .  LABS:                         14.2   7.12  )-----------( 286      ( 12 Jan 2022 09:01 )             45.5     01-12    140  |  104  |  38<H>  ----------------------------<  205<H>  3.6   |  24  |  0.97    Ca    8.7      12 Jan 2022 09:01  Phos  2.7     01-12  Mg     2.00     01-12    TPro  6.9  /  Alb  2.9<L>  /  TBili  0.5  /  DBili  x   /  AST  98<H>  /  ALT  115<H>  /  AlkPhos  95  01-12    PT/INR - ( 10 Charles 2022 22:57 )   PT: 16.1 sec;   INR: 1.42 ratio         PTT - ( 10 Charles 2022 22:57 )  PTT:33.2 sec

## 2022-01-12 NOTE — PROGRESS NOTE ADULT - PROBLEM SELECTOR PLAN 3
Afib on EKG on admission, rate controlled  - subtherapeutic INR 1.42 2200 1/10, given 4.5mg 0000 1/11, 5mg 0000 1/12  - on warfarin 4mg on M-Fri, 3mg Sat-sunday  - Check daily INR  - Continue home metoprolol Afib on EKG on admission, rate controlled  - subtherapeutic INR 1.42 2200 1/10, given 4.5mg 0000 1/11, 5mg 0000 1/12  - on warfarin 4mg on M-Fri, 3mg Sat-sunday  - Check daily INR  - Continue home metoprolol succ 25mg qd

## 2022-01-12 NOTE — PROGRESS NOTE ADULT - PROBLEM SELECTOR PLAN 2
- 7/2021 TTE: 30%EF, moderate aortic stenosis, Mild concentric LVH, Severe segmental LV systolic  dysfunction  - S/p 40 IV lasix in ED  - Patient did not feel significantly better after initial lasix dosing, for now will only continue maintenance home dose of PO 40mg BID  - More likely COVID as contributor to symptoms, Not grossly volume overloaded and radiology comments CXR more consistent with COVID instead of pulmonary edema  - Continue home metoprolol, entresto, lasix 40 BID - 7/2021 TTE: 30%EF, moderate aortic stenosis, Mild concentric LVH, Severe segmental LV systolic  dysfunction  - S/p 40 IV lasix in ED  - Patient did not feel significantly better after initial lasix dosing, for now will only continue maintenance home dose of PO 40mg BID  - More likely COVID as contributor to symptoms, Not grossly volume overloaded and radiology comments CXR more consistent with COVID instead of pulmonary edema  - Continue home metoprolol 25mg qd, Entresto 49mg-51mg BID, Lasix 40 BID

## 2022-01-12 NOTE — PROGRESS NOTE ADULT - PROBLEM SELECTOR PLAN 8
DVT Prophylaxis:warfarin, full AC for a-fib  Diet: DASH  Lines/Tubes: peripheral IVs only DVT Prophylaxis: Eliquis for a-fib  Diet: DASH  Lines/Tubes: peripheral IVs only

## 2022-01-12 NOTE — PROGRESS NOTE ADULT - PROBLEM SELECTOR PLAN 7
- slightly elevated, but downtrending  - Pt with infection and in setting of CHF - Slightly elevated, but downtrending daily  - Pt with infection and in setting of CHF, no hx liver disease

## 2022-01-12 NOTE — DISCHARGE NOTE PROVIDER - HOSPITAL COURSE
66M with PMH of DM, HTN, HLD, HFrEF 30%, LV thrombus since 2017, Afib on warfarin presenting with SOB x3 days. Pt at baseline is able to ambulate well for up to 30minutes. Over the last three days he has had decreased ET to less than 2 blocks. His chronic cough is unchanged but he has had some sweats and body aches. His wrists and ankles have steady constant pain but denies swelling, sharp pain or difficulty ROM. Pain is actually made better with rest. SOB is made better with rest and sitting down and worse with ambulation. Pt also endorses episode of chest tightness in the upper chest, nonradiating this morning, lasting two minutes. Pain went away on its own. Pt also had some diaphoresis. Denies palpitations today but had two days ago. Pt's cardiologist is Dr. Gonzalez who the daughter believes saw the patient one month ago. Pt is covid vaccinated in March but without a booster. He denies fevers, chills, sore throat, nausea, vomiting and diarrhea. Denies sick contacts of covid. 66M with PMH of DM, HTN, HLD, HFrEF 30%, LV thrombus since 2017, Afib presented on 1/10 with SOB x3 days. Pt at baseline is able to ambulate well for up to 30minutes. Over the prior three days he had decreased ET to less than 2 blocks. SOB was made better with rest and worse with ambulation, felt like he was not able to catch his breath. His chronic cough is unchanged but he has had some sweats and myalgias. Pt also endorsed occasional episodes of chest tightness in the upper chest, nonradiating this morning, lasting two minutes and self resolved. He denied fevers, chills, sore throat, nausea, vomiting and diarrhea. Denies sick contacts of COVID. Pt is COVID vaccinated in March but without a booster. In the ED his vitals signs were stable with temp 97.4, /77, HR 83, O2 sats 99% on RA with RR 19, Pt had wheezing on lung exam. CXR showed peripheral opacity on R side indicative of COVID pneumonia instead of CHF. Labs significant for mild elevation in AST/ALT and (+) COVID. He received 40mg IV Lasix in the ED and was admitted to medicine for management of COVID pneumonia vs CHF exacerbation.     During his stay in the hospital patient was found to have elevated glucose to 322 and A1c of 10.9. Pt reports compliance with metformin, glipizide and Farxiga at home. Insulin regimen was initiated at 20u basal and 7u premeal + SS. Endocrine consulted for teaching, and discharge/follow up plan. Pt had stable vitals and remained afebrile during his hospital stay, was not hypoxic so was not a candidate for Remdesivir or Dexamethasone therapy. Pt to be discharged per Endo recs on GLP-1 agonist and 66M with PMH of DM, HTN, HLD, HFrEF 30%, LV thrombus since 2017, Afib presented on 1/10 with SOB x3 days. Pt at baseline is able to ambulate well for up to 30minutes. Over the prior three days he had decreased exercise tolerance to less than 2 blocks. SOB was made better with rest and worse with ambulation, felt like he was not able to catch his breath. His chronic cough is unchanged but he has had some sweats and myalgias. Pt also endorsed occasional episodes of chest tightness in the upper chest, nonradiating this morning, lasting two minutes and self resolved. He denied fevers, chills, sore throat, nausea, vomiting and diarrhea. Denies sick contacts of COVID. Pt is COVID vaccinated in March but without a booster. In the ED his vitals signs were stable with temp 97.4, /77, HR 83, O2 sats 99% on RA with RR 19, Pt had wheezing on lung exam. CXR showed peripheral opacity on R side indicative of COVID pneumonia instead of CHF. Labs significant for mild elevation in AST/ALT and (+) COVID. He received 40mg IV Lasix in the ED and was admitted to medicine for management of COVID pneumonia vs CHF exacerbation.     During his stay in the hospital patient was found to have elevated glucose to 322 and A1c of 10.9. Pt reports compliance with metformin, glipizide and Farxiga at home. Insulin regimen was initiated at 22u basal and 10u premeal + SS. Endocrine consulted for teaching, and discharge/follow up plan. Pt had stable vitals and remained afebrile during his hospital stay, was not hypoxic so was not a candidate for Remdesivir or Dexamethasone therapy. Pt to be discharged per Endo recs on basal/bolus insulin with follow-up.

## 2022-01-12 NOTE — DISCHARGE NOTE PROVIDER - NSDCCPCAREPLAN_GEN_ALL_CORE_FT
PRINCIPAL DISCHARGE DIAGNOSIS  Diagnosis: 2019 novel coronavirus disease (COVID-19)  Assessment and Plan of Treatment: Vivek dalë pozitiv për COVID-19 dhe vivek bërë një radiografi të gjoksit që tregonte infeksion në mushkëri. Ky ka të ngjarë të jetë shkaku i simptomave tuaja të gulçimit. Shenjat tuaja jetësore kanë mbetur të philippe gjatë qëndrimit tuaj dhe nuk vivek pasur nivele të ulëta oksigjeni. Samuel për samuel ju rajeev të sigurt për të vazhduar shërimin joe ky virus në shtëpi. Nëse simptomat tuaja përkeqësohen, ju lutemi kthehuni në Departamentin e Urgjencës.  You tested positive for COVID-19 and had a chest x-ray that indicated infection in your lung. This is likely the cause of your symptoms of shortness of breath. Your vital signs remained good during your stay, and you did not have low oxygen levels. Right now you are safe to continue recovering from this virus at home. If your symptoms worsen please return to the Emergency Department.      SECONDARY DISCHARGE DIAGNOSES  Diagnosis: Type 2 diabetes mellitus with hyperglycemia, without long-term current use of insulin  Assessment and Plan of Treatment:      PRINCIPAL DISCHARGE DIAGNOSIS  Diagnosis: 2019 novel coronavirus disease (COVID-19)  Assessment and Plan of Treatment: Vivek dalë pozitiv për COVID-19 dhe vivek bërë një radiografi të gjoksit që tregonte infeksion në mushkëri. Ky ka të ngjarë të jetë shkaku i simptomave tuaja të gulçimit. Shenjat tuaja jetësore kanë mbetur të philippe gjatë qëndrimit tuaj dhe nuk vivek pasur nivele të ulëta oksigjeni. Samuel për samuel ju rajeev të sigurt për të vazhduar shërimin joe ky virus në shtëpi. Nëse simptomat tuaja përkeqësohen, ju lutemi kthehuni në Departamentin e Urgjencës.  You tested positive for COVID-19 and had a chest x-ray that indicated infection in your lung. This is likely the cause of your symptoms of shortness of breath. Your vital signs remained good during your stay, and you did not have low oxygen levels. Right now you are safe to continue recovering from this virus at home. If your symptoms worsen please return to the Emergency Department.      SECONDARY DISCHARGE DIAGNOSES  Diagnosis: Type 2 diabetes mellitus with hyperglycemia, without long-term current use of insulin  Assessment and Plan of Treatment: Kur erdhët në spital, zbuluam se nivelet e sheqerit në gjak ishin të ngritura. Ne ndryshuam regjimin tuaj të mjekimit të diabetit dhe do të jetë ndryshe kur të shkoni në shtëpi. Përpara se të hynit, vivek marrë Metformin, Glipizide dhe Farxiga. Kur të largoheni, do të merrni Metformin dhe një stilolaps insulinë. Ju do të ndaloni marrjen e Glipizide. Për shkak të statusit tuaj aktual të sigurimit, Farxiga juaj aktuale do të jetë e shtrenjtë, kështu që është në rregull të ndaloni ta merrni këtë për momentin, ne do ta rifillojmë këtë pasi sigurimi juaj të rifillojë. Ne ju anthony dhënë edukim se si të përdorni stilolapsat e insulinës dhe do të anthony një infermiere që të vijë në shtëpinë tuaj për t'ju ndihmuar të përdorni insulinën. gama [facility] të renditur më poshtë në 1-2 javët e ardhshme.  When you came to the hospital we found that your blood sugar levels were elevated. We changed your diabetes medication regimen and it will be different when you go home. Before you came in you were taking Metformin, Glipizide, and Farxiga. When you are discharged you will be taking Metformin and an insulin pen. You will stop taking the Glipizide. Due to your current insurance status, your current Farxiga will be expensive so it is okay to stop taking this for now, we will restart this once your insurance has restarted. We have given you education on how to use the insulin pens and will have a nurse come to your home to help you to use the insulin. Please follow up with Dr. Paredes and the Endocrinology clinic listed below in the next 1-2 weeks.       PRINCIPAL DISCHARGE DIAGNOSIS  Diagnosis: 2019 novel coronavirus disease (COVID-19)  Assessment and Plan of Treatment: Vivek dalë pozitiv për COVID-19 dhe vivek bërë një radiografi të gjoksit që tregonte infeksion në mushkëri. Ky ka të ngjarë të jetë shkaku i simptomave tuaja të gulçimit. Shenjat tuaja jetësore kanë mbetur të philippe gjatë qëndrimit tuaj dhe nuk vivek pasur nivele të ulëta oksigjeni. Samuel për samuel ju rajeev të sigurt për të vazhduar shërimin joe ky virus në shtëpi. Nëse simptomat tuaja përkeqësohen, ju lutemi kthehuni në Departamentin e Urgjencës.  You tested positive for COVID-19 and had a chest x-ray that indicated infection in your lung. This is likely the cause of your symptoms of shortness of breath. Your vital signs remained good during your stay, and you did not have low oxygen levels. Right now you are safe to continue recovering from this virus at home. If your symptoms worsen please return to the Emergency Department.      SECONDARY DISCHARGE DIAGNOSES  Diagnosis: Type 2 diabetes mellitus with hyperglycemia, without long-term current use of insulin  Assessment and Plan of Treatment: Kur erdhët në spital, zbuluam se nivelet e sheqerit në gjak ishin të ngritura. Ne ndryshuam regjimin tuaj të mjekimit të diabetit dhe do të jetë ndryshe kur të shkoni në shtëpi. Përpara se të hynit, vivek marrë Metformin, Glipizide dhe Farxiga. Kur të largoheni, do të merrni farxiga dhe një stilolaps insulinë. Ju do të ndaloni marrjen e Glipizide dhe metforminës. Për shkak të statusit tuaj aktual të sigurimit, Farxiga juaj aktuale do të jetë e shtrenjtë, kështu që është në rregull të ndaloni marrjen e kësaj pasi t'ju mbarojnë pilulat Farxiga, ne do ta rifillojmë këtë pasi sigurimi juaj të rifillojë. Ne ju anthony dhënë edukim se si të përdorni stilolapsat e insulinës dhe do të anthony një infermiere që të vijë në shtëpinë tuaj për t'ju ndihmuar të përdorni insulinën. gama Duran Dr. klinikën e Endokrinologjisë të renditur më poshtë në 1-2 javët e ardhshme.  When you came to the hospital we found that your blood sugar levels were elevated. We changed your diabetes medication regimen and it will be different when you go home. Before you came in you were taking Metformin, Glipizide, and Farxiga. When you are discharged you will be taking farxiga and an insulin pen. You will stop taking the Glipizide and the metformin. Due to your current insurance status, your current Farxiga will be expensive so it is okay to stop taking this once you run out of farxiga pills, we will restart this once your insurance has restarted. We have given you education on how to use the insulin pens and will have a nurse come to your home to help you to use the insulin. Please follow up with Dr. Paredes and the Endocrinology clinic listed below in the next 1-2 weeks.

## 2022-01-12 NOTE — PROGRESS NOTE ADULT - PROBLEM SELECTOR PLAN 5
On metformin 1g BID, glipizide 10mg BID, farxiga 5mg qd - hold inpatietn  - hypoglycemia to 50s on admission but 322 this morning, prednisone may be contributing to hyperglycemia  - A1C >10, diabetes uncontrolled will consult endo and put on insulin regimen  - 0.4u/kg = 40 units daily, will start with 20u lantus and 7u premeal On metformin 1g BID, glipizide 10mg BID, farxiga 5mg qd - hold inpatient  - Hypoglycemia to 50s on admission but 322 this morning, prednisone may be contributing to hyperglycemia  - A1C 10.9 on arrival, diabetes uncontrolled consulted Endo and started Insulin regimen  - 0.4u/kg = 40 units daily, started with 20u lantus and 7u premeal + SS meals and bedtime  - Endo consulted and would like to stop glipizide, and start a GLP-1 agonist but right now patient is uninsured and is between insurance policies so would not be able to afford these medications or his Farxiga right now  - Will DC with metformin and Insulin 70/30 awaiting recs for dosing

## 2022-01-12 NOTE — DISCHARGE NOTE PROVIDER - NSDCMRMEDTOKEN_GEN_ALL_CORE_FT
atorvastatin 40 mg oral tablet: 1 tab(s) orally once a day  Eliquis 5 mg oral tablet: 1 tab(s) orally 2 times a day  Entresto 49 mg-51 mg oral tablet: 1 tab(s) orally 2 times a day  Farxiga 5 mg oral tablet: 1 tab(s) orally once a day  gabapentin 300 mg oral capsule: 1 cap(s) orally 2 times a day  glipiZIDE 10 mg oral tablet: 1 tab(s) orally 2 times a day  isosorbide mononitrate 60 mg oral tablet, extended release: 1 tab(s) orally once a day (in the morning)  Lasix 40 mg oral tablet: 1 tab(s) orally 2 times a day  metFORMIN 1000 mg oral tablet: 1 tab(s) orally 2 times a day  metoprolol succinate 25 mg oral tablet, extended release: 1 tab(s) orally once a day  pantoprazole 40 mg oral delayed release tablet: 1 tab(s) orally once a day   atorvastatin 40 mg oral tablet: 1 tab(s) orally once a day  Eliquis 5 mg oral tablet: 1 tab(s) orally 2 times a day  Entresto 49 mg-51 mg oral tablet: 1 tab(s) orally 2 times a day  Farxiga 5 mg oral tablet: 1 tab(s) orally once a day  gabapentin 300 mg oral capsule: 1 cap(s) orally 2 times a day  glipiZIDE 10 mg oral tablet: 1 tab(s) orally 2 times a day  isosorbide mononitrate 60 mg oral tablet, extended release: 1 tab(s) orally once a day (in the morning)  Lasix 40 mg oral tablet: 1 tab(s) orally 2 times a day  metFORMIN 1000 mg oral tablet: 1 tab(s) orally 2 times a day  metoprolol succinate 25 mg oral tablet, extended release: 1 tab(s) orally once a day  Ozempic 2 mg/1.5 mL (0.25 mg or 0.5 mg dose) subcutaneous solution: 0.25 milligram(s) subcutaneously once a week   pantoprazole 40 mg oral delayed release tablet: 1 tab(s) orally once a day  Trulicity Pen 0.75 mg/0.5 mL subcutaneous solution: 0.75 milligram(s) subcutaneously once a week    alcohol swabs : Apply topically to affected area 4 times a day   atorvastatin 40 mg oral tablet: 1 tab(s) orally once a day  Basaglar KwikPen 100 units/mL subcutaneous solution: 22 unit(s) subcutaneous once a day (at bedtime)   Eliquis 5 mg oral tablet: 1 tab(s) orally 2 times a day  Entresto 49 mg-51 mg oral tablet: 1 tab(s) orally 2 times a day  Farxiga 5 mg oral tablet: 1 tab(s) orally once a day  glucometer (per patient&#x27;s insurance): Test blood sugars four times a day. Dispense #1 glucometer.  HumaLOG KwikPen 100 units/mL injectable solution: 10 unit(s) injectable 3 times a day   Insulin Pen Needles, 4mm: 1 application subcutaneously 4 times a day. ** Use with insulin pen **   isosorbide mononitrate 60 mg oral tablet, extended release: 1 tab(s) orally once a day (in the morning)  lancets: 1 application subcutaneously 4 times a day   Lasix 40 mg oral tablet: 1 tab(s) orally 2 times a day  metoprolol succinate 25 mg oral tablet, extended release: 1 tab(s) orally once a day  pantoprazole 40 mg oral delayed release tablet: 1 tab(s) orally once a day  test strips (per patient&#x27;s insurance): 1 application subcutaneously 4 times a day. ** Compatible with patient&#x27;s glucometer **

## 2022-01-12 NOTE — PROGRESS NOTE ADULT - PROBLEM SELECTOR PLAN 1
- 1/10 CXR with R sided peripheral opacity consistent with COVID pneumonia  - Pt vaccinated but not boosted, COVID positive 1/10  - O2 saturations overnight % on room air, not a candidate for remdesivir or decadron  - Continue to monitor, symptoms likely from infection rather than CHF exacerbation at this point  - Patient with insignificant smoking history and no diagnosis of COPD, s/p 40mg prednisone dose x1 for possible COPD component but was discontinued  - vitals stable for discharge today - 1/10 CXR with R sided peripheral opacity consistent with COVID pneumonia  - Pt vaccinated but not boosted, COVID positive 1/10  - O2 saturations overnight % on room air, not a candidate for remdesivir or decadron  - Continue to monitor, symptoms likely from infection rather than CHF exacerbation at this point  - Patient with insignificant smoking history and no diagnosis of COPD, s/p 40mg prednisone dose x1 for possible COPD component but was discontinued  - vitals stable for discharge today, pending hyperglycemia regimen confirmation from Endo and insurance confirmation

## 2022-01-12 NOTE — PROGRESS NOTE ADULT - SUBJECTIVE AND OBJECTIVE BOX
Admitted for:    Following for:    Subjective:       MEDICATIONS  (STANDING):  ALBUTerol    90 MICROgram(s) HFA Inhaler 1 Puff(s) Inhalation two times a day  apixaban 5 milliGRAM(s) Oral every 12 hours  chlorhexidine 2% Cloths 1 Application(s) Topical daily  dextrose 40% Gel 15 Gram(s) Oral once  dextrose 5%. 1000 milliLiter(s) (50 mL/Hr) IV Continuous <Continuous>  dextrose 5%. 1000 milliLiter(s) (100 mL/Hr) IV Continuous <Continuous>  dextrose 50% Injectable 25 Gram(s) IV Push once  dextrose 50% Injectable 12.5 Gram(s) IV Push once  dextrose 50% Injectable 25 Gram(s) IV Push once  furosemide    Tablet 40 milliGRAM(s) Oral two times a day  glucagon  Injectable 1 milliGRAM(s) IntraMuscular once  influenza  Vaccine (HIGH DOSE) 0.7 milliLiter(s) IntraMuscular once  insulin glargine Injectable (LANTUS) 20 Unit(s) SubCutaneous <User Schedule>  insulin lispro (ADMELOG) corrective regimen sliding scale   SubCutaneous three times a day before meals  insulin lispro (ADMELOG) corrective regimen sliding scale   SubCutaneous at bedtime  insulin lispro Injectable (ADMELOG) 7 Unit(s) SubCutaneous three times a day before meals  isosorbide   mononitrate ER Tablet (IMDUR) 30 milliGRAM(s) Oral daily  metoprolol succinate ER 25 milliGRAM(s) Oral daily  pantoprazole    Tablet 40 milliGRAM(s) Oral before breakfast  sacubitril 49 mG/valsartan 51 mG 1 Tablet(s) Oral two times a day  tiotropium 18 MICROgram(s) Capsule 1 Capsule(s) Inhalation daily    MEDICATIONS  (PRN):  acetaminophen     Tablet .. 650 milliGRAM(s) Oral every 6 hours PRN Temp greater or equal to 38C (100.4F), Mild Pain (1 - 3)      Allergies    No Known Allergies    Intolerances        Review of Systems:  Constitutional: No fever  Eyes: No blurry vision  Neuro: No tremors  HEENT: No pain  Cardiovascular: No chest pain, palpitations  Respiratory: No SOB, no cough  GI: No nausea, vomiting, abdominal pain  : No dysuria  Skin: no rash  Psych: no depression  Endocrine: no polyuria, polydipsia  Hem/lymph: no swelling  Osteoporosis: no fractures    PHYSICAL EXAM:  VITALS: T(C): 36.6 (01-12-22 @ 05:30)  T(F): 97.9 (01-12-22 @ 05:30), Max: 98.4 (01-11-22 @ 21:30)  HR: 81 (01-12-22 @ 05:30) (78 - 82)  BP: 110/61 (01-12-22 @ 05:30) (108/70 - 113/68)  RR:  (17 - 18)  SpO2:  (94% - 95%)  Wt(kg): --  GENERAL: NAD  EYES: No proptosis, no lid lag, anicteric  HEENT:  Atraumatic  THYROID: Normal size, no palpable nodules  RESPIRATORY: Clear to auscultation bilaterally  CARDIOVASCULAR: Regular rate and rhythm; No murmurs; no peripheral edema  GI: Soft, nontender, non distended, normal bowel sounds  SKIN: Dry  PSYCH: Alert and oriented x 3, flat affect      A1C with Estimated Average Glucose Result: 10.9 % (01-10-22 @ 19:59)  A1C with Estimated Average Glucose Result: 8.8 % (07-09-21 @ 09:43)      POCT Blood Glucose.: 169 mg/dL (01-12-22 @ 08:32)  POCT Blood Glucose.: 242 mg/dL (01-11-22 @ 22:38)  POCT Blood Glucose.: 329 mg/dL (01-11-22 @ 17:18)  POCT Blood Glucose.: 383 mg/dL (01-11-22 @ 14:20)  POCT Blood Glucose.: 420 mg/dL (01-11-22 @ 14:17)  POCT Blood Glucose.: 369 mg/dL (01-11-22 @ 12:10)  POCT Blood Glucose.: 293 mg/dL (01-11-22 @ 08:30)  POCT Blood Glucose.: 108 mg/dL (01-10-22 @ 22:40)  POCT Blood Glucose.: 55 mg/dL (01-10-22 @ 20:10)  POCT Blood Glucose.: 97 mg/dL (01-10-22 @ 17:11)      01-12    140  |  104  |  38<H>  ----------------------------<  205<H>  3.6   |  24  |  0.97    EGFR if : 93  EGFR if non : 80    Ca    8.7      01-12  Mg     2.00     01-12  Phos  2.7     01-12    TPro  6.9  /  Alb  2.9<L>  /  TBili  0.5  /  DBili  x   /  AST  98<H>  /  ALT  115<H>  /  AlkPhos  95  01-12      Thyroid Function Tests:                         Admitted for: COVID PNA    Following for: Uncontrolled T2DM     Subjective: Patient feels well, but has a poor appetite      MEDICATIONS  (STANDING):  ALBUTerol    90 MICROgram(s) HFA Inhaler 1 Puff(s) Inhalation two times a day  apixaban 5 milliGRAM(s) Oral every 12 hours  chlorhexidine 2% Cloths 1 Application(s) Topical daily  dextrose 40% Gel 15 Gram(s) Oral once  dextrose 5%. 1000 milliLiter(s) (50 mL/Hr) IV Continuous <Continuous>  dextrose 5%. 1000 milliLiter(s) (100 mL/Hr) IV Continuous <Continuous>  dextrose 50% Injectable 25 Gram(s) IV Push once  dextrose 50% Injectable 12.5 Gram(s) IV Push once  dextrose 50% Injectable 25 Gram(s) IV Push once  furosemide    Tablet 40 milliGRAM(s) Oral two times a day  glucagon  Injectable 1 milliGRAM(s) IntraMuscular once  influenza  Vaccine (HIGH DOSE) 0.7 milliLiter(s) IntraMuscular once  insulin glargine Injectable (LANTUS) 20 Unit(s) SubCutaneous <User Schedule>  insulin lispro (ADMELOG) corrective regimen sliding scale   SubCutaneous three times a day before meals  insulin lispro (ADMELOG) corrective regimen sliding scale   SubCutaneous at bedtime  insulin lispro Injectable (ADMELOG) 7 Unit(s) SubCutaneous three times a day before meals  isosorbide   mononitrate ER Tablet (IMDUR) 30 milliGRAM(s) Oral daily  metoprolol succinate ER 25 milliGRAM(s) Oral daily  pantoprazole    Tablet 40 milliGRAM(s) Oral before breakfast  sacubitril 49 mG/valsartan 51 mG 1 Tablet(s) Oral two times a day  tiotropium 18 MICROgram(s) Capsule 1 Capsule(s) Inhalation daily    MEDICATIONS  (PRN):  acetaminophen     Tablet .. 650 milliGRAM(s) Oral every 6 hours PRN Temp greater or equal to 38C (100.4F), Mild Pain (1 - 3)      Allergies    No Known Allergies    Intolerances      PHYSICAL EXAM:  VITALS: T(C): 36.6 (01-12-22 @ 05:30)  T(F): 97.9 (01-12-22 @ 05:30), Max: 98.4 (01-11-22 @ 21:30)  HR: 81 (01-12-22 @ 05:30) (78 - 82)  BP: 110/61 (01-12-22 @ 05:30) (108/70 - 113/68)  RR:  (17 - 18)  SpO2:  (94% - 95%)  Wt(kg): --  GENERAL: NAD, obese  THYROID: Normal size, no palpable nodules  RESPIRATORY: Clear to auscultation bilaterally  CARDIOVASCULAR: Regular rate  GI: Soft, nontender, non distended  EXT: 2+ pulses  PSYCH: Alert and oriented x 3, reactive mood        A1C with Estimated Average Glucose Result: 10.9 % (01-10-22 @ 19:59)  A1C with Estimated Average Glucose Result: 8.8 % (07-09-21 @ 09:43)      POCT Blood Glucose.: 169 mg/dL (01-12-22 @ 08:32)  POCT Blood Glucose.: 242 mg/dL (01-11-22 @ 22:38)  POCT Blood Glucose.: 329 mg/dL (01-11-22 @ 17:18)  POCT Blood Glucose.: 383 mg/dL (01-11-22 @ 14:20)  POCT Blood Glucose.: 420 mg/dL (01-11-22 @ 14:17)  POCT Blood Glucose.: 369 mg/dL (01-11-22 @ 12:10)  POCT Blood Glucose.: 293 mg/dL (01-11-22 @ 08:30)  POCT Blood Glucose.: 108 mg/dL (01-10-22 @ 22:40)  POCT Blood Glucose.: 55 mg/dL (01-10-22 @ 20:10)  POCT Blood Glucose.: 97 mg/dL (01-10-22 @ 17:11)      01-12    140  |  104  |  38<H>  ----------------------------<  205<H>  3.6   |  24  |  0.97    EGFR if : 93  EGFR if non : 80    Ca    8.7      01-12  Mg     2.00     01-12  Phos  2.7     01-12    TPro  6.9  /  Alb  2.9<L>  /  TBili  0.5  /  DBili  x   /  AST  98<H>  /  ALT  115<H>  /  AlkPhos  95  01-12      Thyroid Function Tests:

## 2022-01-12 NOTE — DISCHARGE NOTE PROVIDER - NSDCFUADDAPPT_GEN_ALL_CORE_FT
Please follow up with Dr. Paredes, your primary care physician.     Also, please follow up your endocrinologist. Please call [phone] to set up an appointment with them at the Medicine Specialties at [facility] at [facility]-[address] [place] [address].

## 2022-01-12 NOTE — PROGRESS NOTE ADULT - ASSESSMENT
66M with PMH of DM, HTN, HLD, HFrEF 30%, LV thrombus since 2017, Afib on warfarin presenting with SOB x3 days, admitted with COVID PNA.     Consulted for: Uncontrolled T2DM    #Uncontrolled T2DM c/b CAD with HFrEF  A1c 10.9%. Goal < 8%  Inpatient FS goal 100-180. FS above goal   Home regimen: Metformin 1g BID, Farxiga 5 mg daily, Glyburide 5 mg BID  Patient states he has a poor appetite   Recs:   -Lantus 20 units every day at 2pm  -Admelog 7 units qAC TID. HOLD if NPO or not eating  -Start low dose correctional scale qAC and qHS. No need to hold if NPO or not eating  -Consistent carb diet  -FS qAC and qHS  For d/c:   -Metformin 1g BID + Farxiga 10 mg (increased from home dose 5 mg) + GLP-1 agonist (if covered). See below. If not covered, consider Januvia 100 mg daily  -Please confirm patient's insurance status  -Please send Trulicity 0.75 mg subq/weekly or Ozempic 0.25 mg subq/weekly to pharmacy to see if covered. If covered, can send either to pharmacy on d/c. If sending Trulicity, please write script for 0.75 mg subq/weekly x 4 weeks and a separate script for Trulicity 1.5 mg subq/weekly to be initiated after 4 weeks of using 0.75 mg. If sending Ozempic, please send 0.25 mg subq/weekly x 4 weeks, and increase to 0.5 mg subq/weekly after 4 weeks  -Can f/u [facility] [place]: [address] [place] [address]; Ph # [phone]    #HTN  BP acceptable  Management per primary team    #HLD  Can check lipid profile  Continue statin    Louisa Carballo MD  Endocrine Fellow  For new consults, follow up questions or discharge recommendations, please call the answering service at [phone] or [phone]   66M with PMH of DM, HTN, HLD, HFrEF 30%, LV thrombus since 2017, Afib on warfarin presenting with SOB x3 days, admitted with COVID MARY.     Consulted for: Uncontrolled T2DM    #Uncontrolled T2DM c/b CAD with HFrEF  A1c 10.9%. Goal < 8%  Inpatient FS goal 100-180. FS above goal   Home regimen: Metformin 1g BID, Farxiga 5 mg daily, Glyburide 5 mg BID  Patient states he has a poor appetite   Recs:   -Lantus _ units every day at 2pm  -Admelog _ units qAC TID. HOLD if NPO or not eating  -Start low dose correctional scale qAC and qHS. No need to hold if NPO or not eating  -Consistent carb diet  -FS qAC and qHS  For d/c:   -Metformin 1g BID + Farxiga 10 mg (increased from home dose 5 mg) + GLP-1 agonist (if covered). See below. If not covered, consider Januvia 100 mg daily  -Patient has [employer] insurance and Trulicity and Ozempic should both be covered with a prior auth. Please send Trulicity 0.75 mg subq/weekly or Ozempic 0.25 mg subq/weekly to pharmacy so this process can be initiated  -Can f/u [facility] [place]: [address] [place] [address]; Ph # [phone]    #HTN  BP acceptable  Management per primary team    #HLD  Can check lipid profile  Continue statin    Louisa Carballo MD  Endocrine Fellow  For new consults, follow up questions or discharge recommendations, please call the answering service at [phone] or [phone]   66M with PMH of DM, HTN, HLD, HFrEF 30%, LV thrombus since 2017, Afib on warfarin presenting with SOB x3 days, admitted with COVID PNA.     Consulted for: Uncontrolled T2DM    #Uncontrolled T2DM c/b CAD with HFrEF  A1c 10.9%. Goal < 8%  Inpatient FS goal 100-180. FS above goal   Home regimen: Metformin 1g BID, Farxiga 5 mg daily, Glyburide 5 mg BID  Patient states he has a poor appetite   Recs:   -Lantus _ units every day at 2pm  -Admelog _ units qAC TID. HOLD if NPO or not eating  -Start low dose correctional scale qAC and qHS. No need to hold if NPO or not eating  -Consistent carb diet  -FS qAC and qHS  For d/c:   -Patient's insurance  -Can f/u [facility] [place]: [address] [place] [address]; Ph # [phone]    #HTN  BP acceptable  Management per primary team    #HLD  Can check lipid profile  Continue statin    Louisa Carballo MD  Endocrine Fellow  For new consults, follow up questions or discharge recommendations, please call the answering service at [phone] or [phone]   66M with PMH of DM, HTN, HLD, HFrEF 30%, LV thrombus since 2017, Afib on warfarin presenting with SOB x3 days, admitted with COVID PNA.     Consulted for: Uncontrolled T2DM    #Uncontrolled T2DM c/b CAD with HFrEF  A1c 10.9%. Goal < 8%  Inpatient FS goal 100-180. FS above goal   Home regimen: Metformin 1g BID, Farxiga 5 mg daily, Glyburide 5 mg BID  Patient states he has a poor appetite   Recs:   -Lantus _ units every day at 2pm  -Admelog _ units qAC TID. HOLD if NPO or not eating  -Start low dose correctional scale qAC and qHS. No need to hold if NPO or not eating  -Consistent carb diet  -FS qAC and qHS  For d/c:   -Patient's insurance has  so he cannot get GLP1 agonists or SGLT-2 inhibitors at this time. Per daughter, insurance should be reinstated soon. In the meanwhile, he will need premixed insulin 70/30  -Can f/u [facility] [place]: [address] [place] [address]; Ph # [phone]    #HTN  BP acceptable  Management per primary team    #HLD  Can check lipid profile  Continue statin    Louisa Carballo MD  Endocrine Fellow  For new consults, follow up questions or discharge recommendations, please call the answering service at [phone] or [phone]   66M with PMH of DM, HTN, HLD, HFrEF 30%, LV thrombus since 2017, Afib on warfarin presenting with SOB x3 days, admitted with COVID PNA.     Consulted for: Uncontrolled T2DM    #Uncontrolled T2DM c/b CAD with HFrEF  A1c 10.9%. Goal < 8%  Inpatient FS goal 100-180. FS above goal   Home regimen: Metformin 1g BID, Farxiga 5 mg daily, Glyburide 5 mg BID  Patient states he has a poor appetite   Recs:   -Increase Lantus 22 units every day at 2pm  -Increase Admelog to 10 units qAC TID. HOLD if NPO or not eating  -Start low dose correctional scale qAC and qHS. No need to hold if NPO or not eating  -Consistent carb diet  -FS qAC and qHS  For d/c:   -Patient's insurance has  so he cannot get GLP1 agonists or SGLT-2 inhibitors at this time. Per daughter, insurance should be reinstated soon. In the meanwhile, he will need premixed insulin 70/30. Please d/c with Humulin 70/30 24 units to be taken before breakfast and 12 units before dinner.   -Can f/u [facility] [place]: [address] [place] [address]; Ph # [phone]    #HTN  BP acceptable  Management per primary team    #HLD  Can check lipid profile  Continue statin    Louisa Carballo MD  Endocrine Fellow  For new consults, follow up questions or discharge recommendations, please call the answering service at [phone] or [phone]   66M with PMH of DM, HTN, HLD, HFrEF 30%, LV thrombus since 2017, Afib on warfarin presenting with SOB x3 days, admitted with COVMAGALI HERNANDEZ.     Consulted for: Uncontrolled T2DM    #Uncontrolled T2DM c/b CAD with HFrEF  A1c 10.9%. Goal < 8%  Inpatient FS goal 100-180. FS above goal   Home regimen: Metformin 1g BID, Farxiga 5 mg daily, Glyburide 5 mg BID  Patient states he has a poor appetite   Recs:   -Increase Lantus 22 units every day at 2pm  -Increase Admelog to 10 units qAC TID. HOLD if NPO or not eating  -Start low dose correctional scale qAC and qHS. No need to hold if NPO or not eating  -Consistent carb diet  -FS qAC and qHS  For d/c:   -Patient's insurance has  so he cannot get GLP1 agonists or SGLT-2 inhibitors at this time. Per daughter, insurance should be reinstated soon. In the meanwhile, he will need premixed insulin 70/30. Please d/c with Humulin 70/30 24 units to be taken before breakfast and 12 units before dinner. Please also d/c with Metformin 1g BID  -Can f/u [facility] [place]: [address] [place] [address]; Ph # [phone]    #HTN  BP acceptable  Management per primary team    #HLD  Can check lipid profile  Continue statin    Louisa Carballo MD  Endocrine Fellow  For new consults, follow up questions or discharge recommendations, please call the answering service at [phone] or [phone]

## 2022-01-12 NOTE — DISCHARGE NOTE PROVIDER - CARE PROVIDER_API CALL
Rasta Paredes)  Internal Medicine  [address] [place] [address]  Phone: [phone]  Fax: [phone]  Follow Up Time: 2 weeks

## 2022-01-13 ENCOUNTER — TRANSCRIPTION ENCOUNTER (OUTPATIENT)
Age: 67
End: 2022-01-13

## 2022-01-13 VITALS
OXYGEN SATURATION: 98 % | HEART RATE: 76 BPM | RESPIRATION RATE: 17 BRPM | DIASTOLIC BLOOD PRESSURE: 83 MMHG | SYSTOLIC BLOOD PRESSURE: 120 MMHG | TEMPERATURE: 98 F

## 2022-01-13 PROCEDURE — 99232 SBSQ HOSP IP/OBS MODERATE 35: CPT | Mod: GC

## 2022-01-13 RX ORDER — GABAPENTIN 400 MG/1
1 CAPSULE ORAL
Qty: 0 | Refills: 0 | DISCHARGE

## 2022-01-13 RX ORDER — INSULIN LISPRO 100/ML
10 VIAL (ML) SUBCUTANEOUS
Qty: 9 | Refills: 0
Start: 2022-01-13 | End: 2022-02-11

## 2022-01-13 RX ORDER — DAPAGLIFLOZIN 10 MG/1
1 TABLET, FILM COATED ORAL
Qty: 0 | Refills: 0 | DISCHARGE
Start: 2022-01-13

## 2022-01-13 RX ORDER — INSULIN GLARGINE 100 [IU]/ML
22 INJECTION, SOLUTION SUBCUTANEOUS
Qty: 7 | Refills: 0
Start: 2022-01-13 | End: 2022-02-11

## 2022-01-13 RX ORDER — ISOPROPYL ALCOHOL, BENZOCAINE .7; .06 ML/ML; ML/ML
1 SWAB TOPICAL
Qty: 100 | Refills: 1
Start: 2022-01-13 | End: 2022-03-03

## 2022-01-13 RX ORDER — ENOXAPARIN SODIUM 100 MG/ML
22 INJECTION SUBCUTANEOUS
Qty: 7 | Refills: 0
Start: 2022-01-13 | End: 2022-02-11

## 2022-01-13 RX ORDER — METFORMIN HYDROCHLORIDE 850 MG/1
1 TABLET ORAL
Qty: 0 | Refills: 0 | DISCHARGE

## 2022-01-13 RX ORDER — DAPAGLIFLOZIN 10 MG/1
1 TABLET, FILM COATED ORAL
Qty: 0 | Refills: 0 | DISCHARGE

## 2022-01-13 RX ADMIN — SACUBITRIL AND VALSARTAN 1 TABLET(S): 24; 26 TABLET, FILM COATED ORAL at 05:13

## 2022-01-13 RX ADMIN — CHLORHEXIDINE GLUCONATE 1 APPLICATION(S): 213 SOLUTION TOPICAL at 11:36

## 2022-01-13 RX ADMIN — Medication 40 MILLIGRAM(S): at 05:13

## 2022-01-13 RX ADMIN — Medication 2: at 12:30

## 2022-01-13 RX ADMIN — APIXABAN 5 MILLIGRAM(S): 2.5 TABLET, FILM COATED ORAL at 05:13

## 2022-01-13 RX ADMIN — ISOSORBIDE MONONITRATE 30 MILLIGRAM(S): 60 TABLET, EXTENDED RELEASE ORAL at 11:41

## 2022-01-13 RX ADMIN — INSULIN GLARGINE 22 UNIT(S): 100 INJECTION, SOLUTION SUBCUTANEOUS at 12:36

## 2022-01-13 RX ADMIN — Medication 10 UNIT(S): at 12:30

## 2022-01-13 RX ADMIN — Medication 10 UNIT(S): at 08:49

## 2022-01-13 RX ADMIN — PANTOPRAZOLE SODIUM 40 MILLIGRAM(S): 20 TABLET, DELAYED RELEASE ORAL at 05:13

## 2022-01-13 NOTE — DIETITIAN INITIAL EVALUATION ADULT. - PROBLEM SELECTOR PLAN 3
Mild LE edema somewhat worsened per patient. S/p 40 IV lasix in ED. Will closely monitor volume status and hold off on further IV diuresis. More likely COVID and COPD as contributing to SOB than CHF. Not grossly overloaded. Wheezing more so heard on exam than crackles.  -echo ordered  - resume metoprolol, entresto - confirm home dose, lasix 40 BID

## 2022-01-13 NOTE — DIETITIAN INITIAL EVALUATION ADULT. - PROBLEM SELECTOR PLAN 2
Wheezing on exam and CO2 elevation on VBG. Possibly underlying COPD history.   -standing nebs, steroids  -f/u COVID swab - positive

## 2022-01-13 NOTE — DIETITIAN INITIAL EVALUATION ADULT. - PROBLEM SELECTOR PLAN 7
Resume metoprolol  Confirm entresto dose - daughter confirms he is on but does not know dose. Resuming lowest dose patient was on on last admission

## 2022-01-13 NOTE — DIETITIAN INITIAL EVALUATION ADULT. - PROBLEM SELECTOR PLAN 4
Afib on EKG, rate controlled  -subtherapeutic INR 1.42  -on warfarin 4mg on M-Fri, 3mg Sat-sunday  -will give 4.5mg today and check daily INR  -resume metoprolol

## 2022-01-13 NOTE — CHART NOTE - NSCHARTNOTEFT_GEN_A_CORE
66M with PMH of DM, HTN, HLD, HFrEF 30%, LV thrombus since 2017, Afib on warfarin presenting with SOB x3 days, admitted with COVID PNA. Endocrine consulted for uncontrolled T2DM    Patient pending discharge today  Patient seen by transitions of care pharmacist, he is currently uninsured but patient and daughter state his insurance is inactive but he is in the process of reinstating it. Will use [employer] coupon so patient can get basal/bolus insulin.   Please use Basaglar and Humalog PENS - these are the ones covered by the coupon   Recommend for discharge today Basaglar 22 units daily at 2 PM, Humalog 10 units TID before meals (Hold if not eating)   Patient needs One Touch test strips and insulin PEN needles   STOP Metformin 1g BID, Farxiga 5 mg daily, Glyburide 5 mg BID  As outpatient consider resuming Farxiga when insurance reinstated (beneficial agent in setting of HF)  Followup with [facility] [place]: [address] [place] [address]; Ph # [phone]  DC recs given to primary team MD Robertson (HS 7)    CAPILLARY BLOOD GLUCOSE    POCT Blood Glucose.: 145 mg/dL (13 Jan 2022 08:22)  POCT Blood Glucose.: 116 mg/dL (12 Jan 2022 22:54)  POCT Blood Glucose.: 189 mg/dL (12 Jan 2022 16:49)  POCT Blood Glucose.: 220 mg/dL (12 Jan 2022 12:10)    01-12    140  |  104  |  38<H>  ----------------------------<  205<H>  3.6   |  24  |  0.97    Ca    8.7      12 Jan 2022 09:01  Phos  2.7     01-12  Mg     2.00     01-12    TPro  6.9  /  Alb  2.9<L>  /  TBili  0.5  /  DBili  x   /  AST  98<H>  /  ALT  115<H>  /  AlkPhos  95  01-12    MEDICATIONS  (STANDING):  ALBUTerol    90 MICROgram(s) HFA Inhaler 1 Puff(s) Inhalation two times a day  apixaban 5 milliGRAM(s) Oral every 12 hours  chlorhexidine 2% Cloths 1 Application(s) Topical daily  dextrose 40% Gel 15 Gram(s) Oral once  dextrose 5%. 1000 milliLiter(s) (50 mL/Hr) IV Continuous <Continuous>  dextrose 5%. 1000 milliLiter(s) (100 mL/Hr) IV Continuous <Continuous>  dextrose 50% Injectable 25 Gram(s) IV Push once  dextrose 50% Injectable 12.5 Gram(s) IV Push once  dextrose 50% Injectable 25 Gram(s) IV Push once  furosemide    Tablet 40 milliGRAM(s) Oral two times a day  glucagon  Injectable 1 milliGRAM(s) IntraMuscular once  influenza  Vaccine (HIGH DOSE) 0.7 milliLiter(s) IntraMuscular once  insulin glargine Injectable (LANTUS) 22 Unit(s) SubCutaneous <User Schedule>  insulin lispro (ADMELOG) corrective regimen sliding scale   SubCutaneous three times a day before meals  insulin lispro (ADMELOG) corrective regimen sliding scale   SubCutaneous at bedtime  insulin lispro Injectable (ADMELOG) 10 Unit(s) SubCutaneous three times a day before meals  isosorbide   mononitrate ER Tablet (IMDUR) 30 milliGRAM(s) Oral daily  metoprolol succinate ER 25 milliGRAM(s) Oral daily  pantoprazole    Tablet 40 milliGRAM(s) Oral before breakfast  sacubitril 49 mG/valsartan 51 mG 1 Tablet(s) Oral two times a day  tiotropium 18 MICROgram(s) Capsule 1 Capsule(s) Inhalation daily    A1C with Estimated Average Glucose Result: 10.9 % (01-10-22 @ 19:59)  A1C with Estimated Average Glucose Result: 8.8 % (07-09-21 @ 09:43)    Diet, DASH/TLC:   Sodium & Cholesterol Restricted  Consistent Carbohydrate {Evening Snack} (CSTCHOSN)  1000mL Fluid Restriction ([id number]) (01-11-22 @ 00:08)    Debbie Polk  Nurse Practitioner  Division of Endocrinology & Diabetes  In house pager #[phone]/long range pager #[phone]    If before 9AM or after 6PM, or on weekends/holidays, please call endocrine answering service for assistance ([phone]).  For nonurgent matters email [email or web address] for assistance. 66M with PMH of DM, HTN, HLD, HFrEF 30%, LV thrombus since 2017, Afib on warfarin presenting with SOB x3 days, admitted with COVID PNA. Endocrine consulted for uncontrolled T2DM    Patient pending discharge today  Patient seen by transitions of care pharmacist, he is currently uninsured but patient and daughter state his insurance is inactive but he is in the process of reinstating it. Will use [employer] coupon so patient can get basal/bolus insulin.   Please use Basaglar and Humalog PENS - these are the ones covered by the coupon   Recommend for discharge today Basaglar 22 units daily at 2 PM, Humalog 10 units TID before meals (Hold if not eating)   Patient needs One Touch test strips and insulin PEN needles   STOP Metformin 1g BID, Farxiga 5 mg daily, Glyburide 5 mg BID  As outpatient consider resuming Farxiga when insurance reinstated (beneficial agent in setting of HF)  Followup with [facility] [place]: [address] [place] [address];  # [phone]   OR if insurance obtained - [address] [place] [address], [address], [facility] [address], [phone]  DC recs given to primary team MD Robertson (HS 7)    CAPILLARY BLOOD GLUCOSE    POCT Blood Glucose.: 145 mg/dL (13 Jan 2022 08:22)  POCT Blood Glucose.: 116 mg/dL (12 Jan 2022 22:54)  POCT Blood Glucose.: 189 mg/dL (12 Jan 2022 16:49)  POCT Blood Glucose.: 220 mg/dL (12 Jan 2022 12:10)    01-12    140  |  104  |  38<H>  ----------------------------<  205<H>  3.6   |  24  |  0.97    Ca    8.7      12 Jan 2022 09:01  Phos  2.7     01-12  Mg     2.00     01-12    TPro  6.9  /  Alb  2.9<L>  /  TBili  0.5  /  DBili  x   /  AST  98<H>  /  ALT  115<H>  /  AlkPhos  95  01-12    MEDICATIONS  (STANDING):  ALBUTerol    90 MICROgram(s) HFA Inhaler 1 Puff(s) Inhalation two times a day  apixaban 5 milliGRAM(s) Oral every 12 hours  chlorhexidine 2% Cloths 1 Application(s) Topical daily  dextrose 40% Gel 15 Gram(s) Oral once  dextrose 5%. 1000 milliLiter(s) (50 mL/Hr) IV Continuous <Continuous>  dextrose 5%. 1000 milliLiter(s) (100 mL/Hr) IV Continuous <Continuous>  dextrose 50% Injectable 25 Gram(s) IV Push once  dextrose 50% Injectable 12.5 Gram(s) IV Push once  dextrose 50% Injectable 25 Gram(s) IV Push once  furosemide    Tablet 40 milliGRAM(s) Oral two times a day  glucagon  Injectable 1 milliGRAM(s) IntraMuscular once  influenza  Vaccine (HIGH DOSE) 0.7 milliLiter(s) IntraMuscular once  insulin glargine Injectable (LANTUS) 22 Unit(s) SubCutaneous <User Schedule>  insulin lispro (ADMELOG) corrective regimen sliding scale   SubCutaneous three times a day before meals  insulin lispro (ADMELOG) corrective regimen sliding scale   SubCutaneous at bedtime  insulin lispro Injectable (ADMELOG) 10 Unit(s) SubCutaneous three times a day before meals  isosorbide   mononitrate ER Tablet (IMDUR) 30 milliGRAM(s) Oral daily  metoprolol succinate ER 25 milliGRAM(s) Oral daily  pantoprazole    Tablet 40 milliGRAM(s) Oral before breakfast  sacubitril 49 mG/valsartan 51 mG 1 Tablet(s) Oral two times a day  tiotropium 18 MICROgram(s) Capsule 1 Capsule(s) Inhalation daily    A1C with Estimated Average Glucose Result: 10.9 % (01-10-22 @ 19:59)  A1C with Estimated Average Glucose Result: 8.8 % (07-09-21 @ 09:43)    Diet, DASH/TLC:   Sodium & Cholesterol Restricted  Consistent Carbohydrate {Evening Snack} (CSTCHOSN)  1000mL Fluid Restriction ([id number]) (01-11-22 @ 00:08)    Debbie Polk  Nurse Practitioner  Division of Endocrinology & Diabetes  In house pager #[phone]/long range pager #[phone]    If before 9AM or after 6PM, or on weekends/holidays, please call endocrine answering service for assistance ([phone]).  For nonurgent matters email [email or web address] for assistance.

## 2022-01-13 NOTE — PROGRESS NOTE ADULT - PROBLEM SELECTOR PLAN 2
- 1/10 CXR with R sided peripheral opacity consistent with COVID pneumonia  - Pt vaccinated but not boosted, COVID positive 1/10  - O2 saturations overnight 99% on room air, not a candidate for remdesivir or decadron  - Continue to monitor, symptoms likely from infection rather than CHF exacerbation  - Patient with insignificant smoking history and no diagnosis of COPD, s/p 40mg prednisone dose x1 for possible COPD component but was discontinued  - Vitals stable for discharge - 1/10 CXR with R sided peripheral opacity consistent with COVID pneumonia  - Pt vaccinated but not boosted, COVID positive 1/10  - Patient with insignificant smoking history and no diagnosis of COPD, s/p 40mg prednisone dose x1 for possible COPD component but was discontinued  - O2 saturations remain stable, overnight 99% on room air, not a candidate for remdesivir or decadron  - Lungs osund clearer today, no wheezing or rales  - Stable for discharge - 1/10 CXR with R sided peripheral opacity consistent with COVID pneumonia  - Pt vaccinated but not boosted, COVID positive 1/10  - Patient with insignificant smoking history and no diagnosis of COPD, s/p 40mg prednisone dose x1 for possible COPD component but was discontinued  - O2 saturations remain stable, overnight 99% on room air, not a candidate for remdesivir or decadron  - Lungs sound clearer today, no wheezing or rales  - Stable for discharge

## 2022-01-13 NOTE — DIETITIAN INITIAL EVALUATION ADULT. - OTHER INFO
67 y/o M with DM2, afib, p/w SOB. COVID+. Deny speaking.    Per RN, pt is completing >75% of his meals. Denies any GI issues (nausea/vomiting/diarrhea/constipation.) Denies any chewing or swallowing difficulties at this time. NKFA.    Spoke with patient's daughter on the phone regarding diabetes nutrition education. Discussed carbohydrate sources, carbohydrate portions, protein sources, mixed meals, and nutrition label reading. Stressed importance of balanced meals with adequate protein and fiber. Daughter was informed of current A1c, goal A1c, and goal fingerstick range.     July 2021 wt: 218 pounds (99 kg).

## 2022-01-13 NOTE — DISCHARGE NOTE NURSING/CASE MANAGEMENT/SOCIAL WORK - NSSCNAMETXT_GEN_ALL_CORE
[facility] [phone]  Nurse to visit on the day after disharge.  Other appropriate services to be arranged thereafter.  Please contact Home Care agency  at the above phone# if you have not heard from them by approximately 12 noon on the day after your hospital discharge.

## 2022-01-13 NOTE — DISCHARGE NOTE NURSING/CASE MANAGEMENT/SOCIAL WORK - NSDCFUADDAPPT_GEN_ALL_CORE_FT
Please follow up with Dr. Paredes, your primary care physician.     Also, please follow up your endocrinologist. Please call [phone] to set up an appointment with them at the [facility] at [facility]-[address] [place] [address].

## 2022-01-13 NOTE — DIETITIAN INITIAL EVALUATION ADULT. - PROBLEM SELECTOR PLAN 1
Possible COVID PNA on CXR. Pt vaccinated but not boosted. Not hypoxic  Likely contributing to SOB and possible COPD exacerbation  -given not hypoxic hold off on remdesivir or decadron  -inflammatory markers elevated, check D-dimer  -dose titrate warfarin for AC

## 2022-01-13 NOTE — DIETITIAN INITIAL EVALUATION ADULT. - PROBLEM SELECTOR PLAN 6
On metformin 1g BID, glipizide 10mg BID, farxiga 5mg qd - hold inpatietn  low dose ISS for now given hypoglycemia to 50s. Now   FSG may elevated while on steroids and may require some lantus on admission. Will closely monitor FSGs and adjust as appropriate  Add on A1C

## 2022-01-13 NOTE — DIETITIAN INITIAL EVALUATION ADULT. - WEIGHT (LBS)
220.4 Bilobed Flap Text: The defect edges were debeveled with a #15 scalpel blade.  Given the location of the defect and the proximity to free margins a bilobe flap was deemed most appropriate.  Using a sterile surgical marker, an appropriate bilobe flap drawn around the defect.    The area thus outlined was incised deep to adipose tissue with a #15 scalpel blade.  The skin margins were undermined to an appropriate distance in all directions utilizing iris scissors. 148

## 2022-01-13 NOTE — DIETITIAN INITIAL EVALUATION ADULT. - PROBLEM SELECTOR PLAN 5
Hx LV thrombus on echo 7.2021. On warfarin. Per daughter this has been known since 2017 and follows with cardiology Dr. Gonzalez  -echo ordered  -warfarin adjustment as above

## 2022-01-13 NOTE — PROGRESS NOTE ADULT - PROBLEM SELECTOR PLAN 8
DVT Prophylaxis: Eliquis for a-fib  Diet: DASH  Lines/Tubes: peripheral IVs only DVT Prophylaxis: Eliquis for a-fib  Diet: DASH  Lines/Tubes: peripheral IVs only  Dispo: Home today, daughter will be home to take care of him in mornings and evenings, both were educated on how to use insulin and home nurse will provide education as well

## 2022-01-13 NOTE — DISCHARGE NOTE NURSING/CASE MANAGEMENT/SOCIAL WORK - PATIENT PORTAL LINK FT
You can access the FollowMyHealth Patient Portal offered by [facility] by registering at the following website: [email or web address]. By joining [facility]’s FollowMyHealth portal, you will also be able to view your health information using other applications (apps) compatible with our system.

## 2022-01-13 NOTE — PROGRESS NOTE ADULT - PROBLEM SELECTOR PROBLEM 1
Type 2 diabetes mellitus with hyperglycemia, without long-term current use of insulin
Type 2 diabetes mellitus with hyperglycemia, without long-term current use of insulin
2019 novel coronavirus disease (COVID-19)
2019 novel coronavirus disease (COVID-19)

## 2022-01-13 NOTE — PROGRESS NOTE ADULT - PROBLEM SELECTOR PLAN 1
- Home regimen: Metformin 1g BID, glipizide 10mg BID, farxiga 5mg qd - hold inpatient  - Hypoglycemia to 50s on admission but 322 this morning, prednisone may be contributing to hyperglycemia  - A1C 10.9 on arrival, diabetes uncontrolled consulted Endo and started Insulin regimen  - Per Endo recs will increase to Lantus 22u pre-meal 10u + LDSS  - Endo consulted and would like to stop glipizide, and start a GLP-1 agonist but right now patient is uninsured and is between insurance policies so would not be able to afford these medications or his Farxiga right now  - Will DC with metformin 1gm BID and Humulin 70/30 24u before breakfast and 12u before dinner  - Pharmacy did insulin education with patient and daughter yesterday  - Will arrange Endo f/u, otherwise he is stable for DC today - Home regimen: Metformin 1g BID, glipizide 10mg BID, farxiga 5mg qd - hold inpatient  - Hypoglycemia to 50s on admission but 322 this morning, prednisone may be contributing to hyperglycemia  - A1C 10.9 on arrival, diabetes uncontrolled consulted Endo and started Insulin regimen  - Per Endo recs will increase to Lantus 22u pre-meal 10u + LDSS  - Endo consulted and would like to stop glipizide, and start a GLP-1 agonist but right now patient is uninsured and so would not be able to afford this medications or his Farxiga right now  - Will DC with metformin 1gm BID and Humulin 70/30 24u before breakfast and 12u before dinner  - Pharmacy did insulin education with patient and daughter yesterday, will have additional education provided today to ensure 70/30 education regimen not basal bolus  - Encouraged patient and daughter to keep blood glucose log once discharged to bring to endocrinology appointment  - Will arrange Endo f/u, otherwise he is stable for DC today - Home regimen: Metformin 1g BID, glipizide 10mg BID, farxiga 5mg qd - hold inpatient  - Hypoglycemia to 50s on admission but 322 this morning, prednisone may be contributing to hyperglycemia  - A1C 10.9 on arrival, diabetes uncontrolled consulted Endo and started Insulin regimen  - Per Endo recs will increase to Lantus 22u pre-meal 10u + LDSS  - Endo consulted and would like to stop glipizide, and stop metformin due to patient's heart failure, and start a GLP-1 agonist but right now patient is uninsured and so would not be able to afford this medications or his Farxiga refill right now  - Official DC recs from Endo are basal bolus on current regimen, 22u long acting and 10u premeal tID, they have coupon that works for this whole year to give to patient  - Pharmacy did insulin education with patient and daughter yesterday  - Encouraged patient and daughter to keep blood glucose log once discharged to bring to endocrinology appointment  - Will arrange Endo f/u, otherwise he is stable for DC today

## 2022-01-13 NOTE — PROGRESS NOTE ADULT - ATTENDING COMMENTS
Pt seen and examined, chart and labs reviewed.  Briefly a 66M DM2 (current A1c 10.9 up from 8.8), HTN, HLD, CHFrEF 30%, LV thrombus and Afib on Coumadin, who presents with increasing SOB x 3 days, found to be COVID+.  On exam today, pt reports feeling slightly better, reports improved myalgias and joint pains, continues to saturate well on RA.  Denies CP, hasn't ambulated yet so unable to endorse BLACK.     #SOB 2/2 COVID, not meeting sepsis criteria:  - No indication for Remdesivir/steroids at this time   - Will continue to monitor and provide supportive care  - Per pt, received 2 doses Pfizer, no booster. Last dose March 2021.     #Chronic systolic HF, last EF 30% July 2021:  - Pt does not appear volume overloaded, lungs clear, mild pitting edema  - Resume PO lasix 40mg BID, Metoprolol and Entresto  - TTE may be done as outpatient     #Hypercoagulable state, known persistent AFib and LV thrombus:  - Resume A/C with Coumadin, goal INR 2-3  - Currenlty rate controlled, resume Metoprolol    #Uncontrolled DM2 (A1c 10.9)  - Pt on oral agents at home and would be candidate for insulin  - Dietician consult  - Endo to follow, will initiate weight based insulin dosing today     #Dispo:  - Anticipate dc home in next 48 hrs   Care discussed with daughter via phone
65 yo M with DM2, covid. Adjust basal-bolus insulin as detailed. Plan to d/c on mixed insulin +metformin due to insurance issues
Pt seen and examined, chart and labs reviewed.  Briefly a 66M DM2 (current A1c 10.9 up from 8.8), HTN, HLD, CHFrEF 30% , LV thrombus and Afib on Coumadin, who presents with increasing SOB x 3 days, found to be COVID+.  On exam today, continues to feel better, is in agreement with insulin use.  Denies CP, has been ambulating within him room and feels well.     #SOB 2/2 COVID, not meeting sepsis criteria:  - No indication for Remdesivir/steroids at this time   - Will continue to monitor and provide supportive care  - Per pt, received 2 doses Pfizer, no booster. Last dose March 2021.      #Chronic systolic HF, last EF 30% July 2021, repeat TTE confirming severe LV dysfunction:  - Pt does not appear volume overloaded, lungs clear, mild pitting edema  - Resume PO lasix 40mg BID, Metoprolol and Entresto  - Care discussed with his cardiologist Dr. Gonzalez, confirms that pt is on Eliquis for A/C now   - Pt refused AICD in past    #Hypercoagulable state, known persistent AFib and LV thrombus:  - Resume A/C with Eliquis  - Currenlty rate controlled, resume Metoprolol    #Uncontrolled DM2 (A1c 10.9)  - Pt on oral agents at home and is candidate for insulin  - Dietician consulted  - Derek herrera appreciated    #Dispo:  - Anticipate dc home this afternoon or tomorrow AM once medication coverage confirmed   Care discussed with daughter via phone
Pt seen and examined, chart and labs reviewed.  Briefly a 66M DM2 (current A1c 10.9 up from 8.8), HTN, HLD, CHFrEF 30% , LV thrombus and Afib on Coumadin, who presents with increasing SOB x 3 days, found to be COVID+.  Pt continues to feel well, no new acute complaints, received diabetes education yesterday and is comfortable with insulin injections.     #SOB 2/2 COVID, not meeting sepsis criteria:  - No indication for Remdesivir/steroids   - Per pt, received 2 doses Pfizer, no booster. Last dose March 2021.      #Chronic systolic HF, last EF 30% July 2021, repeat TTE confirming severe LV dysfunction:  - Pt does not appear volume overloaded, lungs clear, mild pitting edema  - Resume PO lasix 40mg BID, Metoprolol and Entresto  - Care discussed with his cardiologist Dr. Gonzalez, confirms that pt is on Eliquis for A/C now   - Pt refused AICD     #Hypercoagulable state, known persistent AFib and LV thrombus:  - Resume A/C with Eliquis  - Currenlty rate controlled, resume Metoprolol    #Uncontrolled DM2 (A1c 10.9)  - Pt on oral agents at home and is candidate for insulin  - Dietician consulted  - Endo recs appreciated    #Dispo:  - DC Home today, dc time: 32 minutes

## 2022-01-13 NOTE — DISCHARGE NOTE NURSING/CASE MANAGEMENT/SOCIAL WORK - NSDCVIVACCINE_GEN_ALL_CORE_FT
influenza, injectable, quadrivalent, preservative free; 01-Oct-2017 13:43; Kristie Haider (RN); [employer]; [id number]; IntraMuscular; Deltoid Left.; 0.5 milliLiter(s); VIS (VIS Published: 07-Aug-2015, VIS Presented: 01-Oct-2017);

## 2022-01-13 NOTE — DISCHARGE NOTE NURSING/CASE MANAGEMENT/SOCIAL WORK - NSDCPEFALRISK_GEN_ALL_CORE
For information on Fall & Injury Prevention, visit: [email or web address] OR  [email or web address] OR  https://www.cdc.gov/steadi/patient.html

## 2022-01-13 NOTE — PROGRESS NOTE ADULT - PROBLEM SELECTOR PLAN 6
- BPs 10s/70s overnight, well controlled  - Continue home metoprolol succ 50mg qd and Entresto 49/51 BID - BPs 100s/70s overnight, well controlled  - Continue home metoprolol succ 50mg qd and Entresto 49/51 BID

## 2022-01-13 NOTE — PROGRESS NOTE ADULT - SUBJECTIVE AND OBJECTIVE BOX
Samantha Robertson MD  EM/IM PGY-1  Pager # [phone]  --------------------------------  ==============UNFINISHED NOTE==================   Samantha Robertson MD  EM/IM PGY-1  Pager # [phone]  --------------------------------  OVERNIGHT: Vitals normal, all scheduled medications given, no PRNs required. No acute events.      SUBJECTIVE: Still feels some malaise and generalized weakness, but is able to stand and walk around room without difficulty, has no pain anywhere, denies nausea/vomiting/bloody urine or stool, eating meals.      PHYSICAL EXAM:  GENERAL: Sitting comfortably in bed in no acute distress  NEURO: Alert and Oriented to person, place, date and situation. Pupils symmetric, No ptosis. No facial asymmetry or dysarthria, no tremor noted.  HEENT: No conjunctival injection or scleral icterus.   CARD: Regular rate and rhythm, no murmurs or gallops appreciated.  RESP: Clear to auscultation bilaterally, No wheezes, rales or rhonchi. Good respiratory effort.  ABD: Bowel sounds active. Obese distended abdomen, Soft and nontender to palpation in all quadrants, no guarding, no rigidity. No masses appreciated.  EXT: No pedal edema. 2+DP pulses bilaterally.  SKIN: No rashes, bruising or acute skin injuries on face, limbs, chest or back    Vital Signs Last 24 Hrs  T(C): 36.4 (13 Jan 2022 05:00), Max: 36.7 (12 Jan 2022 21:00)  T(F): 97.6 (13 Jan 2022 05:00), Max: 98.1 (12 Jan 2022 21:00)  HR: 67 (13 Jan 2022 05:00) (66 - 77)  BP: 103/72 (13 Jan 2022 05:00) (100/60 - 116/78)  BP(mean): --  RR: 18 (13 Jan 2022 05:00) (18 - 18)  SpO2: 99% (13 Jan 2022 05:00) (93% - 99%)    .  LABS: None today                        14.2   7.12  )-----------( 286      ( 12 Jan 2022 09:01 )             45.5     01-12    140  |  104  |  38<H>  ----------------------------<  205<H>  3.6   |  24  |  0.97    Ca    8.7      12 Jan 2022 09:01  Phos  2.7     01-12  Mg     2.00     01-12    TPro  6.9  /  Alb  2.9<L>  /  TBili  0.5  /  DBili  x   /  AST  98<H>  /  ALT  115<H>  /  AlkPhos  95  01-12

## 2022-01-13 NOTE — PROGRESS NOTE ADULT - PROBLEM SELECTOR PLAN 4
Afib on EKG on admission, rate controlled since admission  - Originally thought to be on warfarin but Cardiologist confirmed pt on Eliquis at home, pt got total of 1 dose warfarin 4.5mg during his stay, for now will continue Eliquis 5mg BID  - Continue home metoprolol succ 25mg qd

## 2022-01-13 NOTE — DIETITIAN INITIAL EVALUATION ADULT. - PROBLEM SELECTOR PROBLEM 3
Detail Level: Detailed Was A Bandage Applied: Yes Punch Size In Mm: 5 Biopsy Type: H and E Anesthesia Type: 1% lidocaine with epinephrine Anesthesia Volume In Cc (Will Not Render If 0): 0.5 Additional Anesthesia Volume In Cc (Will Not Render If 0): 0 Hemostasis: Ligature Epidermal Sutures: 4-0 Nylon Wound Care: Petrolatum Dressing: bandage Suture Removal: 14 days Patient Will Remove Sutures At Home?: No Lab: [id number] Lab Facility: [id number] Consent: Written consent was obtained and risks were reviewed including but not limited to scarring, infection, bleeding, scabbing, incomplete removal, nerve damage and allergy to anesthesia. Post-Care Instructions: I reviewed with the patient in detail post-care instructions. Patient is to keep the biopsy site dry overnight, and then apply bacitracin twice daily until healed. Patient may apply hydrogen peroxide soaks to remove any crusting. Home Suture Removal Text: Patient was provided a home suture removal kit and will remove their sutures at home.  If they have any questions or difficulties they will call the office. Notification Instructions: Patient will be notified of biopsy results. However, patient instructed to call the office if not contacted within 2 weeks. Billing Type: Third-Party Bill Information: Selecting Yes will display possible errors in your note based on the variables you have selected. This validation is only offered as a suggestion for you. PLEASE NOTE THAT THE VALIDATION TEXT WILL BE REMOVED WHEN YOU FINALIZE YOUR NOTE. IF YOU WANT TO FAX A PRELIMINARY NOTE YOU WILL NEED TO TOGGLE THIS TO 'NO' IF YOU DO NOT WANT IT IN YOUR FAXED NOTE. Chronic CHF

## 2022-01-13 NOTE — PROGRESS NOTE ADULT - PROBLEM SELECTOR PLAN 3
- 7/2021 TTE: 30%EF, moderate aortic stenosis, Mild concentric LVH, Severe segmental LV systolic  dysfunction  - 1/11/2022 TTE unchanged from prior, did not have EF% estimate  - S/p 40 IV lasix in ED  - Patient did not feel significantly better after initial lasix dosing, for now only continue maintenance home dose of PO 40mg BID  - More likely COVID as contributor to symptoms, Not grossly volume overloaded and radiology comments CXR more consistent with COVID instead of pulmonary edema  - Continue home metoprolol 25mg qd, Entresto 49mg-51mg BID, Lasix 40 BID

## 2022-01-13 NOTE — DIETITIAN INITIAL EVALUATION ADULT. - PERTINENT MEDS FT
MEDICATIONS  (STANDING):  ALBUTerol    90 MICROgram(s) HFA Inhaler 1 Puff(s) Inhalation two times a day  apixaban 5 milliGRAM(s) Oral every 12 hours  chlorhexidine 2% Cloths 1 Application(s) Topical daily  dextrose 40% Gel 15 Gram(s) Oral once  dextrose 5%. 1000 milliLiter(s) (50 mL/Hr) IV Continuous <Continuous>  dextrose 5%. 1000 milliLiter(s) (100 mL/Hr) IV Continuous <Continuous>  dextrose 50% Injectable 25 Gram(s) IV Push once  dextrose 50% Injectable 12.5 Gram(s) IV Push once  dextrose 50% Injectable 25 Gram(s) IV Push once  furosemide    Tablet 40 milliGRAM(s) Oral two times a day  glucagon  Injectable 1 milliGRAM(s) IntraMuscular once  influenza  Vaccine (HIGH DOSE) 0.7 milliLiter(s) IntraMuscular once  insulin glargine Injectable (LANTUS) 22 Unit(s) SubCutaneous <User Schedule>  insulin lispro (ADMELOG) corrective regimen sliding scale   SubCutaneous three times a day before meals  insulin lispro (ADMELOG) corrective regimen sliding scale   SubCutaneous at bedtime  insulin lispro Injectable (ADMELOG) 10 Unit(s) SubCutaneous three times a day before meals  isosorbide   mononitrate ER Tablet (IMDUR) 30 milliGRAM(s) Oral daily  metoprolol succinate ER 25 milliGRAM(s) Oral daily  pantoprazole    Tablet 40 milliGRAM(s) Oral before breakfast  sacubitril 49 mG/valsartan 51 mG 1 Tablet(s) Oral two times a day  tiotropium 18 MICROgram(s) Capsule 1 Capsule(s) Inhalation daily

## 2022-01-13 NOTE — PROGRESS NOTE ADULT - PROBLEM SELECTOR PLAN 5
Hx LV thrombus on echo 7/2021. On warfarin. Per daughter this has been known since 2017 and follows with cardiology Dr. Gonzalez  - 1/12 TTE unchanged from prior, no EF% given but severe segmental LV dysfunction with thrombus  - Pt is not actually on warfarin, there has been a lot of difference in the med list on pt's phone, the meds the daughter has confirmed, what the pharmacy has for the patient and what he is truly on. Today it was confirmed with pt cardiologist that he is on Eliquis not warfarin  - During time here he only received one 4.5mg dose of warfarin  - Restart Eliquis 5mg BID Hx LV thrombus on echo 7/2021. On warfarin. Per daughter this has been known since 2017 and follows with cardiology Dr. Gonzalez  - 1/12 TTE unchanged from prior, no EF% given but severe segmental LV dysfunction with thrombus  - Pt is not actually on warfarin, there has been a lot of difference in the med list on pt's phone, the meds the daughter has confirmed, what the pharmacy has for the patient and what he is truly on. Today it was confirmed with pt cardiologist that he is on Eliquis not warfarin  - During time here he only received one 4.5mg dose of warfarin  - Continue Eliquis 5mg BID

## 2022-01-20 PROBLEM — I51.3 INTRACARDIAC THROMBOSIS, NOT ELSEWHERE CLASSIFIED: Chronic | Status: ACTIVE | Noted: 2022-01-11

## 2022-05-17 ENCOUNTER — APPOINTMENT (OUTPATIENT)
Dept: ENDOCRINOLOGY | Facility: CLINIC | Age: 67
End: 2022-05-17

## 2023-05-23 NOTE — ED ADULT TRIAGE NOTE - HEIGHT IN FEET
5 Purse String (Simple) Text: Given the location of the defect and the characteristics of the surrounding skin a purse string closure was deemed most appropriate.  Undermining was performed circumfirentially around the surgical defect.  A purse string suture was then placed and tightened.

## 2024-11-12 NOTE — PROGRESS NOTE ADULT - PROBLEM SELECTOR PLAN 2
- unknown hx COPD, has extensive smoking history  - plan as above  - Patient walked without nasal canula and his O2 sat was 96%.  - outpt CT chest in 3 month to follow up nodule
- unknown hx COPD, has extensive smoking history  - plan as above  - outpt CT chest in 3 month to follow up nodule
- unknown hx COPD, has extensive smoking history  - plan as above
- unknown hx COPD, has extensive smoking history  - plan as above
- unknown hx COPD, has extensive smoking history  - plan as above  - outpt CT chest in 3 month to follow up nodule
 section
- unknown hx COPD, has extensive smoking history  - plan as above  - outpt CT chest in 3 month to follow up nodule

## 2024-12-31 NOTE — ED PROVIDER NOTE - WR ORDER DATE AND TIME 1
[FreeTextEntry1] : Documented by Flory Olsen acting as a scribe for Dr. Mann on 12/27/2024. All medical record entries made by the Scribe were at Dr. Mann's direction and personally dictated by me on 12/27/2024. I have reviewed the chart and agree that the record accurately reflects my personal performances of the history, physical exam, assessment, and plan. I have also personally directed, reviewed, and agree with the plan.
[FreeTextEntry1] : Documented by Flory Olsen acting as a scribe for Dr. Mann on 12/27/2024. All medical record entries made by the Scribe were at Dr. Mann's direction and personally dictated by me on 12/27/2024. I have reviewed the chart and agree that the record accurately reflects my personal performances of the history, physical exam, assessment, and plan. I have also personally directed, reviewed, and agree with the plan.
08-Jul-2021 18:26

## 2025-06-03 NOTE — ED PROCEDURE NOTE - PROCEDURE DATE TIME, MLM
10-Charles-2022 20:44 - Patient home ferrous sulfate at home   - s/p 1 unit pRBC  - Care per MICU Team